# Patient Record
Sex: FEMALE | Race: WHITE | NOT HISPANIC OR LATINO | Employment: FULL TIME | URBAN - METROPOLITAN AREA
[De-identification: names, ages, dates, MRNs, and addresses within clinical notes are randomized per-mention and may not be internally consistent; named-entity substitution may affect disease eponyms.]

---

## 2018-04-09 ENCOUNTER — OFFICE VISIT (OUTPATIENT)
Dept: FAMILY MEDICINE CLINIC | Facility: CLINIC | Age: 53
End: 2018-04-09
Payer: COMMERCIAL

## 2018-04-09 VITALS
HEART RATE: 78 BPM | SYSTOLIC BLOOD PRESSURE: 140 MMHG | BODY MASS INDEX: 32.32 KG/M2 | HEIGHT: 65 IN | TEMPERATURE: 97.7 F | WEIGHT: 194 LBS | OXYGEN SATURATION: 99 % | DIASTOLIC BLOOD PRESSURE: 90 MMHG

## 2018-04-09 DIAGNOSIS — Z00.00 ANNUAL PHYSICAL EXAM: Primary | ICD-10-CM

## 2018-04-09 DIAGNOSIS — Z12.39 SCREENING FOR BREAST CANCER: ICD-10-CM

## 2018-04-09 DIAGNOSIS — Z12.11 COLON CANCER SCREENING: ICD-10-CM

## 2018-04-09 DIAGNOSIS — Z12.4 CERVICAL CANCER SCREENING: ICD-10-CM

## 2018-04-09 PROCEDURE — 99386 PREV VISIT NEW AGE 40-64: CPT | Performed by: FAMILY MEDICINE

## 2018-04-09 NOTE — PROGRESS NOTES
Assessment/Plan:    No problem-specific Assessment & Plan notes found for this encounter  Diagnoses and all orders for this visit:    Annual physical exam  -     CBC and differential; Future  -     Comprehensive metabolic panel; Future  -     TSH, 3rd generation with T4 reflex; Future  -     Lipid panel; Future          Subjective:      Patient ID: Pedro Braun is a 47 y o  female  New pt here for a physical        The following portions of the patient's history were reviewed and updated as appropriate: allergies, current medications, past family history, past medical history, past social history, past surgical history and problem list     Review of Systems   Constitutional: Negative for chills, fatigue and fever  HENT: Negative  Eyes: Negative  Respiratory: Negative for shortness of breath and wheezing  Cardiovascular: Negative for chest pain and palpitations  Gastrointestinal: Negative for abdominal pain, blood in stool, constipation, diarrhea, nausea and vomiting  Endocrine: Negative  Genitourinary: Negative for dysuria  Musculoskeletal: Negative for arthralgias and myalgias  Skin: Negative  Allergic/Immunologic: Negative  Neurological: Negative for seizures and syncope  Hematological: Negative for adenopathy  Psychiatric/Behavioral: Negative  Objective:      /90 (BP Location: Left arm, Patient Position: Sitting, Cuff Size: Large)   Pulse 78   Temp 97 7 °F (36 5 °C) (Tympanic)   Ht 5' 4 5" (1 638 m)   Wt 88 kg (194 lb)   SpO2 99%   BMI 32 79 kg/m²          Physical Exam   Constitutional: She is oriented to person, place, and time  She appears well-developed and well-nourished  No distress  HENT:   Head: Normocephalic and atraumatic     Right Ear: External ear normal    Left Ear: External ear normal    Nose: Nose normal    Mouth/Throat: Oropharynx is clear and moist    Eyes: Conjunctivae and EOM are normal  Pupils are equal, round, and reactive to light  No scleral icterus  Neck: Normal range of motion  Neck supple  Cardiovascular: Normal rate, regular rhythm and normal heart sounds  No murmur heard  Pulmonary/Chest: Effort normal and breath sounds normal  No respiratory distress  She has no wheezes  She has no rales  Abdominal: Soft  Bowel sounds are normal  She exhibits no distension and no mass  There is no tenderness  There is no rebound and no guarding  Musculoskeletal: Normal range of motion  She exhibits no edema  Lymphadenopathy:     She has no cervical adenopathy  Neurological: She is alert and oriented to person, place, and time  She has normal reflexes  She exhibits normal muscle tone  Skin: Skin is warm and dry  No rash noted  She is not diaphoretic  No erythema  No pallor  Psychiatric: She has a normal mood and affect  Her behavior is normal  Judgment and thought content normal    Nursing note and vitals reviewed

## 2018-04-11 ENCOUNTER — TRANSCRIBE ORDERS (OUTPATIENT)
Dept: URGENT CARE | Facility: CLINIC | Age: 53
End: 2018-04-11

## 2018-04-11 ENCOUNTER — APPOINTMENT (OUTPATIENT)
Dept: LAB | Facility: CLINIC | Age: 53
End: 2018-04-11
Payer: COMMERCIAL

## 2018-04-11 DIAGNOSIS — Z00.00 ANNUAL PHYSICAL EXAM: ICD-10-CM

## 2018-04-11 LAB
ALBUMIN SERPL BCP-MCNC: 4 G/DL (ref 3.5–5)
ALP SERPL-CCNC: 71 U/L (ref 46–116)
ALT SERPL W P-5'-P-CCNC: 20 U/L (ref 12–78)
ANION GAP SERPL CALCULATED.3IONS-SCNC: 4 MMOL/L (ref 4–13)
AST SERPL W P-5'-P-CCNC: 26 U/L (ref 5–45)
BASOPHILS # BLD AUTO: 0.01 THOUSANDS/ΜL (ref 0–0.1)
BASOPHILS NFR BLD AUTO: 0 % (ref 0–1)
BILIRUB SERPL-MCNC: 0.44 MG/DL (ref 0.2–1)
BUN SERPL-MCNC: 10 MG/DL (ref 5–25)
CALCIUM SERPL-MCNC: 8.5 MG/DL
CHLORIDE SERPL-SCNC: 108 MMOL/L (ref 100–108)
CHOLEST SERPL-MCNC: 186 MG/DL (ref 50–200)
CO2 SERPL-SCNC: 26 MMOL/L (ref 21–32)
CREAT SERPL-MCNC: 0.81 MG/DL (ref 0.6–1.3)
EOSINOPHIL # BLD AUTO: 0.08 THOUSAND/ΜL (ref 0–0.61)
EOSINOPHIL NFR BLD AUTO: 1 % (ref 0–6)
ERYTHROCYTE [DISTWIDTH] IN BLOOD BY AUTOMATED COUNT: 14 % (ref 11.6–15.1)
GFR SERPL CREATININE-BSD FRML MDRD: 84 ML/MIN/1.73SQ M
GLUCOSE P FAST SERPL-MCNC: 88 MG/DL (ref 65–99)
HCT VFR BLD AUTO: 35 % (ref 34.8–46.1)
HDLC SERPL-MCNC: 48 MG/DL (ref 40–60)
HGB BLD-MCNC: 11.1 G/DL (ref 11.5–15.4)
LDLC SERPL CALC-MCNC: 117 MG/DL (ref 0–100)
LYMPHOCYTES # BLD AUTO: 1.84 THOUSANDS/ΜL (ref 0.6–4.47)
LYMPHOCYTES NFR BLD AUTO: 33 % (ref 14–44)
MCH RBC QN AUTO: 26.6 PG (ref 26.8–34.3)
MCHC RBC AUTO-ENTMCNC: 31.7 G/DL (ref 31.4–37.4)
MCV RBC AUTO: 84 FL (ref 82–98)
MONOCYTES # BLD AUTO: 0.32 THOUSAND/ΜL (ref 0.17–1.22)
MONOCYTES NFR BLD AUTO: 6 % (ref 4–12)
NEUTROPHILS # BLD AUTO: 3.39 THOUSANDS/ΜL (ref 1.85–7.62)
NEUTS SEG NFR BLD AUTO: 60 % (ref 43–75)
NONHDLC SERPL-MCNC: 138 MG/DL
NRBC BLD AUTO-RTO: 0 /100 WBCS
PLATELET # BLD AUTO: 365 THOUSANDS/UL (ref 149–390)
PMV BLD AUTO: 9.5 FL (ref 8.9–12.7)
POTASSIUM SERPL-SCNC: 4.1 MMOL/L (ref 3.5–5.3)
PROT SERPL-MCNC: 7.7 G/DL (ref 6.4–8.2)
RBC # BLD AUTO: 4.17 MILLION/UL (ref 3.81–5.12)
SODIUM SERPL-SCNC: 138 MMOL/L (ref 136–145)
TRIGL SERPL-MCNC: 107 MG/DL
TSH SERPL DL<=0.05 MIU/L-ACNC: 2.57 UIU/ML (ref 0.36–3.74)
WBC # BLD AUTO: 5.65 THOUSAND/UL (ref 4.31–10.16)

## 2018-04-11 PROCEDURE — 84443 ASSAY THYROID STIM HORMONE: CPT

## 2018-04-11 PROCEDURE — 85025 COMPLETE CBC W/AUTO DIFF WBC: CPT

## 2018-04-11 PROCEDURE — 80061 LIPID PANEL: CPT

## 2018-04-11 PROCEDURE — 80053 COMPREHEN METABOLIC PANEL: CPT

## 2018-04-11 PROCEDURE — 36415 COLL VENOUS BLD VENIPUNCTURE: CPT

## 2018-04-18 ENCOUNTER — TELEPHONE (OUTPATIENT)
Dept: GASTROENTEROLOGY | Facility: CLINIC | Age: 53
End: 2018-04-18

## 2018-04-18 NOTE — TELEPHONE ENCOUNTER
Spoke to pt she is aware we do not have the 07/2018 sched just yet for dr Noralyn Primrose we will call her when the sched is avil recall entered

## 2018-04-18 NOTE — TELEPHONE ENCOUNTER
Piotr Quintero  1965  [address] [place] [id number]  [phone]  Cell Phone   Screened by: [ Horace Ward ]    Referring Dr : Julee Gibson    Pre- Screening:   Has patient been referred for a routine screening Colonoscopy? yes  Is the patient over 48years of age? yes    If the answer is YES to both questions, proceed to the medical questions  Do you have any of the following symptoms? no        Have you had a coronary or vascular stent within the last year? no    Have you had a heart attack or stroke in the last 6 months? no    Have you had intestinal surgery in the last 3 months? no    Do you have problems with:sleep apnea  no    Do you use:  Oxygen no  CPAP/BiPAP no    Have you been hospitalized in the last Month? no    Have you been diagnosed with a bleeding disorder or anemia? no    Have you had chest pain (angina) or breathing problems  (COPD) in the last 3 months? no     Do you have any difficulty walking up a flight of stairs? no    Have you had Kidney failure or insufficiency? no    Have you had heart valve surgery? no    Are you confined to a wheelchair? no    Do you take Other blood thinners no  no    Do you take insulin for Diabetes no  Name of medication:    : If patient answers NO to medical questions, then schedule procedure  If patient answers YES to medical questions, then schedule office appointment  Previous Colonoscopy no   (if yes) Date and Facility of last colonoscopy? Patient scheduled for procedure:   Scheduled by:     Time:   Provider:   Location:     Insurance:   Referral Required? Were instructions Mailed? Were instructions sent to [facility]:   Was the prep sent to Pharmacy?      Comments: [ Arthur Scheuermann # [phone] PREFER [facility] IN July  ]

## 2018-05-23 ENCOUNTER — OFFICE VISIT (OUTPATIENT)
Dept: FAMILY MEDICINE CLINIC | Facility: CLINIC | Age: 53
End: 2018-05-23
Payer: COMMERCIAL

## 2018-05-23 VITALS
DIASTOLIC BLOOD PRESSURE: 88 MMHG | HEIGHT: 65 IN | TEMPERATURE: 97.9 F | BODY MASS INDEX: 32.49 KG/M2 | WEIGHT: 195 LBS | SYSTOLIC BLOOD PRESSURE: 138 MMHG

## 2018-05-23 DIAGNOSIS — D64.9 ANEMIA, UNSPECIFIED TYPE: ICD-10-CM

## 2018-05-23 DIAGNOSIS — R03.0 ELEVATED BLOOD PRESSURE READING: ICD-10-CM

## 2018-05-23 DIAGNOSIS — E78.00 HYPERCHOLESTEROLEMIA: Primary | ICD-10-CM

## 2018-05-23 PROCEDURE — 99214 OFFICE O/P EST MOD 30 MIN: CPT | Performed by: FAMILY MEDICINE

## 2018-05-23 NOTE — PROGRESS NOTES
Assessment/Plan:    No problem-specific Assessment & Plan notes found for this encounter  Diagnoses and all orders for this visit:    Hypercholesterolemia  Comments:  pt counseled on diet and exercise  Orders:  -     CBC and differential; Future  -     Comprehensive metabolic panel; Future  -     TSH, 3rd generation with T4 reflex; Future  -     Lipid panel; Future    Anemia, unspecified type  Comments:  pt has heavy periods and is set up to see OB-GYN    Elevated blood pressure reading  Comments:  Bp came down on this visit  Orders:  -     CBC and differential; Future  -     Comprehensive metabolic panel; Future  -     TSH, 3rd generation with T4 reflex; Future  -     Lipid panel; Future    Other orders  -     Cancel: Transferrin; Future  -     Cancel: Iron, TIBC and Ferritin Panel; Future  -     Cancel: CBC and differential; Future          Subjective:      Patient ID: Ashley Mitchell is a 47 y o  female  Follow up for elevated BP which appears improved at this visit      Hyperlipidemia   This is a new problem  This is a new diagnosis  The problem is uncontrolled  Recent lipid tests were reviewed and are high  Pertinent negatives include no chest pain or shortness of breath  She is currently on no antihyperlipidemic treatment  Compliance problems include adherence to diet and adherence to exercise  The following portions of the patient's history were reviewed and updated as appropriate: allergies, current medications, past family history, past medical history, past social history, past surgical history and problem list     Review of Systems   Respiratory: Negative for shortness of breath and wheezing  Cardiovascular: Negative for chest pain and palpitations  Gastrointestinal: Negative for abdominal pain, blood in stool, nausea and vomiting           Objective:      /88   Temp 97 9 °F (36 6 °C)   Ht 5' 4 5" (1 638 m)   Wt 88 5 kg (195 lb)   BMI 32 95 kg/m²          Physical Exam Constitutional: She is oriented to person, place, and time  She appears well-developed and well-nourished  No distress  HENT:   Head: Normocephalic and atraumatic  Eyes: Conjunctivae and EOM are normal  Pupils are equal, round, and reactive to light  No scleral icterus  Neck: Normal range of motion  Neck supple  Cardiovascular: Normal rate, regular rhythm and normal heart sounds  No murmur heard  Pulmonary/Chest: Effort normal and breath sounds normal  No respiratory distress  She has no wheezes  She has no rales  Abdominal: Soft  Bowel sounds are normal  She exhibits no distension and no mass  There is no tenderness  There is no rebound and no guarding  Musculoskeletal: She exhibits no edema  Lymphadenopathy:     She has no cervical adenopathy  Neurological: She is alert and oriented to person, place, and time  Skin: Skin is warm and dry  She is not diaphoretic  Psychiatric: She has a normal mood and affect  Her behavior is normal  Judgment and thought content normal    Nursing note and vitals reviewed

## 2018-07-03 ENCOUNTER — OFFICE VISIT (OUTPATIENT)
Dept: OBGYN CLINIC | Facility: CLINIC | Age: 53
End: 2018-07-03
Payer: COMMERCIAL

## 2018-07-03 VITALS
HEIGHT: 64 IN | DIASTOLIC BLOOD PRESSURE: 88 MMHG | WEIGHT: 193.6 LBS | SYSTOLIC BLOOD PRESSURE: 142 MMHG | BODY MASS INDEX: 33.05 KG/M2

## 2018-07-03 DIAGNOSIS — Z12.31 ENCOUNTER FOR SCREENING MAMMOGRAM FOR MALIGNANT NEOPLASM OF BREAST: ICD-10-CM

## 2018-07-03 DIAGNOSIS — Z01.419 ENCOUNTER FOR GYNECOLOGICAL EXAMINATION WITH PAPANICOLAOU SMEAR OF CERVIX: Primary | ICD-10-CM

## 2018-07-03 PROCEDURE — S0610 ANNUAL GYNECOLOGICAL EXAMINA: HCPCS | Performed by: OBSTETRICS & GYNECOLOGY

## 2018-07-03 NOTE — PROGRESS NOTES
ASSESSMENT & PLAN: Frank West is a 47 y o  [id number] with normal gynecologic exam     1   Routine well woman exam done today  2  Pap and HPV:  The patient's last pap and hpv was 12 years ago   It was normal     Pap and cotesting was done today  Current ASCCP Guidelines reviewed  3   Mammogram ordered  4  Colonoscopy being done this summer   5  The following were reviewed in today's visit: breast self exam, mammography screening ordered, menopause, exercise and healthy diet    CC:  Annual Gynecologic Examination    HPI: Frank West is a 47 y o  [id number] who presents for annual gynecologic examination  She has the following concerns:  None     Health Maintenance:    She wears her seatbelt routinely  She does perform regular monthly self breast exams  She feels safe at home  History reviewed  No pertinent past medical history  Past Surgical History:   Procedure Laterality Date    BREAST BIOPSY      VAGINAL DELIVERY      x3       Past OB/Gyn History:  OB History      Para Term  AB Living    3 3 3     3    SAB TAB Ectopic Multiple Live Births            3            Family History   Problem Relation Age of Onset    Adopted: Yes       Social History:  Social History     Social History    Marital status: /Civil Union     Spouse name: N/A    Number of children: N/A    Years of education: N/A     Occupational History    Not on file  Social History Main Topics    Smoking status: Never Smoker    Smokeless tobacco: Never Used    Alcohol use No    Drug use: No    Sexual activity: Not on file     Other Topics Concern    Not on file     Social History Narrative    Insect bite     No Known Allergies  No current outpatient prescriptions on file        Review of Systems  Constitutional :no fever, feels well, no tiredness, no recent weight gain or loss  ENT: no ear ache, no loss of hearing, no nosebleeds or nasal discharge, no sore throat or hoarseness  Cardiovascular: no complaints of slow or fast heart beat, no chest pain, no palpitations, no leg claudication or lower extremity edema  Respiratory: no complaints of shortness of shortness of breath, no KIDD  Breasts:no complaints of breast pain, breast lump, or nipple discharge  Gastrointestinal: no complaints of abdominal pain, constipation, nausea, vomiting, or diarrhea or bloody stools  Genitourinary : no complaints of dysuria, incontinence, pelvic pain, no dysmenorrhea, vaginal discharge or abnormal vaginal bleeding and as noted in HPI  Musculoskeletal: no complaints of arthralgia, no myalgia, no joint swelling or stiffness, no limb pain or swelling  Integumentary: no complaints of skin rash or lesion, itching or dry skin  Neurological: no complaints of headache, no confusion, no numbness or tingling, no dizziness or fainting    Objective      /88   Ht 5' 4" (1 626 m)   Wt 87 8 kg (193 lb 9 6 oz)   LMP 06/15/2018 (Exact Date)   Breastfeeding? No   BMI 33 23 kg/m²     General:   appears stated age, cooperative, alert normal mood and affect   Heart: regular rate and rhythm, S1, S2 normal, no murmur, click, rub or gallop   Lungs: clear to auscultation bilaterally   Breasts: normal appearance, no masses or tenderness, Normal to palpation without dominant masses   Abdomen: soft, non-tender, without masses or organomegaly   Vulva: normal   Vagina: normal vagina, no discharge, exudate, lesion, or erythema   Urethra: normal   Cervix: Normal, no discharge  Nontender  Uterus: normal size, contour, position, consistency, mobility, non-tender   Adnexa: no mass, fullness, tenderness   Skin normal skin turgor and no rashes     Psychiatric orientation to person, place, and time: normal  mood and affect: normal

## 2018-07-06 LAB
CLINICAL INFO: NORMAL
CYTO CVX: NORMAL
DATE PREVIOUS BX: NORMAL
HPV E6+E7 MRNA CVX QL NAA+PROBE: NOT DETECTED
LMP START DATE: NORMAL
SL AMB PREV. PAP:: NORMAL
SPECIMEN SOURCE CVX/VAG CYTO: NORMAL

## 2018-07-17 ENCOUNTER — HOSPITAL ENCOUNTER (OUTPATIENT)
Dept: MAMMOGRAPHY | Facility: HOSPITAL | Age: 53
Discharge: HOME | End: 2018-07-17
Payer: COMMERCIAL

## 2018-07-17 DIAGNOSIS — Z12.31 ENCOUNTER FOR SCREENING MAMMOGRAM FOR MALIGNANT NEOPLASM OF BREAST: ICD-10-CM

## 2018-07-17 PROCEDURE — 77063 BREAST TOMOSYNTHESIS BI: CPT

## 2018-07-17 PROCEDURE — 77067 SCR MAMMO BI INCL CAD: CPT

## 2018-09-04 ENCOUNTER — OFFICE VISIT (OUTPATIENT)
Dept: GASTROENTEROLOGY | Facility: HOSPITAL | Age: 53
End: 2018-09-04
Payer: COMMERCIAL

## 2018-09-04 VITALS
WEIGHT: 196.4 LBS | BODY MASS INDEX: 33.53 KG/M2 | HEART RATE: 67 BPM | DIASTOLIC BLOOD PRESSURE: 93 MMHG | HEIGHT: 64 IN | TEMPERATURE: 97.7 F | SYSTOLIC BLOOD PRESSURE: 186 MMHG

## 2018-09-04 DIAGNOSIS — Z12.11 COLON CANCER SCREENING: Primary | ICD-10-CM

## 2018-09-04 PROCEDURE — 99203 OFFICE O/P NEW LOW 30 MIN: CPT | Performed by: INTERNAL MEDICINE

## 2018-09-04 RX ORDER — SODIUM, POTASSIUM,MAG SULFATES 17.5-3.13G
1 SOLUTION, RECONSTITUTED, ORAL ORAL ONCE
Qty: 2 BOTTLE | Refills: 0 | Status: SHIPPED | OUTPATIENT
Start: 2018-09-04 | End: 2020-09-01

## 2018-09-04 NOTE — LETTER
September 9, 2018     Cordell Pillai, [address] [place] [address] [address] [facility] [address]    Patient: Nikita Dejesus   YOB: 1965   Date of Visit: 9/4/2018       Dear Dr Dave Filter:    Thank you for referring Nikita Dejesus to me for evaluation  Below are my notes for this consultation  If you have questions, please do not hesitate to call me  I look forward to following your patient along with you           Sincerely,        Lupe Marshall MD        CC: No Recipients

## 2018-09-04 NOTE — PROGRESS NOTES
Assessment/Plan:    Colon cancer screening  She is average risk for colon cancer screening  We discussed doing a colonoscopy at this time  The procedure and is possible risks complications were discussed with the patient at length  She is agreeable to proceed  Diagnoses and all orders for this visit:    Colon cancer screening  -     SUPREP BOWEL PREP KIT 17 5-3 13-1 6 GM/180ML SOLN; Take 1 kit by mouth once for 1 dose Please follow instructions as per the office   -     Case request operating room: COLONOSCOPY; Standing  -     Case request operating room: COLONOSCOPY    Other orders  -     Diet NPO; Sips with meds; Standing  -     Void on call to OR; Standing  -     Insert peripheral IV; Standing          Subjective:      Patient ID: Piotr Quintero is a 47 y o  female  HPI    No lower GI issues  No bleeding in the stools but only once or twice with straining and have not felt hemorrhoids  Never had a colonoscopy done and she is adopted so doesn't know her family history  She doesn't have any reflux but may feel some heartburn if she eats pizza too late at night  The following portions of the patient's history were reviewed and updated as appropriate: allergies, current medications, past family history, past medical history, past social history, past surgical history and problem list     Review of Systems   Constitutional: Negative  HENT: Negative  Eyes: Negative  Respiratory: Negative  Cardiovascular: Negative  Gastrointestinal:        See HPI   Endocrine: Negative  Genitourinary: Negative  Musculoskeletal: Negative  Skin: Negative  Allergic/Immunologic: Negative  Neurological: Negative  Hematological: Negative  Psychiatric/Behavioral: Negative  All other systems reviewed and are negative          Objective:      BP (!) 186/93 (BP Location: Left arm, Patient Position: Sitting, Cuff Size: Adult)   Pulse 67   Temp 97 7 °F (36 5 °C) (Tympanic)   Ht 5' 4" (1 626 m)   Wt 89 1 kg (196 lb 6 4 oz)   BMI 33 71 kg/m²          Physical Exam   Constitutional: She is oriented to person, place, and time  Vital signs are normal  She appears well-developed and well-nourished  HENT:   Head: Normocephalic and atraumatic  Eyes: Conjunctivae are normal  Pupils are equal, round, and reactive to light  No scleral icterus  Neck: Normal range of motion  Cardiovascular: Normal rate, regular rhythm and normal heart sounds  Pulmonary/Chest: Effort normal and breath sounds normal  No respiratory distress  Abdominal: Soft  Normal appearance and bowel sounds are normal  She exhibits no distension, no ascites and no mass  There is no hepatosplenomegaly  There is no tenderness  No hernia  Musculoskeletal: Normal range of motion  Lymphadenopathy:     She has no cervical adenopathy  Neurological: She is alert and oriented to person, place, and time  Skin: Skin is warm  Psychiatric: She has a normal mood and affect   Her behavior is normal  Thought content normal

## 2018-09-09 NOTE — ASSESSMENT & PLAN NOTE
She is average risk for colon cancer screening  We discussed doing a colonoscopy at this time  The procedure and is possible risks complications were discussed with the patient at length  She is agreeable to proceed

## 2018-09-14 ENCOUNTER — TELEPHONE (OUTPATIENT)
Dept: GASTROENTEROLOGY | Facility: CLINIC | Age: 53
End: 2018-09-14

## 2018-10-08 ENCOUNTER — ANESTHESIA EVENT (OUTPATIENT)
Dept: PERIOP | Facility: HOSPITAL | Age: 53
End: 2018-10-08
Payer: COMMERCIAL

## 2018-10-09 ENCOUNTER — ANESTHESIA (OUTPATIENT)
Dept: PERIOP | Facility: HOSPITAL | Age: 53
End: 2018-10-09
Payer: COMMERCIAL

## 2018-10-09 ENCOUNTER — HOSPITAL ENCOUNTER (OUTPATIENT)
Facility: HOSPITAL | Age: 53
Setting detail: OUTPATIENT SURGERY
Discharge: HOME | End: 2018-10-09
Attending: INTERNAL MEDICINE | Admitting: INTERNAL MEDICINE
Payer: COMMERCIAL

## 2018-10-09 VITALS
WEIGHT: 196 LBS | DIASTOLIC BLOOD PRESSURE: 67 MMHG | HEIGHT: 64 IN | TEMPERATURE: 97.6 F | HEART RATE: 77 BPM | OXYGEN SATURATION: 99 % | RESPIRATION RATE: 18 BRPM | BODY MASS INDEX: 33.46 KG/M2 | SYSTOLIC BLOOD PRESSURE: 111 MMHG

## 2018-10-09 DIAGNOSIS — Z12.11 COLON CANCER SCREENING: ICD-10-CM

## 2018-10-09 PROCEDURE — 88305 TISSUE EXAM BY PATHOLOGIST: CPT | Performed by: PATHOLOGY

## 2018-10-09 PROCEDURE — 45385 COLONOSCOPY W/LESION REMOVAL: CPT | Performed by: INTERNAL MEDICINE

## 2018-10-09 RX ORDER — SODIUM CHLORIDE, SODIUM LACTATE, POTASSIUM CHLORIDE, CALCIUM CHLORIDE 600; 310; 30; 20 MG/100ML; MG/100ML; MG/100ML; MG/100ML
125 INJECTION, SOLUTION INTRAVENOUS CONTINUOUS
Status: CANCELLED | OUTPATIENT
Start: 2018-10-09

## 2018-10-09 RX ORDER — ONDANSETRON 2 MG/ML
4 INJECTION INTRAMUSCULAR; INTRAVENOUS ONCE AS NEEDED
Status: DISCONTINUED | OUTPATIENT
Start: 2018-10-09 | End: 2018-10-09 | Stop reason: HOSPADM

## 2018-10-09 RX ORDER — PROPOFOL 10 MG/ML
INJECTION, EMULSION INTRAVENOUS CONTINUOUS PRN
Status: DISCONTINUED | OUTPATIENT
Start: 2018-10-09 | End: 2018-10-09 | Stop reason: SURG

## 2018-10-09 RX ORDER — PROPOFOL 10 MG/ML
INJECTION, EMULSION INTRAVENOUS AS NEEDED
Status: DISCONTINUED | OUTPATIENT
Start: 2018-10-09 | End: 2018-10-09 | Stop reason: SURG

## 2018-10-09 RX ORDER — SODIUM CHLORIDE, SODIUM LACTATE, POTASSIUM CHLORIDE, CALCIUM CHLORIDE 600; 310; 30; 20 MG/100ML; MG/100ML; MG/100ML; MG/100ML
125 INJECTION, SOLUTION INTRAVENOUS CONTINUOUS
Status: DISCONTINUED | OUTPATIENT
Start: 2018-10-09 | End: 2018-10-09 | Stop reason: HOSPADM

## 2018-10-09 RX ADMIN — PROPOFOL 150 MCG/KG/MIN: 10 INJECTION, EMULSION INTRAVENOUS at 11:04

## 2018-10-09 RX ADMIN — SODIUM CHLORIDE, SODIUM LACTATE, POTASSIUM CHLORIDE, AND CALCIUM CHLORIDE 125 ML/HR: .6; .31; .03; .02 INJECTION, SOLUTION INTRAVENOUS at 09:27

## 2018-10-09 RX ADMIN — PROPOFOL 100 MG: 10 INJECTION, EMULSION INTRAVENOUS at 11:04

## 2018-10-09 NOTE — DISCHARGE INSTR - AVS FIRST PAGE
OPERATIVE REPORT  PATIENT NAME: Sayda Paredes    :  1965  MRN: [id number]  Pt Location: [facility] OR ROOM 03    SURGERY DATE: 10/9/2018    Surgeon(s) and Role:     Pj Blas MD - Primary    Preop Diagnosis:  Colon cancer screening [Z12 11]    Post-Op Diagnosis Codes:     * Colon cancer screening [Z12 11]    Procedure(s) (LRB):  COLONOSCOPY (N/A)    Specimen(s):  ID Type Source Tests Collected by Time Destination   1 : polyp retrieved by cold snare Tissue Large Intestine, Right/Ascending Colon TISSUE EXAM Aria Baxter MD 10/9/2018 11:11 AM    2 : polyp retrieved by cold snare Tissue Large Intestine, Sigmoid Colon TISSUE EXAM Aria Baxter MD 10/9/2018 11:19 AM    COLONOSCOPY    PROCEDURE: Colonoscopy/ Polypectomy (Cold Snare)    INDICATIONS: Screening for Colon Cancer    POST-OP DIAGNOSIS: See the impression below    SEDATION: Monitored anesthesia care, check anesthesia records    PHYSICAL EXAM:    /78   Pulse 72   Temp 98 2 °F (36 8 °C) (Tympanic)   Resp 18   Ht 5' 4" (1 626 m)   Wt 88 9 kg (196 lb)   LMP 2018   SpO2 99%   BMI 33 64 kg/m²    Body mass index is 33 64 kg/m²  General: NAD  Heart: S1 & S2 normal, RRR  Lungs: CTA, No rales or rhonchi  Abdomen: Soft, nontender, nondistended, good bowel sounds    CONSENT:  Informed consent was obtained for the procedure, including sedation after explaining the risks and benefits of the procedure  Risks including but not limited to bleeding, perforation, infection, aspiration were discussed in detail  Also explained about less than 100%$ sensitivity with the exam and other alternatives  PREPARATION:   EKG tracing, pulse oximetry, blood pressure were monitored throughout the procedure  Patient was identified by myself both verbally and by visual inspection of ID band  DESCRIPTION:   Patient was placed in the left lateral decubitus position and was sedated with the above medication  Digital rectal examination was performed   The colonoscope was introduced in to the anal canal and advanced up to cecum, which was identified by the appendiceal orifice and IC valve  A careful inspection was made as the colonoscope was withdrawn, including a retroflexed view of the rectum; findings and interventions are described below  Appropriate photodocumentation was obtained  The quality of the colonic preparation was good  FINDINGS:    There was a medium size flat polyp seen in the ascending colon  This was removed by cold snare polypectomy  A small polyp was seen in sigmoid colon which was removed by cold snare polypectomy  Small internal hemorrhoids  Otherwise the colon appeared to be normal          IMPRESSIONS:      1  Medium-sized flat polyp in the ascending colon removed by cold snare polypectomy  2  Small polyp in the sigmoid colon removed by cold snare polypectomy  3  Small internal hemorrhoids  RECOMMENDATIONS:    1  Follow up with the results of the biopsies with Dr Lenny Bullard in 2 weeks  2  Depending on the results of the biopsies may recommend repeating a colonoscopy in 3-5 years  COMPLICATIONS:  None; patient tolerated the procedure well      DISPOSITION: PACU           CONDITION: Stable

## 2018-10-09 NOTE — ANESTHESIA PREPROCEDURE EVALUATION
Review of Systems/Medical History  Patient summary reviewed        Cardiovascular  Negative cardio ROS    Pulmonary  Negative pulmonary ROS        GI/Hepatic  Negative GI/hepatic ROS          Negative  ROS        Endo/Other    Obesity    GYN  Negative gynecology ROS          Hematology  Negative hematology ROS      Musculoskeletal  Negative musculoskeletal ROS        Neurology  Negative neurology ROS      Psychology   Negative psychology ROS              Physical Exam    Airway    Mallampati score: II  TM Distance: >3 FB  Neck ROM: full     Dental       Cardiovascular  Comment: Negative ROS, Cardiovascular exam normal    Pulmonary  Pulmonary exam normal     Other Findings        Anesthesia Plan  ASA Score- 2     Anesthesia Type- IV sedation with anesthesia with ASA Monitors  Additional Monitors:   Airway Plan:         Plan Factors-    Induction- intravenous  Postoperative Plan-     Informed Consent- Anesthetic plan and risks discussed with patient  I personally reviewed this patient with the CRNA  Discussed and agreed on the Anesthesia Plan with the CRNA  Wei Molina

## 2018-10-09 NOTE — OP NOTE
OPERATIVE REPORT  PATIENT NAME: Franchesca Oquendo    :  1965  MRN: [id number]  Pt Location: [facility] OR ROOM 03    SURGERY DATE: 10/9/2018    Surgeon(s) and Role:     Xin Ruiz MD - Primary    Preop Diagnosis:  Colon cancer screening [Z12 11]    Post-Op Diagnosis Codes:     * Colon cancer screening [Z12 11]    Procedure(s) (LRB):  COLONOSCOPY (N/A)    Specimen(s):  ID Type Source Tests Collected by Time Destination   1 : polyp retrieved by cold snare Tissue Large Intestine, Right/Ascending Colon TISSUE EXAM Waylon Jean MD 10/9/2018 11:11 AM    2 : polyp retrieved by cold snare Tissue Large Intestine, Sigmoid Colon TISSUE EXAM Waylon Jean MD 10/9/2018 11:19 AM    COLONOSCOPY    PROCEDURE: Colonoscopy/ Polypectomy (Cold Snare)    INDICATIONS: Screening for Colon Cancer    POST-OP DIAGNOSIS: See the impression below    SEDATION: Monitored anesthesia care, check anesthesia records    PHYSICAL EXAM:    /78   Pulse 72   Temp 98 2 °F (36 8 °C) (Tympanic)   Resp 18   Ht 5' 4" (1 626 m)   Wt 88 9 kg (196 lb)   LMP 2018   SpO2 99%   BMI 33 64 kg/m²   Body mass index is 33 64 kg/m²  General: NAD  Heart: S1 & S2 normal, RRR  Lungs: CTA, No rales or rhonchi  Abdomen: Soft, nontender, nondistended, good bowel sounds    CONSENT:  Informed consent was obtained for the procedure, including sedation after explaining the risks and benefits of the procedure  Risks including but not limited to bleeding, perforation, infection, aspiration were discussed in detail  Also explained about less than 100%$ sensitivity with the exam and other alternatives  PREPARATION:   EKG tracing, pulse oximetry, blood pressure were monitored throughout the procedure  Patient was identified by myself both verbally and by visual inspection of ID band  DESCRIPTION:   Patient was placed in the left lateral decubitus position and was sedated with the above medication  Digital rectal examination was performed   The colonoscope was introduced in to the anal canal and advanced up to cecum, which was identified by the appendiceal orifice and IC valve  A careful inspection was made as the colonoscope was withdrawn, including a retroflexed view of the rectum; findings and interventions are described below  Appropriate photodocumentation was obtained  The quality of the colonic preparation was good  FINDINGS:    There was a medium size flat polyp seen in the ascending colon  This was removed by cold snare polypectomy  A small polyp was seen in sigmoid colon which was removed by cold snare polypectomy  Small internal hemorrhoids  Otherwise the colon appeared to be normal          IMPRESSIONS:      1  Medium-sized flat polyp in the ascending colon removed by cold snare polypectomy  2  Small polyp in the sigmoid colon removed by cold snare polypectomy  3  Small internal hemorrhoids  RECOMMENDATIONS:    1  Follow up with the results of the biopsies with Dr Natalie Michaels in 2 weeks  2  Depending on the results of the biopsies may recommend repeating a colonoscopy in 3-5 years  COMPLICATIONS:  None; patient tolerated the procedure well      DISPOSITION: PACU           CONDITION: Stable

## 2018-10-09 NOTE — H&P
History and Physical - [facility]  Sayda Paredes 47 y o  female MRN: [id number]    HPI: Sayda Paredes is a 47y o  year old female who presents with average risk colon cancer screening       Review of Systems    Historical Information   History reviewed  No pertinent past medical history  Past Surgical History:   Procedure Laterality Date    BREAST BIOPSY      VAGINAL DELIVERY      x3     Social History   History   Alcohol Use No     History   Drug Use No     History   Smoking Status    Never Smoker   Smokeless Tobacco    Never Used     Family History   Problem Relation Age of Onset    Adopted: Yes       Meds/Allergies     Prescriptions Prior to Admission   Medication    SUPREP BOWEL PREP KIT 17 5-3 13-1 6 GM/180ML SOLN       No Known Allergies    Objective     not currently breastfeeding  PHYSICAL EXAM    Gen: NAD  CV: RRR  CHEST: Clear  ABD: soft, NT/ND  EXT: no edema  Neuro: AAO      ASSESSMENT/PLAN:  This is a 47y o  year old female here for average risk colon cancer screening       PLAN:   Procedure: colonoscopy

## 2020-08-17 ENCOUNTER — ANNUAL EXAM (OUTPATIENT)
Dept: OBGYN CLINIC | Facility: MEDICAL CENTER | Age: 55
End: 2020-08-17
Payer: COMMERCIAL

## 2020-08-17 VITALS
BODY MASS INDEX: 34.49 KG/M2 | WEIGHT: 202 LBS | TEMPERATURE: 99 F | SYSTOLIC BLOOD PRESSURE: 120 MMHG | DIASTOLIC BLOOD PRESSURE: 90 MMHG | HEIGHT: 64 IN

## 2020-08-17 DIAGNOSIS — Z12.31 ENCOUNTER FOR SCREENING MAMMOGRAM FOR MALIGNANT NEOPLASM OF BREAST: ICD-10-CM

## 2020-08-17 DIAGNOSIS — N93.9 ABNORMAL UTERINE BLEEDING (AUB): ICD-10-CM

## 2020-08-17 DIAGNOSIS — Z01.419 ENCOUNTER FOR ANNUAL ROUTINE GYNECOLOGICAL EXAMINATION: Primary | ICD-10-CM

## 2020-08-17 PROCEDURE — S0612 ANNUAL GYNECOLOGICAL EXAMINA: HCPCS | Performed by: OBSTETRICS & GYNECOLOGY

## 2020-08-17 RX ORDER — FERROUS SULFATE 325(65) MG
325 TABLET ORAL
COMMUNITY
End: 2022-03-24 | Stop reason: ALTCHOICE

## 2020-08-18 NOTE — PROGRESS NOTES
ASSESSMENT & PLAN: Natalya Mercado is a 49 y o  [id number] with normal gynecologic exam     1   Routine well woman exam done today  2  Pap and HPV:  The patient's last pap and hpv was   It was normal     Pap with cotesting was not done today  Current ASCCP Guidelines reviewed  3   Mammogram ordered  4  Colorectal cancer screening was not ordered  5  The following were reviewed in today's visit: breast self exam, mammography screening ordered, menopause, exercise and healthy diet  6  AUB - US ordered , to follow up after     CC:  Annual Gynecologic Examination    HPI: Natalya Mercado is a 49 y o  [id number] who presents for annual gynecologic examination  She has the following concerns:  Abnormal bleeding  No rhyme or reason to her cycles     Health Maintenance:    She wears her seatbelt routinely  She does perform regular monthly self breast exams  She feels safe at home  History reviewed  No pertinent past medical history      Past Surgical History:   Procedure Laterality Date    BREAST BIOPSY      UT COLONOSCOPY FLX DX W/COLLJ SPEC WHEN PFRMD N/A 10/9/2018    Procedure: COLONOSCOPY;  Surgeon: Luh Amezquita MD;  [facility]: [facility];  Service: Gastroenterology    VAGINAL DELIVERY      x3       Past OB/Gyn History:  OB History        3    Para   3    Term   3            AB        Living   3       SAB        TAB        Ectopic        Multiple        Live Births   3                 Family History   Adopted: Yes       Social History:  Social History     Socioeconomic History    Marital status: /Civil Union     Spouse name: Not on file    Number of children: Not on file    Years of education: Not on file    Highest education level: Not on file   Occupational History    Not on file   Social Needs    Financial resource strain: Not on file    Food insecurity     Worry: Not on file     Inability: Not on file    Transportation needs     Medical: Not on file     Non-medical: Not on file   Tobacco Use    Smoking status: Never Smoker    Smokeless tobacco: Never Used   Substance and Sexual Activity    Alcohol use: No    Drug use: No    Sexual activity: Yes     Partners: Male   Lifestyle    Physical activity     Days per week: Not on file     Minutes per session: Not on file    Stress: Not on file   Relationships    Social connections     Talks on phone: Not on file     Gets together: Not on file     Attends Sabianism service: Not on file     Active member of club or organization: Not on file     Attends meetings of clubs or organizations: Not on file     Relationship status: Not on file    Intimate partner violence     Fear of current or ex partner: Not on file     Emotionally abused: Not on file     Physically abused: Not on file     Forced sexual activity: Not on file   Other Topics Concern    Not on file   Social History Narrative    Insect bite       No Known Allergies    Current Outpatient Medications:     ferrous sulfate 325 (65 Fe) mg tablet, Take 325 mg by mouth daily with breakfast, Disp: , Rfl:     Misc Natural Products (ESTROVEN ENERGY PO), Take 1 tablet by mouth daily, Disp: , Rfl:     SUPREP BOWEL PREP KIT 17 5-3 13-1 6 GM/180ML SOLN, Take 1 kit by mouth once for 1 dose Please follow instructions as per the office  , Disp: 2 Bottle, Rfl: 0      Review of Systems  Constitutional :no fever, feels well, no tiredness, no recent weight gain or loss  ENT: no ear ache, no loss of hearing, no nosebleeds or nasal discharge, no sore throat or hoarseness  Cardiovascular: no complaints of slow or fast heart beat, no chest pain, no palpitations, no leg claudication or lower extremity edema    Respiratory: no complaints of shortness of shortness of breath, no KIDD  Breasts:no complaints of breast pain, breast lump, or nipple discharge  Gastrointestinal: no complaints of abdominal pain, constipation, nausea, vomiting, or diarrhea or bloody stools  Genitourinary :  as noted in HPI   Musculoskeletal: no complaints of arthralgia, no myalgia, no joint swelling or stiffness, no limb pain or swelling  Integumentary: no complaints of skin rash or lesion, itching or dry skin  Neurological: no complaints of headache, no confusion, no numbness or tingling, no dizziness or fainting    Objective      /90   Temp 99 °F (37 2 °C)   Ht 5' 4" (1 626 m)   Wt 91 6 kg (202 lb)   LMP 08/04/2020 (Exact Date)   BMI 34 67 kg/m²     General:   appears stated age, cooperative, alert normal mood and affect   Lungs: Unlabored breathing    Breasts: normal appearance, no masses or tenderness   Abdomen: soft, non-tender, without masses or organomegaly   Vulva: normal   Vagina: normal vagina, no discharge, exudate, lesion, or erythema   Urethra: normal   Cervix: Normal, no discharge  Nontender     Uterus: normal size, contour, position, consistency, mobility, non-tender   Adnexa: no mass, fullness, tenderness   Psychiatric orientation to person, place, and time: normal  mood and affect: normal

## 2020-08-24 ENCOUNTER — HOSPITAL ENCOUNTER (OUTPATIENT)
Dept: MAMMOGRAPHY | Facility: HOSPITAL | Age: 55
Discharge: HOME | End: 2020-08-24
Payer: COMMERCIAL

## 2020-08-24 ENCOUNTER — HOSPITAL ENCOUNTER (OUTPATIENT)
Dept: ULTRASOUND IMAGING | Facility: HOSPITAL | Age: 55
Discharge: HOME | End: 2020-08-24
Payer: COMMERCIAL

## 2020-08-24 VITALS — WEIGHT: 202 LBS | BODY MASS INDEX: 34.49 KG/M2 | HEIGHT: 64 IN

## 2020-08-24 DIAGNOSIS — Z12.31 ENCOUNTER FOR SCREENING MAMMOGRAM FOR MALIGNANT NEOPLASM OF BREAST: ICD-10-CM

## 2020-08-24 DIAGNOSIS — N93.9 ABNORMAL UTERINE BLEEDING (AUB): ICD-10-CM

## 2020-08-24 PROCEDURE — 77067 SCR MAMMO BI INCL CAD: CPT

## 2020-08-24 PROCEDURE — 76856 US EXAM PELVIC COMPLETE: CPT

## 2020-08-24 PROCEDURE — 76830 TRANSVAGINAL US NON-OB: CPT

## 2020-08-24 PROCEDURE — 77063 BREAST TOMOSYNTHESIS BI: CPT

## 2020-08-27 ENCOUNTER — TELEPHONE (OUTPATIENT)
Dept: OTHER | Facility: OTHER | Age: 55
End: 2020-08-27

## 2020-08-27 NOTE — TELEPHONE ENCOUNTER
Argentina Ghotra is calling to inform Dr Christopher Learn the US findings are in [address]

## 2020-09-01 ENCOUNTER — OFFICE VISIT (OUTPATIENT)
Dept: OBGYN CLINIC | Facility: CLINIC | Age: 55
End: 2020-09-01
Payer: COMMERCIAL

## 2020-09-01 VITALS
BODY MASS INDEX: 34.89 KG/M2 | WEIGHT: 204.4 LBS | SYSTOLIC BLOOD PRESSURE: 122 MMHG | TEMPERATURE: 97.6 F | HEIGHT: 64 IN | DIASTOLIC BLOOD PRESSURE: 78 MMHG

## 2020-09-01 DIAGNOSIS — D25.9 UTERINE LEIOMYOMA, UNSPECIFIED LOCATION: ICD-10-CM

## 2020-09-01 DIAGNOSIS — N93.9 ABNORMAL UTERINE BLEEDING (AUB): Primary | ICD-10-CM

## 2020-09-01 PROCEDURE — 99213 OFFICE O/P EST LOW 20 MIN: CPT | Performed by: OBSTETRICS & GYNECOLOGY

## 2020-09-01 PROCEDURE — 88305 TISSUE EXAM BY PATHOLOGIST: CPT | Performed by: PATHOLOGY

## 2020-09-01 PROCEDURE — 58100 BIOPSY OF UTERUS LINING: CPT | Performed by: OBSTETRICS & GYNECOLOGY

## 2020-09-01 NOTE — PROGRESS NOTES
Endometrial biopsy    Date/Time: 9/1/2020 5:37 PM  Performed by: Fiordaliza Puga MD  Authorized by: Fiordaliza Puga MD     Consent:     Consent obtained:  Verbal and written    Consent given by:  Patient    Procedural risks discussed:  Infection, repeat procedure and bleeding    Patient questions answered: yes      Patient agrees, verbalizes understanding, and wants to proceed: yes      Educational handouts given: yes      Instructions and paperwork completed: yes    Indication:     Indications:  Other disorder of menstruation and other abnormal bleeding from female genital tract    Pre-procedure:     Pre-procedure timeout performed: yes    Procedure:     Procedure: endometrial biopsy with Pipelle      A bivalve speculum was placed in the vagina: yes      Cervix cleaned and prepped: yes      The cervix was dilated: no      Uterus sounded: yes      Uterus sound depth (cm):  10 (meassured larger but this was as far as Pippelle could go )    Curettes used:  1    Specimen collected: specimen collected and sent to pathology      Patient tolerated procedure well with no complications: yes    Findings:     Uterus size:  13-14 weeks    Cervix: normal      Adnexa: normal    Comments:      Enlarged uterus

## 2020-09-15 ENCOUNTER — OFFICE VISIT (OUTPATIENT)
Dept: OBGYN CLINIC | Facility: CLINIC | Age: 55
End: 2020-09-15
Payer: COMMERCIAL

## 2020-09-15 VITALS
HEIGHT: 64 IN | SYSTOLIC BLOOD PRESSURE: 122 MMHG | BODY MASS INDEX: 34.31 KG/M2 | WEIGHT: 201 LBS | DIASTOLIC BLOOD PRESSURE: 82 MMHG | TEMPERATURE: 98.6 F

## 2020-09-15 DIAGNOSIS — D25.9 UTERINE LEIOMYOMA, UNSPECIFIED LOCATION: Primary | ICD-10-CM

## 2020-09-15 DIAGNOSIS — N93.9 ABNORMAL UTERINE BLEEDING: ICD-10-CM

## 2020-09-15 PROCEDURE — 99214 OFFICE O/P EST MOD 30 MIN: CPT | Performed by: OBSTETRICS & GYNECOLOGY

## 2020-09-15 NOTE — PROGRESS NOTES
Yandy Davison was seen today for follow-up  Diagnoses and all orders for this visit:    Uterine leiomyoma, unspecified location    Abnormal uterine bleeding         Plan: Today we reviewed US and EMB results   Aware no hyperplasia or malignancy   Today we reviewed available treatment options and aware most definitive treatment would be hysterectomy   Given size and symptoms of her fibroid uterus I believe this will narcisa her the most benefit  Today we did discuss risks of laparoscopic surgery as it pertains to injury to surrounding organs such as bowel , bladder, major blood vessels needing further surgery  We discussed alternatives such as hormonal management   Patient verbalize understanding of all discussed and is interested in proceeding with hysterectomy      Alexander Oquendo is a 49 y o  female here for a follow up visit from EMB sent for abnormal heavy bleeding    Patient Active Problem List   Diagnosis    Colon cancer screening       Gynecologic History  No LMP recorded  No past medical history on file    Past Surgical History:   Procedure Laterality Date    BREAST BIOPSY Right 15yrs benign    SD COLONOSCOPY FLX DX W/COLLJ SPEC WHEN PFRMD N/A 10/9/2018    Procedure: COLONOSCOPY;  Surgeon: Waylon Jean MD;  [facility]: [facility];  Service: Gastroenterology    VAGINAL DELIVERY      x3     Family History   Adopted: Yes   Problem Relation Age of Onset    No Known Problems Daughter     No Known Problems Daughter      Social History     Socioeconomic History    Marital status: /Civil Union     Spouse name: Not on file    Number of children: Not on file    Years of education: Not on file    Highest education level: Not on file   Occupational History    Not on file   Social Needs    Financial resource strain: Not on file    Food insecurity     Worry: Not on file     Inability: Not on file    Transportation needs     Medical: Not on file     Non-medical: Not on file Tobacco Use    Smoking status: Never Smoker    Smokeless tobacco: Never Used   Substance and Sexual Activity    Alcohol use: No    Drug use: No    Sexual activity: Yes     Partners: Male   Lifestyle    Physical activity     Days per week: Not on file     Minutes per session: Not on file    Stress: Not on file   Relationships    Social connections     Talks on phone: Not on file     Gets together: Not on file     Attends Holiness service: Not on file     Active member of club or organization: Not on file     Attends meetings of clubs or organizations: Not on file     Relationship status: Not on file    Intimate partner violence     Fear of current or ex partner: Not on file     Emotionally abused: Not on file     Physically abused: Not on file     Forced sexual activity: Not on file   Other Topics Concern    Not on file   Social History Narrative    Insect bite     No Known Allergies    Current Outpatient Medications:     ferrous sulfate 325 (65 Fe) mg tablet, Take 325 mg by mouth daily with breakfast, Disp: , Rfl:     Review of Systems  Constitutional :no fever, feels well, no tiredness, no recent weight gain or loss  ENT: no ear ache, no loss of hearing, no nosebleeds or nasal discharge, no sore throat or hoarseness  Cardiovascular: no complaints of slow or fast heart beat, no chest pain, no palpitations, no leg claudication or lower extremity edema  Respiratory: no complaints of shortness of shortness of breath, no KIDD  Breasts:no complaints of breast pain, breast lump, or nipple discharge  Gastrointestinal: no complaints of abdominal pain, constipation, nausea, vomiting, or diarrhea or bloody stools  Genitourinary : as noted in HPI  Musculoskeletal: no complaints of arthralgia, no myalgia, no joint swelling or stiffness, no limb pain or swelling    Integumentary: no complaints of skin rash or lesion, itching or dry skin  Neurological: no complaints of headache, no confusion, no numbness or tingling, no dizziness or fainting     Objective     /82   Temp 98 6 °F (37 °C) (Temporal)   Ht 5' 4" (1 626 m)   Wt 91 2 kg (201 lb)   BMI 34 50 kg/m²     General:   appears stated age, cooperative, alert normal mood and affect   Lungs: Unlabored breathing    Psychiatric orientation to person, place, and time: normal  mood and affect: normal     UTERUS:  The uterus is anteverted in position, measuring 13 8 x 7 8 x 9 1 cm  There are multiple fibroids  There is a fundal 3 8 x 3 2 x 3 2 cm intramural fibroid  There is another left posterior fundal intramural fibroid measuring 4 6 x 2 9 x 4 5 cm  There is another anterior fundal intramural fibroid measuring 1 5 x 1 3 x 1 2 cm  There is anterior uterine body intramural fibroid measuring 4 4 x 3 8 x 4 4 cm  The cervix shows no suspicious abnormality      ENDOMETRIUM:    Thickened endometrium measuring 24 mm      OVARIES/ADNEXA:  Right ovary: Right ovary was not visualized      Left ovary: Left ovary also not visualized      There is a 1 8 x 2 0 x 3 0 cm minimally complex left adnexal cyst containing one thin septation, versus 2 adjacent small simple cysts  This is unlikely of any clinical significance      No suspicious adnexal mass or loculated collections  There is no free fluid      IMPRESSION:     Thickened endometrium measuring 24 mm  Consider tissue sampling        Endometrium:     - Secretory phase endometrium         - No plasma cells, hyperplasia or carcinoma identified

## 2020-09-16 ENCOUNTER — TELEPHONE (OUTPATIENT)
Dept: OBGYN CLINIC | Facility: MEDICAL CENTER | Age: 55
End: 2020-09-16

## 2020-09-17 ENCOUNTER — TELEPHONE (OUTPATIENT)
Dept: OBGYN CLINIC | Facility: MEDICAL CENTER | Age: 55
End: 2020-09-17

## 2020-11-11 ENCOUNTER — OFFICE VISIT (OUTPATIENT)
Dept: OBGYN CLINIC | Facility: CLINIC | Age: 55
End: 2020-11-11
Payer: COMMERCIAL

## 2020-11-11 VITALS
DIASTOLIC BLOOD PRESSURE: 72 MMHG | WEIGHT: 204.2 LBS | TEMPERATURE: 98 F | BODY MASS INDEX: 34.86 KG/M2 | HEIGHT: 64 IN | SYSTOLIC BLOOD PRESSURE: 122 MMHG

## 2020-11-11 DIAGNOSIS — Z01.818 PRE-OP TESTING: ICD-10-CM

## 2020-11-11 DIAGNOSIS — R10.2 PELVIC PAIN: ICD-10-CM

## 2020-11-11 DIAGNOSIS — D25.9 UTERINE LEIOMYOMA, UNSPECIFIED LOCATION: Primary | ICD-10-CM

## 2020-11-11 DIAGNOSIS — N93.9 ABNORMAL UTERINE BLEEDING (AUB): ICD-10-CM

## 2020-11-11 PROCEDURE — 99214 OFFICE O/P EST MOD 30 MIN: CPT | Performed by: OBSTETRICS & GYNECOLOGY

## 2020-11-20 RX ORDER — GABAPENTIN 100 MG/1
100 CAPSULE ORAL ONCE
Status: CANCELLED | OUTPATIENT
Start: 2020-12-09 | End: 2020-11-20

## 2020-11-20 RX ORDER — CEFAZOLIN SODIUM 2 G/50ML
2000 SOLUTION INTRAVENOUS ONCE
Status: CANCELLED | OUTPATIENT
Start: 2020-12-09 | End: 2020-11-20

## 2020-11-20 RX ORDER — SODIUM CHLORIDE, SODIUM LACTATE, POTASSIUM CHLORIDE, CALCIUM CHLORIDE 600; 310; 30; 20 MG/100ML; MG/100ML; MG/100ML; MG/100ML
125 INJECTION, SOLUTION INTRAVENOUS CONTINUOUS
Status: CANCELLED | OUTPATIENT
Start: 2020-12-09

## 2020-11-20 RX ORDER — ACETAMINOPHEN 325 MG/1
975 TABLET ORAL ONCE
Status: CANCELLED | OUTPATIENT
Start: 2020-12-09 | End: 2020-11-20

## 2020-12-01 ENCOUNTER — TELEPHONE (OUTPATIENT)
Dept: OBGYN CLINIC | Facility: MEDICAL CENTER | Age: 55
End: 2020-12-01

## 2020-12-02 ENCOUNTER — APPOINTMENT (OUTPATIENT)
Dept: LAB | Facility: HOSPITAL | Age: 55
End: 2020-12-02
Payer: COMMERCIAL

## 2020-12-02 ENCOUNTER — LAB (OUTPATIENT)
Dept: LAB | Facility: HOSPITAL | Age: 55
End: 2020-12-02
Payer: COMMERCIAL

## 2020-12-02 DIAGNOSIS — Z01.818 PRE-OP TESTING: ICD-10-CM

## 2020-12-02 LAB
EST. AVERAGE GLUCOSE BLD GHB EST-MCNC: 120 MG/DL
HBA1C MFR BLD: 5.8 %

## 2020-12-02 PROCEDURE — 83036 HEMOGLOBIN GLYCOSYLATED A1C: CPT

## 2020-12-02 PROCEDURE — 36415 COLL VENOUS BLD VENIPUNCTURE: CPT

## 2020-12-03 ENCOUNTER — TRANSCRIBE ORDERS (OUTPATIENT)
Dept: LAB | Facility: MEDICAL CENTER | Age: 55
End: 2020-12-03

## 2020-12-07 RX ORDER — CETIRIZINE HYDROCHLORIDE 10 MG/1
10 TABLET ORAL AS NEEDED
COMMUNITY

## 2020-12-07 RX ORDER — ACETAMINOPHEN 325 MG/1
650 TABLET ORAL EVERY 6 HOURS PRN
COMMUNITY
End: 2022-03-25 | Stop reason: ALTCHOICE

## 2020-12-07 RX ORDER — IBUPROFEN 200 MG
TABLET ORAL EVERY 6 HOURS PRN
COMMUNITY
End: 2022-05-10 | Stop reason: ALTCHOICE

## 2020-12-08 ENCOUNTER — ANESTHESIA EVENT (OUTPATIENT)
Dept: PERIOP | Facility: HOSPITAL | Age: 55
End: 2020-12-08
Payer: COMMERCIAL

## 2020-12-09 ENCOUNTER — ANESTHESIA (OUTPATIENT)
Dept: PERIOP | Facility: HOSPITAL | Age: 55
End: 2020-12-09
Payer: COMMERCIAL

## 2020-12-09 ENCOUNTER — HOSPITAL ENCOUNTER (OUTPATIENT)
Facility: HOSPITAL | Age: 55
Setting detail: OUTPATIENT SURGERY
Discharge: HOME | End: 2020-12-09
Attending: OBSTETRICS & GYNECOLOGY | Admitting: OBSTETRICS & GYNECOLOGY
Payer: COMMERCIAL

## 2020-12-09 ENCOUNTER — TELEPHONE (OUTPATIENT)
Dept: OBGYN CLINIC | Facility: MEDICAL CENTER | Age: 55
End: 2020-12-09

## 2020-12-09 VITALS
HEIGHT: 64 IN | TEMPERATURE: 98.3 F | SYSTOLIC BLOOD PRESSURE: 125 MMHG | DIASTOLIC BLOOD PRESSURE: 59 MMHG | RESPIRATION RATE: 20 BRPM | WEIGHT: 202 LBS | BODY MASS INDEX: 34.49 KG/M2 | HEART RATE: 86 BPM | OXYGEN SATURATION: 98 %

## 2020-12-09 VITALS — HEART RATE: 95 BPM

## 2020-12-09 DIAGNOSIS — N93.9 ABNORMAL UTERINE BLEEDING (AUB): ICD-10-CM

## 2020-12-09 DIAGNOSIS — R10.2 PELVIC PAIN: ICD-10-CM

## 2020-12-09 DIAGNOSIS — Z90.710 S/P LAPAROSCOPIC HYSTERECTOMY: Primary | ICD-10-CM

## 2020-12-09 DIAGNOSIS — D25.9 UTERINE LEIOMYOMA, UNSPECIFIED LOCATION: ICD-10-CM

## 2020-12-09 PROBLEM — E66.811 CLASS 1 OBESITY DUE TO EXCESS CALORIES IN ADULT: Status: ACTIVE | Noted: 2020-12-09

## 2020-12-09 PROBLEM — E66.09 CLASS 1 OBESITY DUE TO EXCESS CALORIES IN ADULT: Status: ACTIVE | Noted: 2020-12-09

## 2020-12-09 LAB
ABO GROUP BLD: NORMAL
ABO GROUP BLD: NORMAL
ALBUMIN SERPL BCP-MCNC: 4 G/DL (ref 3.5–5)
ALP SERPL-CCNC: 73 U/L (ref 46–116)
ALT SERPL W P-5'-P-CCNC: 56 U/L (ref 12–78)
ANION GAP SERPL CALCULATED.3IONS-SCNC: 12 MMOL/L (ref 4–13)
AST SERPL W P-5'-P-CCNC: 47 U/L (ref 5–45)
ATRIAL RATE: 74 BPM
BASOPHILS # BLD AUTO: 0.02 THOUSANDS/ΜL (ref 0–0.1)
BASOPHILS NFR BLD AUTO: 0 % (ref 0–1)
BILIRUB SERPL-MCNC: 0.32 MG/DL (ref 0.2–1)
BLD GP AB SCN SERPL QL: NEGATIVE
BUN SERPL-MCNC: 12 MG/DL (ref 5–25)
CALCIUM SERPL-MCNC: 9.1 MG/DL (ref 8.3–10.1)
CHLORIDE SERPL-SCNC: 103 MMOL/L (ref 100–108)
CO2 SERPL-SCNC: 24 MMOL/L (ref 21–32)
CREAT SERPL-MCNC: 0.99 MG/DL (ref 0.6–1.3)
EOSINOPHIL # BLD AUTO: 0.06 THOUSAND/ΜL (ref 0–0.61)
EOSINOPHIL NFR BLD AUTO: 1 % (ref 0–6)
ERYTHROCYTE [DISTWIDTH] IN BLOOD BY AUTOMATED COUNT: 11.9 % (ref 11.6–15.1)
EXT PREGNANCY TEST URINE: NEGATIVE
EXT. CONTROL: NORMAL
GFR SERPL CREATININE-BSD FRML MDRD: 65 ML/MIN/1.73SQ M
GLUCOSE P FAST SERPL-MCNC: 99 MG/DL (ref 65–99)
GLUCOSE SERPL-MCNC: 99 MG/DL (ref 65–140)
HCT VFR BLD AUTO: 41.4 % (ref 34.8–46.1)
HGB BLD-MCNC: 13.8 G/DL (ref 11.5–15.4)
IMM GRANULOCYTES # BLD AUTO: 0.02 THOUSAND/UL (ref 0–0.2)
IMM GRANULOCYTES NFR BLD AUTO: 0 % (ref 0–2)
LYMPHOCYTES # BLD AUTO: 1.91 THOUSANDS/ΜL (ref 0.6–4.47)
LYMPHOCYTES NFR BLD AUTO: 34 % (ref 14–44)
MCH RBC QN AUTO: 31.5 PG (ref 26.8–34.3)
MCHC RBC AUTO-ENTMCNC: 33.3 G/DL (ref 31.4–37.4)
MCV RBC AUTO: 95 FL (ref 82–98)
MONOCYTES # BLD AUTO: 0.48 THOUSAND/ΜL (ref 0.17–1.22)
MONOCYTES NFR BLD AUTO: 9 % (ref 4–12)
NEUTROPHILS # BLD AUTO: 3.06 THOUSANDS/ΜL (ref 1.85–7.62)
NEUTS SEG NFR BLD AUTO: 56 % (ref 43–75)
NRBC BLD AUTO-RTO: 0 /100 WBCS
P AXIS: 61 DEGREES
PLATELET # BLD AUTO: 281 THOUSANDS/UL (ref 149–390)
PMV BLD AUTO: 8.8 FL (ref 8.9–12.7)
POTASSIUM SERPL-SCNC: 3.5 MMOL/L (ref 3.5–5.3)
PR INTERVAL: 138 MS
PROT SERPL-MCNC: 8.1 G/DL (ref 6.4–8.2)
QRS AXIS: 6 DEGREES
QRSD INTERVAL: 84 MS
QT INTERVAL: 432 MS
QTC INTERVAL: 479 MS
RBC # BLD AUTO: 4.38 MILLION/UL (ref 3.81–5.12)
RH BLD: POSITIVE
RH BLD: POSITIVE
SODIUM SERPL-SCNC: 139 MMOL/L (ref 136–145)
SPECIMEN EXPIRATION DATE: NORMAL
T WAVE AXIS: 31 DEGREES
VENTRICULAR RATE: 74 BPM
WBC # BLD AUTO: 5.55 THOUSAND/UL (ref 4.31–10.16)

## 2020-12-09 PROCEDURE — 81025 URINE PREGNANCY TEST: CPT | Performed by: ANESTHESIOLOGY

## 2020-12-09 PROCEDURE — 86850 RBC ANTIBODY SCREEN: CPT | Performed by: OBSTETRICS & GYNECOLOGY

## 2020-12-09 PROCEDURE — 93005 ELECTROCARDIOGRAM TRACING: CPT

## 2020-12-09 PROCEDURE — 86900 BLOOD TYPING SEROLOGIC ABO: CPT | Performed by: OBSTETRICS & GYNECOLOGY

## 2020-12-09 PROCEDURE — 86901 BLOOD TYPING SEROLOGIC RH(D): CPT | Performed by: OBSTETRICS & GYNECOLOGY

## 2020-12-09 PROCEDURE — 88307 TISSUE EXAM BY PATHOLOGIST: CPT | Performed by: PATHOLOGY

## 2020-12-09 PROCEDURE — 85025 COMPLETE CBC W/AUTO DIFF WBC: CPT | Performed by: OBSTETRICS & GYNECOLOGY

## 2020-12-09 PROCEDURE — 58573 TLH W/T/O UTERUS OVER 250 G: CPT | Performed by: OBSTETRICS & GYNECOLOGY

## 2020-12-09 PROCEDURE — 93010 ELECTROCARDIOGRAM REPORT: CPT | Performed by: INTERNAL MEDICINE

## 2020-12-09 PROCEDURE — 80053 COMPREHEN METABOLIC PANEL: CPT | Performed by: OBSTETRICS & GYNECOLOGY

## 2020-12-09 RX ORDER — GABAPENTIN 100 MG/1
100 CAPSULE ORAL ONCE
Status: COMPLETED | OUTPATIENT
Start: 2020-12-09 | End: 2020-12-09

## 2020-12-09 RX ORDER — MAGNESIUM HYDROXIDE 1200 MG/15ML
LIQUID ORAL AS NEEDED
Status: DISCONTINUED | OUTPATIENT
Start: 2020-12-09 | End: 2020-12-09 | Stop reason: HOSPADM

## 2020-12-09 RX ORDER — MIDAZOLAM HYDROCHLORIDE 2 MG/2ML
INJECTION, SOLUTION INTRAMUSCULAR; INTRAVENOUS AS NEEDED
Status: DISCONTINUED | OUTPATIENT
Start: 2020-12-09 | End: 2020-12-09

## 2020-12-09 RX ORDER — OXYCODONE HYDROCHLORIDE 5 MG/1
5 TABLET ORAL EVERY 4 HOURS PRN
Status: DISCONTINUED | OUTPATIENT
Start: 2020-12-09 | End: 2020-12-09 | Stop reason: HOSPADM

## 2020-12-09 RX ORDER — VECURONIUM BROMIDE 1 MG/ML
INJECTION, POWDER, LYOPHILIZED, FOR SOLUTION INTRAVENOUS AS NEEDED
Status: DISCONTINUED | OUTPATIENT
Start: 2020-12-09 | End: 2020-12-09

## 2020-12-09 RX ORDER — CEFAZOLIN SODIUM 2 G/50ML
2000 SOLUTION INTRAVENOUS ONCE
Status: COMPLETED | OUTPATIENT
Start: 2020-12-09 | End: 2020-12-09

## 2020-12-09 RX ORDER — ONDANSETRON 2 MG/ML
INJECTION INTRAMUSCULAR; INTRAVENOUS AS NEEDED
Status: DISCONTINUED | OUTPATIENT
Start: 2020-12-09 | End: 2020-12-09

## 2020-12-09 RX ORDER — ACETAMINOPHEN 325 MG/1
650 TABLET ORAL EVERY 6 HOURS SCHEDULED
Status: DISCONTINUED | OUTPATIENT
Start: 2020-12-09 | End: 2020-12-09 | Stop reason: HOSPADM

## 2020-12-09 RX ORDER — PROPOFOL 10 MG/ML
INJECTION, EMULSION INTRAVENOUS AS NEEDED
Status: DISCONTINUED | OUTPATIENT
Start: 2020-12-09 | End: 2020-12-09

## 2020-12-09 RX ORDER — ONDANSETRON 2 MG/ML
4 INJECTION INTRAMUSCULAR; INTRAVENOUS ONCE AS NEEDED
Status: DISCONTINUED | OUTPATIENT
Start: 2020-12-09 | End: 2020-12-09 | Stop reason: HOSPADM

## 2020-12-09 RX ORDER — OXYCODONE HYDROCHLORIDE 5 MG/1
10 TABLET ORAL EVERY 4 HOURS PRN
Status: DISCONTINUED | OUTPATIENT
Start: 2020-12-09 | End: 2020-12-09 | Stop reason: HOSPADM

## 2020-12-09 RX ORDER — SODIUM CHLORIDE, SODIUM LACTATE, POTASSIUM CHLORIDE, CALCIUM CHLORIDE 600; 310; 30; 20 MG/100ML; MG/100ML; MG/100ML; MG/100ML
125 INJECTION, SOLUTION INTRAVENOUS CONTINUOUS
Status: DISCONTINUED | OUTPATIENT
Start: 2020-12-09 | End: 2020-12-09 | Stop reason: HOSPADM

## 2020-12-09 RX ORDER — ACETAMINOPHEN 325 MG/1
975 TABLET ORAL ONCE
Status: COMPLETED | OUTPATIENT
Start: 2020-12-09 | End: 2020-12-09

## 2020-12-09 RX ORDER — BUPIVACAINE HYDROCHLORIDE 2.5 MG/ML
INJECTION, SOLUTION EPIDURAL; INFILTRATION; INTRACAUDAL AS NEEDED
Status: DISCONTINUED | OUTPATIENT
Start: 2020-12-09 | End: 2020-12-09 | Stop reason: HOSPADM

## 2020-12-09 RX ORDER — FENTANYL CITRATE 50 UG/ML
INJECTION, SOLUTION INTRAMUSCULAR; INTRAVENOUS AS NEEDED
Status: DISCONTINUED | OUTPATIENT
Start: 2020-12-09 | End: 2020-12-09

## 2020-12-09 RX ORDER — ONDANSETRON 2 MG/ML
4 INJECTION INTRAMUSCULAR; INTRAVENOUS EVERY 6 HOURS PRN
Status: DISCONTINUED | OUTPATIENT
Start: 2020-12-09 | End: 2020-12-09 | Stop reason: HOSPADM

## 2020-12-09 RX ORDER — DEXAMETHASONE SODIUM PHOSPHATE 4 MG/ML
INJECTION, SOLUTION INTRA-ARTICULAR; INTRALESIONAL; INTRAMUSCULAR; INTRAVENOUS; SOFT TISSUE AS NEEDED
Status: DISCONTINUED | OUTPATIENT
Start: 2020-12-09 | End: 2020-12-09

## 2020-12-09 RX ORDER — GLYCOPYRROLATE 0.2 MG/ML
INJECTION INTRAMUSCULAR; INTRAVENOUS AS NEEDED
Status: DISCONTINUED | OUTPATIENT
Start: 2020-12-09 | End: 2020-12-09

## 2020-12-09 RX ORDER — HYDROMORPHONE HCL/PF 1 MG/ML
0.5 SYRINGE (ML) INJECTION
Status: DISCONTINUED | OUTPATIENT
Start: 2020-12-09 | End: 2020-12-09 | Stop reason: HOSPADM

## 2020-12-09 RX ORDER — FENTANYL CITRATE/PF 50 MCG/ML
50 SYRINGE (ML) INJECTION
Status: DISCONTINUED | OUTPATIENT
Start: 2020-12-09 | End: 2020-12-09 | Stop reason: HOSPADM

## 2020-12-09 RX ORDER — IBUPROFEN 600 MG/1
600 TABLET ORAL EVERY 6 HOURS SCHEDULED
Status: DISCONTINUED | OUTPATIENT
Start: 2020-12-09 | End: 2020-12-09 | Stop reason: HOSPADM

## 2020-12-09 RX ORDER — NEOSTIGMINE METHYLSULFATE 1 MG/ML
INJECTION INTRAVENOUS AS NEEDED
Status: DISCONTINUED | OUTPATIENT
Start: 2020-12-09 | End: 2020-12-09

## 2020-12-09 RX ORDER — HYDROMORPHONE HCL 110MG/55ML
PATIENT CONTROLLED ANALGESIA SYRINGE INTRAVENOUS AS NEEDED
Status: DISCONTINUED | OUTPATIENT
Start: 2020-12-09 | End: 2020-12-09

## 2020-12-09 RX ORDER — OXYCODONE HYDROCHLORIDE 5 MG/1
5 TABLET ORAL EVERY 4 HOURS PRN
Qty: 20 TABLET | Refills: 0 | Status: SHIPPED | OUTPATIENT
Start: 2020-12-09 | End: 2020-12-19

## 2020-12-09 RX ORDER — SODIUM CHLORIDE 9 MG/ML
125 INJECTION, SOLUTION INTRAVENOUS CONTINUOUS
Status: DISCONTINUED | OUTPATIENT
Start: 2020-12-09 | End: 2020-12-09 | Stop reason: HOSPADM

## 2020-12-09 RX ORDER — KETOROLAC TROMETHAMINE 30 MG/ML
INJECTION, SOLUTION INTRAMUSCULAR; INTRAVENOUS AS NEEDED
Status: DISCONTINUED | OUTPATIENT
Start: 2020-12-09 | End: 2020-12-09

## 2020-12-09 RX ADMIN — DEXAMETHASONE SODIUM PHOSPHATE 4 MG: 4 INJECTION INTRA-ARTICULAR; INTRALESIONAL; INTRAMUSCULAR; INTRAVENOUS; SOFT TISSUE at 07:39

## 2020-12-09 RX ADMIN — HYDROMORPHONE HYDROCHLORIDE 0.5 MG: 2 INJECTION INTRAMUSCULAR; INTRAVENOUS; SUBCUTANEOUS at 09:28

## 2020-12-09 RX ADMIN — PROPOFOL 200 MG: 10 INJECTION, EMULSION INTRAVENOUS at 07:39

## 2020-12-09 RX ADMIN — SODIUM CHLORIDE, SODIUM LACTATE, POTASSIUM CHLORIDE, AND CALCIUM CHLORIDE: .6; .31; .03; .02 INJECTION, SOLUTION INTRAVENOUS at 07:44

## 2020-12-09 RX ADMIN — PHENYLEPHRINE HYDROCHLORIDE 100 MCG: 10 INJECTION INTRAVENOUS at 10:50

## 2020-12-09 RX ADMIN — FENTANYL CITRATE 50 MCG: 50 INJECTION, SOLUTION INTRAMUSCULAR; INTRAVENOUS at 09:45

## 2020-12-09 RX ADMIN — SODIUM CHLORIDE, SODIUM LACTATE, POTASSIUM CHLORIDE, AND CALCIUM CHLORIDE: .6; .31; .03; .02 INJECTION, SOLUTION INTRAVENOUS at 10:43

## 2020-12-09 RX ADMIN — VECURONIUM BROMIDE 2 MG: 1 INJECTION, POWDER, LYOPHILIZED, FOR SOLUTION INTRAVENOUS at 08:43

## 2020-12-09 RX ADMIN — GABAPENTIN 100 MG: 100 CAPSULE ORAL at 06:05

## 2020-12-09 RX ADMIN — FENTANYL CITRATE 100 MCG: 50 INJECTION, SOLUTION INTRAMUSCULAR; INTRAVENOUS at 07:39

## 2020-12-09 RX ADMIN — VECURONIUM BROMIDE 1 MG: 1 INJECTION, POWDER, LYOPHILIZED, FOR SOLUTION INTRAVENOUS at 09:21

## 2020-12-09 RX ADMIN — HYDROMORPHONE HYDROCHLORIDE 0.5 MG: 2 INJECTION INTRAMUSCULAR; INTRAVENOUS; SUBCUTANEOUS at 10:54

## 2020-12-09 RX ADMIN — PROPOFOL 50 MG: 10 INJECTION, EMULSION INTRAVENOUS at 10:53

## 2020-12-09 RX ADMIN — SODIUM CHLORIDE 125 ML/HR: 0.9 INJECTION, SOLUTION INTRAVENOUS at 07:28

## 2020-12-09 RX ADMIN — GLYCOPYRROLATE 0.6 MG: 0.2 INJECTION, SOLUTION INTRAMUSCULAR; INTRAVENOUS at 11:11

## 2020-12-09 RX ADMIN — HYDROMORPHONE HYDROCHLORIDE 0.5 MG: 2 INJECTION INTRAMUSCULAR; INTRAVENOUS; SUBCUTANEOUS at 09:09

## 2020-12-09 RX ADMIN — CEFAZOLIN SODIUM 2000 MG: 2 SOLUTION INTRAVENOUS at 07:26

## 2020-12-09 RX ADMIN — VECURONIUM BROMIDE 8 MG: 1 INJECTION, POWDER, LYOPHILIZED, FOR SOLUTION INTRAVENOUS at 07:40

## 2020-12-09 RX ADMIN — HYDROMORPHONE HYDROCHLORIDE 0.5 MG: 2 INJECTION INTRAMUSCULAR; INTRAVENOUS; SUBCUTANEOUS at 08:48

## 2020-12-09 RX ADMIN — ACETAMINOPHEN 975 MG: 325 TABLET ORAL at 06:05

## 2020-12-09 RX ADMIN — PHENYLEPHRINE HYDROCHLORIDE 100 MCG: 10 INJECTION INTRAVENOUS at 11:05

## 2020-12-09 RX ADMIN — PROPOFOL 50 MG: 10 INJECTION, EMULSION INTRAVENOUS at 09:28

## 2020-12-09 RX ADMIN — FENTANYL CITRATE 50 MCG: 50 INJECTION, SOLUTION INTRAMUSCULAR; INTRAVENOUS at 08:22

## 2020-12-09 RX ADMIN — ONDANSETRON 4 MG: 2 INJECTION INTRAMUSCULAR; INTRAVENOUS at 12:56

## 2020-12-09 RX ADMIN — PHENYLEPHRINE HYDROCHLORIDE 200 MCG: 10 INJECTION INTRAVENOUS at 10:41

## 2020-12-09 RX ADMIN — MIDAZOLAM 2 MG: 1 INJECTION INTRAMUSCULAR; INTRAVENOUS at 07:32

## 2020-12-09 RX ADMIN — PHENYLEPHRINE HYDROCHLORIDE 100 MCG: 10 INJECTION INTRAVENOUS at 11:02

## 2020-12-09 RX ADMIN — IBUPROFEN 600 MG: 600 TABLET ORAL at 13:44

## 2020-12-09 RX ADMIN — NEOSTIGMINE METHYLSULFATE 3 MG: 1 INJECTION, SOLUTION INTRAVENOUS at 11:11

## 2020-12-09 RX ADMIN — ONDANSETRON 4 MG: 2 INJECTION INTRAMUSCULAR; INTRAVENOUS at 10:39

## 2020-12-09 RX ADMIN — ACETAMINOPHEN 650 MG: 325 TABLET ORAL at 13:44

## 2020-12-09 RX ADMIN — KETOROLAC TROMETHAMINE 30 MG: 30 INJECTION, SOLUTION INTRAMUSCULAR at 11:04

## 2020-12-22 ENCOUNTER — OFFICE VISIT (OUTPATIENT)
Dept: OBGYN CLINIC | Facility: CLINIC | Age: 55
End: 2020-12-22

## 2020-12-22 VITALS
DIASTOLIC BLOOD PRESSURE: 78 MMHG | BODY MASS INDEX: 34.66 KG/M2 | HEIGHT: 64 IN | WEIGHT: 203 LBS | SYSTOLIC BLOOD PRESSURE: 122 MMHG

## 2020-12-22 DIAGNOSIS — Z90.710 S/P LAPAROSCOPIC HYSTERECTOMY: Primary | ICD-10-CM

## 2020-12-22 PROCEDURE — 99024 POSTOP FOLLOW-UP VISIT: CPT | Performed by: OBSTETRICS & GYNECOLOGY

## 2021-01-19 ENCOUNTER — OFFICE VISIT (OUTPATIENT)
Dept: OBGYN CLINIC | Facility: CLINIC | Age: 56
End: 2021-01-19

## 2021-01-19 VITALS — BODY MASS INDEX: 34.72 KG/M2 | SYSTOLIC BLOOD PRESSURE: 145 MMHG | DIASTOLIC BLOOD PRESSURE: 80 MMHG | WEIGHT: 202.3 LBS

## 2021-01-19 DIAGNOSIS — Z90.710 S/P LAPAROSCOPIC HYSTERECTOMY: Primary | ICD-10-CM

## 2021-01-19 PROCEDURE — 99024 POSTOP FOLLOW-UP VISIT: CPT | Performed by: OBSTETRICS & GYNECOLOGY

## 2021-01-19 NOTE — PROGRESS NOTES
Ollie Paz is a 48 y o  female who presents to the clinic 6 weeks status post TLH for abnormal uterine bleeding and pelvic pain  Eating a regular diet without difficulty  Bowel movements are normal  The patient is not having any pain  The following portions of the patient's history were reviewed and updated as appropriate: allergies, current medications, past family history, past medical history, past social history, past surgical history and problem list     Review of Systems  Pertinent items are noted in HPI  Objective     /80   Wt 91 8 kg (202 lb 4 8 oz)   BMI 34 72 kg/m²   General:  alert and oriented, in no acute distress   Abdomen: soft, bowel sounds active, non-tender   Incision:   healing well, no drainage, no erythema, no hernia, no seroma, no swelling, no dehiscence, incision well approximated     vaginal cuff - well healed , no stitches present     Assessment      Doing well postoperatively  Operative findings again reviewed  Pathology report discussed  Plan     1  Continue any current medications  2  Wound care discussed  3  Activity restrictions: none  4  Anticipated return to work: already at work   5   Follow up: 1 year for yearly visit

## 2022-03-24 ENCOUNTER — OFFICE VISIT (OUTPATIENT)
Dept: FAMILY MEDICINE CLINIC | Facility: CLINIC | Age: 57
End: 2022-03-24
Payer: COMMERCIAL

## 2022-03-24 VITALS
SYSTOLIC BLOOD PRESSURE: 164 MMHG | BODY MASS INDEX: 36.26 KG/M2 | TEMPERATURE: 99 F | DIASTOLIC BLOOD PRESSURE: 92 MMHG | WEIGHT: 212.38 LBS | HEART RATE: 85 BPM | HEIGHT: 64 IN | OXYGEN SATURATION: 98 %

## 2022-03-24 DIAGNOSIS — Z11.4 SCREENING FOR HIV (HUMAN IMMUNODEFICIENCY VIRUS): ICD-10-CM

## 2022-03-24 DIAGNOSIS — R73.03 PREDIABETES: ICD-10-CM

## 2022-03-24 DIAGNOSIS — Z11.59 NEED FOR HEPATITIS C SCREENING TEST: ICD-10-CM

## 2022-03-24 DIAGNOSIS — Z23 ENCOUNTER FOR IMMUNIZATION: ICD-10-CM

## 2022-03-24 DIAGNOSIS — Z12.31 ENCOUNTER FOR SCREENING MAMMOGRAM FOR MALIGNANT NEOPLASM OF BREAST: ICD-10-CM

## 2022-03-24 DIAGNOSIS — Z76.89 ENCOUNTER TO ESTABLISH CARE WITH NEW DOCTOR: Primary | ICD-10-CM

## 2022-03-24 DIAGNOSIS — R53.83 OTHER FATIGUE: ICD-10-CM

## 2022-03-24 DIAGNOSIS — E66.09 CLASS 2 OBESITY DUE TO EXCESS CALORIES WITHOUT SERIOUS COMORBIDITY WITH BODY MASS INDEX (BMI) OF 37.0 TO 37.9 IN ADULT: ICD-10-CM

## 2022-03-24 DIAGNOSIS — R03.0 ELEVATED BP WITHOUT DIAGNOSIS OF HYPERTENSION: ICD-10-CM

## 2022-03-24 PROCEDURE — 99204 OFFICE O/P NEW MOD 45 MIN: CPT | Performed by: FAMILY MEDICINE

## 2022-03-24 PROCEDURE — 3725F SCREEN DEPRESSION PERFORMED: CPT | Performed by: FAMILY MEDICINE

## 2022-03-24 PROCEDURE — 90471 IMMUNIZATION ADMIN: CPT

## 2022-03-24 PROCEDURE — 90715 TDAP VACCINE 7 YRS/> IM: CPT

## 2022-03-24 PROCEDURE — 1036F TOBACCO NON-USER: CPT | Performed by: FAMILY MEDICINE

## 2022-03-24 PROCEDURE — 3008F BODY MASS INDEX DOCD: CPT | Performed by: FAMILY MEDICINE

## 2022-03-24 RX ORDER — ADHESIVE BANDAGE 3/4"
BANDAGE TOPICAL DAILY
Qty: 1 EACH | Refills: 0 | Status: SHIPPED | OUTPATIENT
Start: 2022-03-24

## 2022-03-24 NOTE — PROGRESS NOTES
Assessment/Plan:      Diagnoses and all orders for this visit:    Encounter to establish care with new doctor    Encounter for immunization  -     TDAP VACCINE GREATER THAN OR EQUAL TO 8YO IM    Encounter for screening mammogram for malignant neoplasm of breast  -     Mammo screening bilateral w 3d & cad; Future    Need for hepatitis C screening test  -     Hepatitis C Antibody ([facility]); Future    Screening for HIV (human immunodeficiency virus)  -     HIV 1/2 Antigen/Antibody (4th Generation) w Reflex [facility]; Future    Other fatigue  -     TSH, 3rd generation with Free T4 reflex; Future  -     CBC and differential; Future  -     Iron Panel (Includes Ferritin, Iron Sat%, Iron, and TIBC); Future  -     Comprehensive metabolic panel; Future    Class 2 obesity due to excess calories without serious comorbidity with body mass index (BMI) of 37 0 to 37 9 in adult  -     Lipid panel; Future    Prediabetes  -     HEMOGLOBIN A1C W/ EAG ESTIMATION; Future    Elevated BP without diagnosis of hypertension  Comments:  advised to monitor BP at home and if persistently elevated then call office before follow up visit  Orders:  -     Blood Pressure Monitoring (Blood Pressure Cuff) MISC; Use daily          Return in about 2 weeks (around 4/7/2022) for Hypertension  The following portions of the patient's history were reviewed and updated as appropriate: allergies, current medications, past family history, past medical history, past social history, past surgical history, and problem list      Subjective:     Patient ID: Corey Vazquez is a 55 y o  female  HPI    Corey Vazquez presents today to establish care  Patient doing okay today  History was reviewed as below  Use to see Dr Harrel Koyanagi but last visit 4 years ago  Uterine fibroids and AUB: Total hysterectomy 12/2020  Reports felt great after the hysterecomy   Reports has an appt next month    Allergies: reports claritin or zyrtec for allergies     has GBS and was doing well but since then not doing well  Now has stage 4 cancer    Daughter recently diagnosed with thyroid disease and patient wants testing because she has gained a lot of weight- gained 25 pounds since 12/2020    Glaucoma: on drops following with the eye doctor    Fatigued- use to be iron deficient but stopped taking ferrous sulfate since hysterectomy for abnormal bleeding and fibroids    HTN: reports checked BP a few weeks ago at the blood bank because it was 180  Colonoscopy- reports had one but not sure when          PHQ-9 Depression Screening    Little interest or pleasure in doing things: 0 - not at all  Feeling down, depressed, or hopeless: 0 - not at all          Past Medical History:   Diagnosis Date    Abnormal uterine bleeding     Class 1 obesity due to excess calories in adult 12/9/2020    Contact lens overwear of both eyes     Glaucoma     borderline recently started on lumigan     Motion sickness     Seasonal allergies     Uterine fibroid     Wears glasses        Past Surgical History:   Procedure Laterality Date    BREAST BIOPSY Right 15yrs benign    CYSTOSCOPY N/A 12/9/2020    Procedure: CYSTOSCOPY;  Surgeon: Brooke Velez MD;  [facility];  Service: Gynecology    RI COLONOSCOPY FLX DX W/COLLJ Amna 1978 PFRMD N/A 10/9/2018    Procedure: COLONOSCOPY;  Surgeon: Elicia Sanchez MD;  Location: [facility];  Service: Gastroenterology    RI LAP,RMV  ADNEXAL STRUCTURE Bilateral 12/9/2020    Procedure: SALPINGECTOMY;  Surgeon: Brooke Velez MD;  [facility];  Service: Gynecology    RI LAPAROSCOPY W TOT HYSTERECT UTERUS 250 GRAM OR LESS N/A 12/9/2020    Procedure: LAP TOTAL HYSTERECTOMY;  Surgeon: Brooke Velez MD;  [facility];  Service: Gynecology    TUBAL LIGATION      VAGINAL DELIVERY      x3       Family History   Adopted: Yes   Problem Relation Age of Onset    Hypothyroidism Daughter    [facility] Irritable bowel syndrome Daughter     Irritable bowel syndrome Son        Social History     Tobacco Use    Smoking status: Never Smoker    Smokeless tobacco: Never Used   Vaping Use    Vaping Use: Never used   Substance Use Topics    Alcohol use: Yes     Comment: 2 x a year    Drug use: No      Social History     Social History Narrative    Lives in house- wife  piter and daughter dawn       No Known Allergies    Current Outpatient Medications on File Prior to Visit   Medication Sig Dispense Refill    acetaminophen (TYLENOL) 325 mg tablet Take 650 mg by mouth every 6 (six) hours as needed for mild pain      bimatoprost (LUMIGAN) 0 01 % ophthalmic drops Administer 1 drop to both eyes daily at bedtime      cetirizine (ZyrTEC) 10 mg tablet Take 10 mg by mouth as needed for allergies      ibuprofen (Advil) 200 mg tablet Take by mouth every 6 (six) hours as needed for mild pain       No current facility-administered medications on file prior to visit  Review of Systems      Objective:    Vitals:    03/24/22 1543   BP: 164/92   BP Location: Left arm   Patient Position: Sitting   Cuff Size: Large   Pulse: 85   Temp: 99 °F (37 2 °C)   TempSrc: Tympanic   SpO2: 98%   Weight: 96 3 kg (212 lb 6 oz)   Height: 5' 3 5" (1 613 m)         Physical Exam  Vitals and nursing note reviewed  Constitutional:       General: She is not in acute distress  Appearance: Normal appearance  She is obese  She is not ill-appearing or toxic-appearing  HENT:      Head: Normocephalic and atraumatic  Right Ear: External ear normal       Left Ear: External ear normal    Eyes:      General: No scleral icterus  Right eye: No discharge  Left eye: No discharge  Extraocular Movements: Extraocular movements intact  Conjunctiva/sclera: Conjunctivae normal    Cardiovascular:      Rate and Rhythm: Normal rate and regular rhythm  Heart sounds: Normal heart sounds  No murmur heard    No friction rub  No gallop  Pulmonary:      Effort: Pulmonary effort is normal  No respiratory distress  Breath sounds: Normal breath sounds  No wheezing or rales  Neurological:      Mental Status: She is alert

## 2022-03-24 NOTE — PATIENT INSTRUCTIONS
Check blood pressure daily at home and log those pressures    Go to the lab and get blood work done before     Persistently elevated either or 160/90, then call the office

## 2022-03-25 ENCOUNTER — TELEPHONE (OUTPATIENT)
Dept: ADMINISTRATIVE | Facility: OTHER | Age: 57
End: 2022-03-25

## 2022-03-25 ENCOUNTER — APPOINTMENT (OUTPATIENT)
Dept: LAB | Facility: CLINIC | Age: 57
End: 2022-03-25
Payer: COMMERCIAL

## 2022-03-25 DIAGNOSIS — R53.83 OTHER FATIGUE: ICD-10-CM

## 2022-03-25 DIAGNOSIS — R73.03 PREDIABETES: ICD-10-CM

## 2022-03-25 DIAGNOSIS — Z11.4 SCREENING FOR HIV (HUMAN IMMUNODEFICIENCY VIRUS): ICD-10-CM

## 2022-03-25 DIAGNOSIS — Z11.59 NEED FOR HEPATITIS C SCREENING TEST: ICD-10-CM

## 2022-03-25 DIAGNOSIS — E66.09 CLASS 2 OBESITY DUE TO EXCESS CALORIES WITHOUT SERIOUS COMORBIDITY WITH BODY MASS INDEX (BMI) OF 37.0 TO 37.9 IN ADULT: ICD-10-CM

## 2022-03-25 LAB
ALBUMIN SERPL BCP-MCNC: 3.9 G/DL (ref 3.5–5)
ALP SERPL-CCNC: 69 U/L (ref 46–116)
ALT SERPL W P-5'-P-CCNC: 43 U/L (ref 12–78)
ANION GAP SERPL CALCULATED.3IONS-SCNC: 4 MMOL/L (ref 4–13)
AST SERPL W P-5'-P-CCNC: 38 U/L (ref 5–45)
BASOPHILS # BLD AUTO: 0.02 THOUSANDS/ΜL (ref 0–0.1)
BASOPHILS NFR BLD AUTO: 0 % (ref 0–1)
BILIRUB SERPL-MCNC: 0.56 MG/DL (ref 0.2–1)
BUN SERPL-MCNC: 13 MG/DL (ref 5–25)
CALCIUM SERPL-MCNC: 9 MG/DL (ref 8.3–10.1)
CHLORIDE SERPL-SCNC: 108 MMOL/L (ref 100–108)
CHOLEST SERPL-MCNC: 190 MG/DL
CO2 SERPL-SCNC: 25 MMOL/L (ref 21–32)
CREAT SERPL-MCNC: 0.92 MG/DL (ref 0.6–1.3)
EOSINOPHIL # BLD AUTO: 0.06 THOUSAND/ΜL (ref 0–0.61)
EOSINOPHIL NFR BLD AUTO: 1 % (ref 0–6)
ERYTHROCYTE [DISTWIDTH] IN BLOOD BY AUTOMATED COUNT: 13 % (ref 11.6–15.1)
EST. AVERAGE GLUCOSE BLD GHB EST-MCNC: 123 MG/DL
FERRITIN SERPL-MCNC: 34 NG/ML (ref 8–388)
GFR SERPL CREATININE-BSD FRML MDRD: 70 ML/MIN/1.73SQ M
GLUCOSE P FAST SERPL-MCNC: 100 MG/DL (ref 65–99)
HBA1C MFR BLD: 5.9 %
HCT VFR BLD AUTO: 42.3 % (ref 34.8–46.1)
HCV AB SER QL: NORMAL
HDLC SERPL-MCNC: 49 MG/DL
HGB BLD-MCNC: 13.8 G/DL (ref 11.5–15.4)
IMM GRANULOCYTES # BLD AUTO: 0.02 THOUSAND/UL (ref 0–0.2)
IMM GRANULOCYTES NFR BLD AUTO: 0 % (ref 0–2)
IRON SATN MFR SERPL: 32 % (ref 15–50)
IRON SERPL-MCNC: 102 UG/DL (ref 50–170)
LDLC SERPL CALC-MCNC: 123 MG/DL (ref 0–100)
LYMPHOCYTES # BLD AUTO: 1.55 THOUSANDS/ΜL (ref 0.6–4.47)
LYMPHOCYTES NFR BLD AUTO: 25 % (ref 14–44)
MCH RBC QN AUTO: 29.9 PG (ref 26.8–34.3)
MCHC RBC AUTO-ENTMCNC: 32.6 G/DL (ref 31.4–37.4)
MCV RBC AUTO: 92 FL (ref 82–98)
MONOCYTES # BLD AUTO: 0.45 THOUSAND/ΜL (ref 0.17–1.22)
MONOCYTES NFR BLD AUTO: 7 % (ref 4–12)
NEUTROPHILS # BLD AUTO: 4.2 THOUSANDS/ΜL (ref 1.85–7.62)
NEUTS SEG NFR BLD AUTO: 67 % (ref 43–75)
NONHDLC SERPL-MCNC: 141 MG/DL
NRBC BLD AUTO-RTO: 0 /100 WBCS
PLATELET # BLD AUTO: 259 THOUSANDS/UL (ref 149–390)
PMV BLD AUTO: 9.8 FL (ref 8.9–12.7)
POTASSIUM SERPL-SCNC: 4.1 MMOL/L (ref 3.5–5.3)
PROT SERPL-MCNC: 7.5 G/DL (ref 6.4–8.2)
RBC # BLD AUTO: 4.61 MILLION/UL (ref 3.81–5.12)
SODIUM SERPL-SCNC: 137 MMOL/L (ref 136–145)
TIBC SERPL-MCNC: 322 UG/DL (ref 250–450)
TRIGL SERPL-MCNC: 89 MG/DL
TSH SERPL DL<=0.05 MIU/L-ACNC: 2.49 UIU/ML (ref 0.36–3.74)
WBC # BLD AUTO: 6.3 THOUSAND/UL (ref 4.31–10.16)

## 2022-03-25 PROCEDURE — 82728 ASSAY OF FERRITIN: CPT

## 2022-03-25 PROCEDURE — 80053 COMPREHEN METABOLIC PANEL: CPT

## 2022-03-25 PROCEDURE — 85025 COMPLETE CBC W/AUTO DIFF WBC: CPT

## 2022-03-25 PROCEDURE — 87389 HIV-1 AG W/HIV-1&-2 AB AG IA: CPT

## 2022-03-25 PROCEDURE — 83550 IRON BINDING TEST: CPT

## 2022-03-25 PROCEDURE — 84443 ASSAY THYROID STIM HORMONE: CPT

## 2022-03-25 PROCEDURE — 83036 HEMOGLOBIN GLYCOSYLATED A1C: CPT

## 2022-03-25 PROCEDURE — 80061 LIPID PANEL: CPT

## 2022-03-25 PROCEDURE — 86803 HEPATITIS C AB TEST: CPT

## 2022-03-25 PROCEDURE — 36415 COLL VENOUS BLD VENIPUNCTURE: CPT

## 2022-03-25 PROCEDURE — 83540 ASSAY OF IRON: CPT

## 2022-03-25 NOTE — TELEPHONE ENCOUNTER
Upon review of the In Basket request  we have found this request is up to date in [facility] 2018 found in Surg tab - open- to Operative note report is found there  any further question reachout to [facility] education email  Any additional questions or concerns should be emailed to the Practice Liaisons via [email or web address] email, please do not reply via In Basket      Thank you  Jacinta Mcgee

## 2022-03-25 NOTE — TELEPHONE ENCOUNTER
----- Message from Keenan Higgins sent at 3/25/2022  9:27 AM EDT -----  Regarding: colonoscopy  03/25/22 9:27 AM    Hello, our patient Aashish Melendez has had CRC: Colonoscopy completed/performed  Please assist in updating the patient chart by pulling the Care Everywhere (CE) document  The date of service is 10/11/2018       Thank you,  Keenan READ [facility]

## 2022-03-26 LAB — HIV 1+2 AB+HIV1 P24 AG SERPL QL IA: NORMAL

## 2022-03-28 ENCOUNTER — TELEPHONE (OUTPATIENT)
Dept: FAMILY MEDICINE CLINIC | Facility: CLINIC | Age: 57
End: 2022-03-28

## 2022-03-28 DIAGNOSIS — I10 PRIMARY HYPERTENSION: Primary | ICD-10-CM

## 2022-03-28 NOTE — TELEPHONE ENCOUNTER
Pt called giving her blood pressures from Friday evening to Monday morning that you had asked for  She tried sending a message through [employer] but it was not working for her so she called instead      Friday evening- 164/83  Saturday morning-166/98  Lc morning- 134/91  Monday morning- 154/101

## 2022-03-29 RX ORDER — LOSARTAN POTASSIUM 50 MG/1
50 TABLET ORAL DAILY
Qty: 30 TABLET | Refills: 0 | Status: SHIPPED | OUTPATIENT
Start: 2022-03-29 | End: 2022-04-28 | Stop reason: SDUPTHER

## 2022-03-29 NOTE — TELEPHONE ENCOUNTER
Patients blood pressure was elevated at her first visit and she has been checking since then at home and both systolic and diastolic have been elevated at different times since that visit  Will start single agent, losartan 50 mg once daily

## 2022-04-07 ENCOUNTER — HOSPITAL ENCOUNTER (OUTPATIENT)
Dept: MAMMOGRAPHY | Facility: HOSPITAL | Age: 57
Discharge: HOME | End: 2022-04-07
Attending: FAMILY MEDICINE
Payer: COMMERCIAL

## 2022-04-07 VITALS — WEIGHT: 212 LBS | HEIGHT: 64 IN | BODY MASS INDEX: 36.19 KG/M2

## 2022-04-07 DIAGNOSIS — Z12.31 ENCOUNTER FOR SCREENING MAMMOGRAM FOR MALIGNANT NEOPLASM OF BREAST: ICD-10-CM

## 2022-04-07 PROCEDURE — 77063 BREAST TOMOSYNTHESIS BI: CPT

## 2022-04-07 PROCEDURE — 77067 SCR MAMMO BI INCL CAD: CPT

## 2022-04-07 NOTE — PROGRESS NOTES
Assessment/Plan:      Diagnoses and all orders for this visit:    Primary hypertension  -     POCT ECG      ECG withinout LVH  BP today is well controlled but she reports elevated Bps at home  She no longer has the headaches that she first presented with  Plan to continue at current dosing of losartan 50 mg daily  Log BP twice daily and call back or [employer] message on Friday with results  If improved can consider continued 50 mg dosing of consider losartan hydrochlorothiazide combo pill  Per [employer] pricing would be about the same regardless of choice  Return in about 4 weeks (around 5/6/2022) for Annual physical with pcp  The following portions of the patient's history were reviewed and updated as appropriate: allergies, current medications, past family history, past medical history, past social history, past surgical history, and problem list      Subjective:     Patient ID: Maria De Jesus Dasilva is a 55 y o  female  Here for 2 weeks high BP follow up  Patient called in 3/28 reporting consistently elevated BP at home and hence she was started on losartan 50 mg at that time  Today she reports doing well on medicine  She started it on Saturday  She has been checking BP daily: At 6 AM: 162/104 M, 143/96 T, 145/100 W, 162/94 Th, 125/92 and 127/93 this morning  Reports her takes her medication right when she checks the BP  She reports no longer having the headaches she was having daily previously  She also has not been taking nsaids anymore  She lost 3 pounds since our last visit  She has also Increased water and decreased sugar           PHQ-9 Depression Screening    Little interest or pleasure in doing things: 0 - not at all  Feeling down, depressed, or hopeless: 0 - not at all          Current Outpatient Medications on File Prior to Visit   Medication Sig Dispense Refill    bimatoprost (LUMIGAN) 0 01 % ophthalmic drops Administer 1 drop to both eyes daily at bedtime      Blood Pressure Monitoring (Blood Pressure Cuff) MISC Use daily 1 each 0    cetirizine (ZyrTEC) 10 mg tablet Take 10 mg by mouth as needed for allergies      ibuprofen (Advil) 200 mg tablet Take by mouth every 6 (six) hours as needed for mild pain      losartan (COZAAR) 50 mg tablet Take 1 tablet (50 mg total) by mouth daily 30 tablet 0     No current facility-administered medications on file prior to visit  Review of Systems      Objective:    Vitals:    04/08/22 1533   BP: 138/78   BP Location: Left arm   Patient Position: Sitting   Cuff Size: Large   Pulse: 69   Temp: 98 8 °F (37 1 °C)   TempSrc: Tympanic   SpO2: 98%   Weight: 94 8 kg (209 lb)   Height: 5' 3 5" (1 613 m)         Physical Exam  Vitals and nursing note reviewed  Constitutional:       General: She is not in acute distress  Appearance: Normal appearance  She is not ill-appearing or toxic-appearing  HENT:      Head: Normocephalic and atraumatic  Right Ear: External ear normal       Left Ear: External ear normal    Eyes:      General: No scleral icterus  Right eye: No discharge  Left eye: No discharge  Extraocular Movements: Extraocular movements intact  Conjunctiva/sclera: Conjunctivae normal    Neck:      Vascular: No carotid bruit  Cardiovascular:      Rate and Rhythm: Normal rate and regular rhythm  Heart sounds: Normal heart sounds  No murmur heard  No friction rub  No gallop  Pulmonary:      Effort: Pulmonary effort is normal  No respiratory distress  Breath sounds: Normal breath sounds  No wheezing or rales  Neurological:      Mental Status: She is alert

## 2022-04-08 ENCOUNTER — OFFICE VISIT (OUTPATIENT)
Dept: FAMILY MEDICINE CLINIC | Facility: CLINIC | Age: 57
End: 2022-04-08
Payer: COMMERCIAL

## 2022-04-08 VITALS
TEMPERATURE: 98.8 F | OXYGEN SATURATION: 98 % | BODY MASS INDEX: 35.68 KG/M2 | HEART RATE: 69 BPM | DIASTOLIC BLOOD PRESSURE: 78 MMHG | HEIGHT: 64 IN | SYSTOLIC BLOOD PRESSURE: 138 MMHG | WEIGHT: 209 LBS

## 2022-04-08 DIAGNOSIS — I10 PRIMARY HYPERTENSION: Primary | ICD-10-CM

## 2022-04-08 PROCEDURE — 1036F TOBACCO NON-USER: CPT | Performed by: FAMILY MEDICINE

## 2022-04-08 PROCEDURE — 99213 OFFICE O/P EST LOW 20 MIN: CPT | Performed by: FAMILY MEDICINE

## 2022-04-08 PROCEDURE — 3008F BODY MASS INDEX DOCD: CPT | Performed by: FAMILY MEDICINE

## 2022-04-08 PROCEDURE — 93000 ELECTROCARDIOGRAM COMPLETE: CPT | Performed by: FAMILY MEDICINE

## 2022-04-08 PROCEDURE — 3725F SCREEN DEPRESSION PERFORMED: CPT | Performed by: FAMILY MEDICINE

## 2022-04-08 NOTE — PATIENT INSTRUCTIONS
DASH Eating Plan   WHAT YOU NEED TO KNOW:   What is the DASH Eating Plan? The DASH (Dietary Approaches to Stop Hypertension) Eating Plan is designed to help prevent or lower high blood pressure  It can also help to lower LDL (bad) cholesterol and decrease your risk for heart disease  The plan is low in sodium, sugar, unhealthy fats, and total fat  It is high in potassium, calcium, magnesium, and fiber  These nutrients are added when you eat more fruits, vegetables, and whole grains  With the DASH eating plan, you need to eat a certain number of servings from each food group  This will help you get enough of certain nutrients and limit others  The amount of servings you should eat depends on how many calories you need  Your dietitian can help you create meal plans with the right number of servings for each food group  What do I need to know about sodium? Your dietitian will tell you how much sodium is safe for you to have each day  People with high blood pressure should have no more than 1,500 to 2,300 mg of sodium in a day  A teaspoon (tsp) of salt has 2,300 mg of sodium  This may seem like a difficult goal, but small changes to the foods you eat can make a big difference  Your healthcare provider or dietitian can help you create a meal plan that follows your sodium limit  · Read food labels  Food labels can help you choose foods that are low in sodium  The amount of sodium is listed in milligrams (mg)  The % Daily Value (DV) column tells you how much of your daily needs are met by 1 serving of the food for each nutrient listed  Choose foods that have less than 5% of the DV of sodium  These foods are considered low in sodium  Foods that have 20% or more of the DV of sodium are considered high in sodium  Avoid foods that have more than 300 mg of sodium in each serving  Choose foods that say low-sodium, reduced-sodium, or no salt added on the food label  · Limit added salt    Do not salt food at the table if you add salt when you cook  Use herbs and spices, such as onions, garlic, and salt-free seasonings to add flavor  Try lemon or lime juice or vinegar to add a tart flavor  Use hot peppers or a small amount of hot pepper sauce to add a spicy flavor  Limit foods high in added salt, such as the following:    ? Seasonings made with salt, such as garlic salt, celery salt, onion salt, seasoned salt, meat tenderizers, and monosodium glutamate (MSG)    ? Miso soup and canned or dried soup mixes    ? Regular soy sauce, barbecue sauce, teriyaki sauce, steak sauce, Worcestershire sauce, and most flavored vinegars    ? Snack foods, such as salted chips, popcorn, pretzels, pork rinds, salted crackers, and salted nuts    ? Frozen foods, such as dinners, entrees, vegetables with sauces, and breaded meats    · Ask about salt substitutes  Ask your healthcare provider if you may use salt substitutes  Some salt substitutes have ingredients that can be harmful if you have certain health conditions  · Choose foods carefully at restaurants  Meals from restaurants, especially fast food restaurants, are often high in sodium  Some restaurants have nutrition information that tells you the amount of sodium in their foods  Ask to have your food prepared with less, or no salt  What do I need to know about fats? Healthy fats include unsaturated fats and omega-3 fatty acids  Unhealthy fats include saturated fats and trans fats  · Include healthy fats, such as the following:      ? Cooking oils, such as soybean, canola, olive, or sunflower    ? Fatty fish, such as salmon, tuna, mackerel, or sardines    ? Flaxseed oil or ground flaxseed    ? ½ cup of cooked beans, such as black beans, kidney beans, or brown beans    ? 1½ ounces of low-sodium nuts, such as almonds or walnuts    ? Low-sugar, low-sodium peanut butter    ?  Seeds such as mercedes seeds or sunflower seeds       · Limit or do not have unhealthy fats, such as the following: ? Foods that contain fat from animals, such as fatty meats, whole milk, butter, and cream    ? Shortening, stick margarine, palm oil, and coconut oil    ? Full-fat or creamy salad dressing    ? Creamy soup    ? Crackers, chips, and baked goods made with margarine or shortening    ? Foods that are fried in unhealthy fats    ? Gravy and sauces, such as Cm or cheese sauces    What do I need to know about carbohydrates (carbs)? All carbs break down into sugar  Complex carbs contain more fiber than simple carbs  This means complex carbs go into the bloodstream more slowly and cause less of a blood sugar spike  Try to include more complex carbs and fewer simple carbs  · Include complex carbs, such as the following:      ? 1 slice of whole-grain bread    ? 1 ounce of dry cereal that does not contain added sugar    ? ½ cup of cooked oatmeal    ? 2 ounces of cooked whole-grain pasta    ? ½ cup of cooked brown rice    · Limit or do not have simple carbs, such as the following:      ? Baked goods, such as doughnuts, pastries, and cookies    ? Mixes for cornbread and biscuits    ? White rice and pasta mixes, such as boxed macaroni and cheese    ? Instant and cold cereals that contain sugar    ? Jelly, jam, and ice cream that contain sugar    ? Condiments such as ketchup    ? Drinks high in sugar, such as soft drinks, lemonade, and fruit juice    What do I need to know about vegetables and fruits? Vegetables and fruits can be fresh, frozen, or canned  If possible, try to choose low-sodium canned options  · Include a variety of vegetables and fruits, such as the following:      ? 1 medium apple, pear, or peach (about ½ cup chopped)    ? ½ small banana    ? ½ cup berries, such as blueberries, strawberries, or blackberries    ? 1 cup of raw leafy greens, such as lettuce, spinach, kale, or reyna greens    ? ½ cup of frozen or canned (no added salt) vegetables, such as green beans    ?  ½ cup of fresh, frozen, or canned fruit (canned in light syrup or fruit juice)    ? ½ cup of vegetable or fruit juice    · Limit or do not have vegetables and fruits made in the following ways:      ? Frozen fruit such as cherries that have added sugar    ? Fruit in cream or butter sauce    ? Canned vegetables that are high in sodium    ? Sauerkraut, pickled vegetables, and other foods prepared in brine    ? Fried vegetables or vegetables in butter or high-fat sauces    What do I need to know about protein foods? · Include lean or low-fat protein foods, such as the following:      ? Poultry (chicken, turkey) with no skin    ? Fish (especially fatty fish, such as salmon, fresh tuna, or mackerel)    ? Lean beef and pork (loin, round, extra lean hamburger)    ? Egg whites and egg substitutes    ? 1 cup of nonfat (skim) or 1% milk    ? 1½ ounces of fat-free or low-fat cheese    ? 6 ounces of nonfat or low-fat yogurt    · Limit or do not have high-fat protein foods, such as the following:      ? Smoked or cured meat, such as corned beef, doll, ham, hot dogs, and sausage    ? Canned beans and canned meats or spreads, such as potted meats, sardines, anchovies, and imitation seafood    ? Deli or lunch meats, such as bologna, ham, turkey, and roast beef    ? High-fat meat (T-bone steak, regular hamburger, and ribs)    ? Whole eggs and egg yolks    ? Whole milk, 2% milk, and cream    ? Regular cheese and processed cheese    What other guidelines should I follow? · Maintain a healthy weight  Your risk for heart disease is higher if you are overweight  Your healthcare provider may suggest that you lose weight if you are overweight  You can lose weight by eating fewer calories and foods that have added sugars and fat  The DASH meal plan can help you do this  Decrease calories by eating smaller portions at each meal and fewer snacks  Ask your healthcare provider for more information about how to lose weight  · Exercise regularly    Regular exercise can help you reach or maintain a healthy weight  Regular exercise can also help decrease your blood pressure and improve your cholesterol levels  Get 30 minutes or more of moderate exercise each day of the week  To lose weight, get at least 60 minutes of exercise  Talk to your healthcare provider about the best exercise program for you  · Limit alcohol  Women should limit alcohol to 1 drink a day  Men should limit alcohol to 2 drinks a day  A drink of alcohol is 12 ounces of beer, 5 ounces of wine, or 1½ ounces of liquor  Where can I find more information? · [facility] and [address]  [address] [address]  [place] [address]  Phone: 6- 280 - [phone]  Web Address: [facility] AGREEMENT:   You have the right to help plan your care  Discuss treatment options with your healthcare provider to decide what care you want to receive  You always have the right to refuse treatment  The above information is an  only  It is not intended as medical advice for individual conditions or treatments  Talk to your doctor, nurse or pharmacist before following any medical regimen to see if it is safe and effective for you  © Copyright [address] 2022 Information is for End User's use only and may not be sold, redistributed or otherwise used for commercial purposes   All illustrations and images included in CareNotes® are the copyrighted property of [employer]  or [address] [place] [address]

## 2022-04-11 ENCOUNTER — ANNUAL EXAM (OUTPATIENT)
Dept: OBGYN CLINIC | Facility: CLINIC | Age: 57
End: 2022-04-11
Payer: COMMERCIAL

## 2022-04-11 VITALS — BODY MASS INDEX: 36.44 KG/M2 | WEIGHT: 209 LBS | SYSTOLIC BLOOD PRESSURE: 140 MMHG | DIASTOLIC BLOOD PRESSURE: 85 MMHG

## 2022-04-11 DIAGNOSIS — Z90.710 S/P LAPAROSCOPIC HYSTERECTOMY: ICD-10-CM

## 2022-04-11 DIAGNOSIS — Z01.419 ENCOUNTER FOR GYNECOLOGICAL EXAMINATION: Primary | ICD-10-CM

## 2022-04-11 PROCEDURE — S0612 ANNUAL GYNECOLOGICAL EXAMINA: HCPCS | Performed by: OBSTETRICS & GYNECOLOGY

## 2022-04-11 NOTE — PROGRESS NOTES
ASSESSMENT & PLAN: Maria Antonia Becerra is a 55 y o  [id number] with normal gynecologic exam     1   Routine well woman exam done today  2  Pap and HPV:  The patient's last pap and hpv was 2018  It was normal     Pap with cotesting was not done today  Current ASCCP Guidelines reviewed  3   Mammogram ordered  4  Colorectal cancer screening was not ordered  5  The following were reviewed in today's visit: breast self exam, mammography screening ordered, menopause, exercise and healthy diet      CC:  Annual Gynecologic Examination    HPI: Maria Antonia Becerra is a 55 y o  [id number] who presents for annual gynecologic examination  She has the following concerns:  Weight gain     Health Maintenance:    She wears her seatbelt routinely  She does perform regular monthly self breast exams  She feels safe at home           Past Medical History:   Diagnosis Date    Abnormal uterine bleeding     Class 1 obesity due to excess calories in adult 12/9/2020    Contact lens overwear of both eyes     Glaucoma     borderline recently started on lumigan     Motion sickness     Seasonal allergies     Uterine fibroid     Wears glasses        Past Surgical History:   Procedure Laterality Date    BREAST BIOPSY Right 15yrs benign    CYSTOSCOPY N/A 12/9/2020    Procedure: CYSTOSCOPY;  Surgeon: Rolanda Kehr, MD;  Location: [facility];  Service: Gynecology    HYSTERECTOMY      AR COLONOSCOPY FLX DX W/COLLJ Amna 1978 PFRMD N/A 10/9/2018    Procedure: COLONOSCOPY;  Surgeon: Hola Coe MD;  Location: [facility];  Service: Gastroenterology    AR LAP,RMV  ADNEXAL STRUCTURE Bilateral 12/9/2020    Procedure: SALPINGECTOMY;  Surgeon: Rolanda Kehr, MD;  Location: [facility];  Service: Gynecology    AR LAPAROSCOPY W TOT HYSTERECT UTERUS 250 GRAM OR LESS N/A 12/9/2020    Procedure: LAP TOTAL HYSTERECTOMY;  Surgeon: Rolanda Kehr, MD;  Location: [facility];  Service: Gynecology    TUBAL [id number]      VAGINAL DELIVERY      x3       Past OB/Gyn History:  OB History        3    Para   3    Term   3            AB        Living   3       SAB        IAB        Ectopic        Multiple        Live Births   3                 Family History   Adopted: Yes   Problem Relation Age of Onset    Hypothyroidism Daughter     Irritable bowel syndrome Daughter     Irritable bowel syndrome Son        Social History:  Social History     Socioeconomic History    Marital status: /Civil Union     Spouse name: Not on file    Number of children: 3    Years of education: Not on file    Highest education level: Associate degree: occupational, technical, or vocational program   Occupational History     Comment: manager for work study program   Tobacco Use    Smoking status: Never Smoker    Smokeless tobacco: Never Used   Vaping Use    Vaping Use: Never used   Substance and Sexual Activity    Alcohol use:  Yes     Alcohol/week: 0 0 standard drinks     Comment: 1 or 2 alcoholic  beverages on holidays    Drug use: No    Sexual activity: Not Currently     Partners: Male   Other Topics Concern    Not on file   Social History Narrative    Lives in house- wife  piter and daughter dawn     Social Determinants of Health     Financial Resource Strain: Not on file   Food Insecurity: Not on file   Transportation Needs: Not on file   Physical Activity: Not on file   Stress: Not on file   Social Connections: Not on file   Intimate Partner Violence: Not on file   Housing Stability: Not on file       No Known Allergies    Current Outpatient Medications:     bimatoprost (LUMIGAN) 0 01 % ophthalmic drops, Administer 1 drop to both eyes daily at bedtime, Disp: , Rfl:     Blood Pressure Monitoring (Blood Pressure Cuff) MISC, Use daily, Disp: 1 each, Rfl: 0    cetirizine (ZyrTEC) 10 mg tablet, Take 10 mg by mouth as needed for allergies, Disp: , Rfl:     ibuprofen (Advil) 200 mg tablet, Take by mouth every 6 (six) hours as needed for mild pain, Disp: , Rfl:     losartan (COZAAR) 50 mg tablet, Take 1 tablet (50 mg total) by mouth daily, Disp: 30 tablet, Rfl: 0      Review of Systems  Constitutional :no fever, feels well, no tiredness, no recent weight gain or loss  ENT: no ear ache, no loss of hearing, no nosebleeds or nasal discharge, no sore throat or hoarseness  Cardiovascular: no complaints of slow or fast heart beat, no chest pain, no palpitations, no leg claudication or lower extremity edema  Respiratory: no complaints of shortness of shortness of breath, no KIDD  Breasts:no complaints of breast pain, breast lump, or nipple discharge  Gastrointestinal: no complaints of abdominal pain, constipation, nausea, vomiting, or diarrhea or bloody stools  Genitourinary : no complaints of dysuria, incontinence, pelvic pain, no dysmenorrhea, vaginal discharge or abnormal vaginal bleeding and as noted in HPI  Musculoskeletal: no complaints of arthralgia, no myalgia, no joint swelling or stiffness, no limb pain or swelling  Integumentary: no complaints of skin rash or lesion, itching or dry skin  Neurological: no complaints of headache, no confusion, no numbness or tingling, no dizziness or fainting    Objective      /85   Wt 94 8 kg (209 lb)   LMP 11/02/2020 (Exact Date)   BMI 36 44 kg/m²     General:   appears stated age, cooperative, alert normal mood and affect   Lungs: Unlabored breathing    Breasts: normal appearance, no masses or tenderness   Abdomen: soft, non-tender, without masses or organomegaly   Vulva: normal   Vagina: normal vagina, no discharge, exudate, lesion, or erythema   Urethra: normal   Cervix: Absent     Uterus: uterus absent   Adnexa: no mass, fullness, tenderness   Psychiatric orientation to person, place, and time: normal  mood and affect: normal

## 2022-04-28 DIAGNOSIS — I10 PRIMARY HYPERTENSION: ICD-10-CM

## 2022-04-28 RX ORDER — LOSARTAN POTASSIUM 50 MG/1
50 TABLET ORAL DAILY
Qty: 30 TABLET | Refills: 1 | Status: SHIPPED | OUTPATIENT
Start: 2022-04-28 | End: 2022-05-10 | Stop reason: DRUGHIGH

## 2022-05-06 NOTE — PATIENT INSTRUCTIONS
SLEEP HYGIENE    Your lifestyle affects your health and your sleep  Here are some healthy habits:  · Keep a regular sleep schedule  Go to bed and get up at the same time each day  Yes, even weekends  · Keep the bedroom cool, quiet and dark  · Exercise regularly  It may help you reduce stress  Avoid strenuous exercise for 2 to 4 hours before bedtime  · Avoid or limit naps, especially in the late afternoon  · Use your bed only for sleep and sex  · Turn off screens 30-60 minutes before trying to go to sleep  · Avoid or limit caffeine and nicotine for up to 6 hours before bedtime  They can keep you awake at night  · Also avoid alcohol for at least 4 to 6 hours before bedtime  It may help you fall asleep at first  But you will have more awakenings during the night  And your sleep will not be restful  · Waking up later, taking naps, going to bed too early diminish our sleep drive and make it harder to sleep when we are supposed to  · Don't spend too much time in bed trying to fall asleep  If you can't fall asleep, get up and do something (no electronics) until you become tired and drowsy  Go to another room, keep the lights low and do something pleasant but not too absorbing  Read a book! No screens! Do a crossword  Listen to soothing music  The more time you spend in bed trying to fall asleep means more opportunity for frustration and failure  And before long we teach our brain to associate bed with the negative emotions we feel lying there  · Change the way you think about sleep  Quit telling yourself you "wont be able to sleep or function the next day " Replace those thoughts with positive thoughts "A bad night of sleep is not the end of the world " Practice gratitude and think of the things you are grateful for instead or savor the "favorite moments" of the day  · Sometimes we worry because our brain is telling us not to forget something   Write down your worries during the day in a journal or book, when worry comes at night you can tell yourself you've already documented it  · Try CBT (cognitive behavioral therapy) it is the best in addition to the sleep hygiene recommendations above  There are therapists to help with a program but they also have sina versions like CBT I, sleepio and somryst  · netflix series called headspace- sleep   · fiton- workouts- sleep, Deepest sleep ever      Wellness Visit for Adults   AMBULATORY CARE:   A wellness visit  is when you see your healthcare provider to get screened for health problems  Your healthcare provider will also give you advice on how to stay healthy  Write down your questions so you remember to ask them  Ask your healthcare provider how often you should have a wellness visit  What happens at a wellness visit:  Your healthcare provider will ask about your health, and your family history of health problems  This includes high blood pressure, heart disease, and cancer  He or she will ask if you have symptoms that concern you, if you smoke, and about your mood  You may also be asked about your intake of medicines, supplements, food, and alcohol  Any of the following may be done:  · Your weight  will be checked  Your height may also be checked so your body mass index (BMI) can be calculated  Your BMI shows if you are at a healthy weight  · Your blood pressure  and heart rate will be checked  Your temperature may also be checked  · Blood and urine tests  may be done  Blood tests may be done to check your cholesterol levels  Abnormal cholesterol levels increase your risk for heart disease and stroke  You may also need a blood or urine test to check for diabetes if you are at increased risk  Urine tests may be done to look for signs of an infection or kidney disease  · A physical exam  includes checking your heartbeat and lungs with a stethoscope  Your healthcare provider may also check your skin to look for sun damage  · Screening tests  may be recommended   A screening test is done to check for diseases that may not cause symptoms  The screening tests you may need depend on your age, gender, family history, and lifestyle habits  For example, colorectal screening may be recommended if you are 48years old or older  Screening tests you need if you are a woman:   · A Pap smear  is used to screen for cervical cancer  Pap smears are usually done every 3 to 5 years depending on your age  You may need them more often if you have had abnormal Pap smear test results in the past  Ask your healthcare provider how often you should have a Pap smear  · A mammogram  is an x-ray of your breasts to screen for breast cancer  Experts recommend mammograms every 2 years starting at age 48 years  You may need a mammogram at age 52 years or younger if you have an increased risk for breast cancer  Talk to your healthcare provider about when you should start having mammograms and how often you need them  Vaccines you may need:   · Get an influenza vaccine  every year  The influenza vaccine protects you from the flu  Several types of viruses cause the flu  The viruses change over time, so new vaccines are made each year  · Get a tetanus-diphtheria (Td) booster vaccine  every 10 years  This vaccine protects you against tetanus and diphtheria  Tetanus is a severe infection that may cause painful muscle spasms and lockjaw  Diphtheria is a severe bacterial infection that causes a thick covering in the back of your mouth and throat  · Get a human papillomavirus (HPV) vaccine  if you are female and aged 23 to 32 or male 23 to 24 and never received it  This vaccine protects you from HPV infection  HPV is the most common infection spread by sexual contact  HPV may also cause vaginal, penile, and anal cancers  · Get a pneumococcal vaccine  if you are aged 72 years or older  The pneumococcal vaccine is an injection given to protect you from pneumococcal disease   Pneumococcal disease is an infection caused by pneumococcal bacteria  The infection may cause pneumonia, meningitis, or an ear infection  · Get a shingles vaccine  if you are 60 or older, even if you have had shingles before  The shingles vaccine is an injection to protect you from the varicella-zoster virus  This is the same virus that causes chickenpox  Shingles is a painful rash that develops in people who had chickenpox or have been exposed to the virus  How to eat healthy:  My Plate is a model for planning healthy meals  It shows the types and amounts of foods that should go on your plate  Fruits and vegetables make up about half of your plate, and grains and protein make up the other half  A serving of dairy is included on the side of your plate  The amount of calories and serving sizes you need depends on your age, gender, weight, and height  Examples of healthy foods are listed below:  · Eat a variety of vegetables  such as dark green, red, and orange vegetables  You can also include canned vegetables low in sodium (salt) and frozen vegetables without added butter or sauces  · Eat a variety of fresh fruits , canned fruit in 100% juice, frozen fruit, and dried fruit  · Include whole grains  At least half of the grains you eat should be whole grains  Examples include whole-wheat bread, wheat pasta, brown rice, and whole-grain cereals such as oatmeal     · Eat a variety of protein foods such as seafood (fish and shellfish), lean meat, and poultry without skin (turkey and chicken)  Examples of lean meats include pork leg, shoulder, or tenderloin, and beef round, sirloin, tenderloin, and extra lean ground beef  Other protein foods include eggs and egg substitutes, beans, peas, soy products, nuts, and seeds  · Choose low-fat dairy products such as skim or 1% milk or low-fat yogurt, cheese, and cottage cheese  · Limit unhealthy fats  such as butter, hard margarine, and shortening         Exercise:  Exercise at least 30 minutes per day on most days of the week  Some examples of exercise include walking, biking, dancing, and swimming  You can also fit in more physical activity by taking the stairs instead of the elevator or parking farther away from stores  Include muscle strengthening activities 2 days each week  Regular exercise provides many health benefits  It helps you manage your weight, and decreases your risk for type 2 diabetes, heart disease, stroke, and high blood pressure  Exercise can also help improve your mood  Ask your healthcare provider about the best exercise plan for you  General health and safety guidelines:   · Do not smoke  Nicotine and other chemicals in cigarettes and cigars can cause lung damage  Ask your healthcare provider for information if you currently smoke and need help to quit  E-cigarettes or smokeless tobacco still contain nicotine  Talk to your healthcare provider before you use these products  · Limit alcohol  A drink of alcohol is 12 ounces of beer, 5 ounces of wine, or 1½ ounces of liquor  · Lose weight, if needed  Being overweight increases your risk of certain health conditions  These include heart disease, high blood pressure, type 2 diabetes, and certain types of cancer  · Protect your skin  Do not sunbathe or use tanning beds  Use sunscreen with a SPF 15 or higher  Apply sunscreen at least 15 minutes before you go outside  Reapply sunscreen every 2 hours  Wear protective clothing, hats, and sunglasses when you are outside  · Drive safely  Always wear your seatbelt  Make sure everyone in your car wears a seatbelt  A seatbelt can save your life if you are in an accident  Do not use your cell phone when you are driving  This could distract you and cause an accident  Pull over if you need to make a call or send a text message  · Practice safe sex  Use latex condoms if are sexually active and have more than one partner   Your healthcare provider may recommend screening tests for sexually transmitted infections (STIs)  · Wear helmets, lifejackets, and protective gear  Always wear a helmet when you ride a bike or motorcycle, go skiing, or play sports that could cause a head injury  Wear protective equipment when you play sports  Wear a lifejacket when you are on a boat or doing water sports  © Copyright [employer] 2022 Information is for End User's use only and may not be sold, redistributed or otherwise used for commercial purposes  All illustrations and images included in CareNotes® are the copyrighted property of [employer] M [employer]  or [employer]   The above information is an  only  It is not intended as medical advice for individual conditions or treatments  Talk to your doctor, nurse or pharmacist before following any medical regimen to see if it is safe and effective for you  Wellness Visit for Adults   AMBULATORY CARE:   A wellness visit  is when you see your healthcare provider to get screened for health problems  Your healthcare provider will also give you advice on how to stay healthy  Write down your questions so you remember to ask them  Ask your healthcare provider how often you should have a wellness visit  What happens at a wellness visit:  Your healthcare provider will ask about your health, and your family history of health problems  This includes high blood pressure, heart disease, and cancer  He or she will ask if you have symptoms that concern you, if you smoke, and about your mood  You may also be asked about your intake of medicines, supplements, food, and alcohol  Any of the following may be done:  · Your weight  will be checked  Your height may also be checked so your body mass index (BMI) can be calculated  Your BMI shows if you are at a healthy weight  · Your blood pressure  and heart rate will be checked  Your temperature may also be checked  · Blood and urine tests  may be done   Blood tests may be done to check your cholesterol levels  Abnormal cholesterol levels increase your risk for heart disease and stroke  You may also need a blood or urine test to check for diabetes if you are at increased risk  Urine tests may be done to look for signs of an infection or kidney disease  · A physical exam  includes checking your heartbeat and lungs with a stethoscope  Your healthcare provider may also check your skin to look for sun damage  · Screening tests  may be recommended  A screening test is done to check for diseases that may not cause symptoms  The screening tests you may need depend on your age, gender, family history, and lifestyle habits  For example, colorectal screening may be recommended if you are 48years old or older  Screening tests you need if you are a woman:   · A Pap smear  is used to screen for cervical cancer  Pap smears are usually done every 3 to 5 years depending on your age  You may need them more often if you have had abnormal Pap smear test results in the past  Ask your healthcare provider how often you should have a Pap smear  · A mammogram  is an x-ray of your breasts to screen for breast cancer  Experts recommend mammograms every 2 years starting at age 48 years  You may need a mammogram at age 52 years or younger if you have an increased risk for breast cancer  Talk to your healthcare provider about when you should start having mammograms and how often you need them  Vaccines you may need:   · Get an influenza vaccine  every year  The influenza vaccine protects you from the flu  Several types of viruses cause the flu  The viruses change over time, so new vaccines are made each year  · Get a tetanus-diphtheria (Td) booster vaccine  every 10 years  This vaccine protects you against tetanus and diphtheria  Tetanus is a severe infection that may cause painful muscle spasms and lockjaw  Diphtheria is a severe bacterial infection that causes a thick covering in the back of your mouth and throat      · Get a human papillomavirus (HPV) vaccine  if you are female and aged 23 to 32 or male 23 to 24 and never received it  This vaccine protects you from HPV infection  HPV is the most common infection spread by sexual contact  HPV may also cause vaginal, penile, and anal cancers  · Get a pneumococcal vaccine  if you are aged 72 years or older  The pneumococcal vaccine is an injection given to protect you from pneumococcal disease  Pneumococcal disease is an infection caused by pneumococcal bacteria  The infection may cause pneumonia, meningitis, or an ear infection  · Get a shingles vaccine  if you are 60 or older, even if you have had shingles before  The shingles vaccine is an injection to protect you from the varicella-zoster virus  This is the same virus that causes chickenpox  Shingles is a painful rash that develops in people who had chickenpox or have been exposed to the virus  How to eat healthy:  My Plate is a model for planning healthy meals  It shows the types and amounts of foods that should go on your plate  Fruits and vegetables make up about half of your plate, and grains and protein make up the other half  A serving of dairy is included on the side of your plate  The amount of calories and serving sizes you need depends on your age, gender, weight, and height  Examples of healthy foods are listed below:  · Eat a variety of vegetables  such as dark green, red, and orange vegetables  You can also include canned vegetables low in sodium (salt) and frozen vegetables without added butter or sauces  · Eat a variety of fresh fruits , canned fruit in 100% juice, frozen fruit, and dried fruit  · Include whole grains  At least half of the grains you eat should be whole grains   Examples include whole-wheat bread, wheat pasta, brown rice, and whole-grain cereals such as oatmeal     · Eat a variety of protein foods such as seafood (fish and shellfish), lean meat, and poultry without skin (turkey and chicken)  Examples of lean meats include pork leg, shoulder, or tenderloin, and beef round, sirloin, tenderloin, and extra lean ground beef  Other protein foods include eggs and egg substitutes, beans, peas, soy products, nuts, and seeds  · Choose low-fat dairy products such as skim or 1% milk or low-fat yogurt, cheese, and cottage cheese  · Limit unhealthy fats  such as butter, hard margarine, and shortening  Exercise:  Exercise at least 30 minutes per day on most days of the week  Some examples of exercise include walking, biking, dancing, and swimming  You can also fit in more physical activity by taking the stairs instead of the elevator or parking farther away from stores  Include muscle strengthening activities 2 days each week  Regular exercise provides many health benefits  It helps you manage your weight, and decreases your risk for type 2 diabetes, heart disease, stroke, and high blood pressure  Exercise can also help improve your mood  Ask your healthcare provider about the best exercise plan for you  General health and safety guidelines:   · Do not smoke  Nicotine and other chemicals in cigarettes and cigars can cause lung damage  Ask your healthcare provider for information if you currently smoke and need help to quit  E-cigarettes or smokeless tobacco still contain nicotine  Talk to your healthcare provider before you use these products  · Limit alcohol  A drink of alcohol is 12 ounces of beer, 5 ounces of wine, or 1½ ounces of liquor  · Lose weight, if needed  Being overweight increases your risk of certain health conditions  These include heart disease, high blood pressure, type 2 diabetes, and certain types of cancer  · Protect your skin  Do not sunbathe or use tanning beds  Use sunscreen with a SPF 15 or higher  Apply sunscreen at least 15 minutes before you go outside  Reapply sunscreen every 2 hours   Wear protective clothing, hats, and sunglasses when you are outside  · Drive safely  Always wear your seatbelt  Make sure everyone in your car wears a seatbelt  A seatbelt can save your life if you are in an accident  Do not use your cell phone when you are driving  This could distract you and cause an accident  Pull over if you need to make a call or send a text message  · Practice safe sex  Use latex condoms if are sexually active and have more than one partner  Your healthcare provider may recommend screening tests for sexually transmitted infections (STIs)  · Wear helmets, lifejackets, and protective gear  Always wear a helmet when you ride a bike or motorcycle, go skiing, or play sports that could cause a head injury  Wear protective equipment when you play sports  Wear a lifejacket when you are on a boat or doing water sports  © Copyright [employer] 2022 Information is for End User's use only and may not be sold, redistributed or otherwise used for commercial purposes  All illustrations and images included in CareNotes® are the copyrighted property of [employer]  or [facility] Brenda Noland   The above information is an  only  It is not intended as medical advice for individual conditions or treatments  Talk to your doctor, nurse or pharmacist before following any medical regimen to see if it is safe and effective for you

## 2022-05-06 NOTE — PROGRESS NOTES
ADULT ANNUAL [address]  FAMILY PRACTICE    NAME: Fay Winn  AGE: 55 y o  SEX: female  : 1965     DATE: 2022     Assessment and Plan:     Problem List Items Addressed This Visit    None     Visit Diagnoses     Annual physical exam    -  Primary    Primary hypertension        discontinue losartan 50 and start losartan hctz combination pill  BP still elevated systolic 468Y occasionally but diastolic regularly in 18S  f/u in 4 weeks    Relevant Medications    losartan-hydrochlorothiazide (HYZAAR) 50-12 5 mg per tablet        Osteoporosis: No history of pathologic fractures  No steroid use, smoking, risk of falls, excessive alcohol use  Will start at age 73 unless otherwise indicated  Current indication:NA    Cancer Screening  Cervical Cancer screenin-29 Q3Y with cytology alone, 30-65 Q3Y if just cytology or Q5Y if Cytology with HPV cotesting   History of Abnormal Pap? NA    Last Pap: reported as  and reported as normal by gyn  Had a repeat visit last month  Patient had a total hysterectomy in   So no longer applicable  Colon Cancer screenin-66 years old   Family history of colon cancer? none    Last colonoscopy: 10/09/2018  She had a benign but precancerous colon polyp removed  Was advised to repeat colonoscopy in 3 years which should have been   Breast Cancer screenin-69 years of age every 2 years   Family history of breast cancer? none     Last mammogram: 2022- normal    Patient never smoked, doesnt meet criteria for lung cancer screening: People 49-80 years old with ? 20 pack year history of smoking and smoking cessation < 15 years ago  Immunizations and preventive care screenings were discussed with patient today  Appropriate education was printed on patient's after visit summary      Counseling:  Alcohol/drug use: discussed moderation in alcohol intake, the recommendations for healthy alcohol use, and avoidance of illicit drug use  Dental Health: discussed importance of regular tooth brushing, flossing, and dental visits  Injury prevention: discussed safety/seat belts, safety helmets, smoke detectors, carbon dioxide detectors, and smoking near bedding or upholstery  Sexual health: discussed sexually transmitted diseases, partner selection, use of condoms, avoidance of unintended pregnancy, and contraceptive alternatives  · Exercise: the importance of regular exercise/physical activity was discussed  Recommend exercise 3-5 times per week for at least 30 minutes  Return in about 4 weeks (around 6/7/2022) for Hypertension  Chief Complaint:     Chief Complaint   Patient presents with    Physical Exam      History of Present Illness:     Adult Annual Physical   Patient here for a comprehensive physical exam  The patient reports no problems  Diet and Physical Activity  · Diet/Nutrition: well balanced diet, frequent junk food and consuming 3-5 servings of fruits/vegetables daily  · Exercise: no formal exercise  Depression Screening  PHQ-2/9 Depression Screening    Little interest or pleasure in doing things: 0 - not at all  Feeling down, depressed, or hopeless: 0 - not at all  PHQ-2 Score: 0  PHQ-2 Interpretation: Negative depression screen       General Health  · Sleep: sleeps poorly and gets 4-6 hours of sleep on average  · Hearing: normal - bilateral   · Vision: no vision problems, goes for regular eye exams and wears contacts  · Dental: regular dental visits, brushes teeth twice daily and flosses teeth occasionally  /GYN Health  · Patient is: postmenopausal  · Last menstrual period: Hysterectomy  · Contraceptive method: as above       Review of Systems:     Review of Systems   Past Medical History:     Past Medical History:   Diagnosis Date    Abnormal uterine bleeding     Class 1 obesity due to excess calories in adult 12/9/2020    Contact lens overwear of both eyes     Glaucoma     borderline recently started on lumigan     Motion sickness     Seasonal allergies     Uterine fibroid     Wears glasses       Past Surgical History:     Past Surgical History:   Procedure Laterality Date    BREAST BIOPSY Right 15yrs benign    CYSTOSCOPY N/A 12/9/2020    Procedure: CYSTOSCOPY;  Surgeon: Buck Marroquin MD;  Location: [facility];  Service: Gynecology    HYSTERECTOMY      WA COLONOSCOPY FLX DX W/COLLJ [address] PFRMD N/A 10/9/2018    Procedure: COLONOSCOPY;  Surgeon: Ovidio Shaw MD;  Location: [facility];  Service: Gastroenterology    WA LAP,RMV  ADNEXAL STRUCTURE Bilateral 12/9/2020    Procedure: SALPINGECTOMY;  Surgeon: Buck Marroquin MD;  Location: [facility];  Service: Gynecology    WA LAPAROSCOPY W TOT HYSTERECT UTERUS 250 GRAM OR LESS N/A 12/9/2020    Procedure: LAP TOTAL HYSTERECTOMY;  Surgeon: Buck Marroquin MD;  Location: [facility];  Service: Gynecology    TUBAL LIGATION      VAGINAL DELIVERY      x3      Social History:     Social History     Socioeconomic History    Marital status: /Civil Union     Spouse name: None    Number of children: 3    Years of education: None    Highest education level: Associate degree: occupational, technical, or vocational program   Occupational History     Comment: manager for work study program   Tobacco Use    Smoking status: Never Smoker    Smokeless tobacco: Never Used   Vaping Use    Vaping Use: Never used   Substance and Sexual Activity    Alcohol use:  Yes     Alcohol/week: 0 0 standard drinks     Comment: 1 or 2 alcoholic  beverages on holidays    Drug use: No    Sexual activity: Not Currently     Partners: Male   Other Topics Concern    None   Social History Narrative    Lives in house- wife  piter and daughter dawn     Social Determinants of Health     Financial Resource Strain: Not on file   Food Insecurity: Not on file   Transportation Needs: Not on file   Physical Activity: Not on file   Stress: Not on file   Social Connections: Not on file   Intimate Partner Violence: Not on file   Housing Stability: Not on file      Family History:     Family History   Adopted: Yes   Problem Relation Age of Onset    Hypothyroidism Daughter     Irritable bowel syndrome Daughter     Irritable bowel syndrome Son       Current Medications:     Current Outpatient Medications   Medication Sig Dispense Refill    bimatoprost (LUMIGAN) 0 01 % ophthalmic drops Administer 1 drop to both eyes daily at bedtime      Blood Pressure Monitoring (Blood Pressure Cuff) MISC Use daily 1 each 0    cetirizine (ZyrTEC) 10 mg tablet Take 10 mg by mouth as needed for allergies      Omega-3 Fatty Acids (EQL FISH OIL PO) Take by mouth      losartan-hydrochlorothiazide (HYZAAR) 50-12 5 mg per tablet Take 1 tablet by mouth daily 30 tablet 0     No current facility-administered medications for this visit  Allergies:     No Known Allergies      Physical Exam:     /82 (BP Location: Left arm, Patient Position: Sitting)   Pulse 93   Temp 97 9 °F (36 6 °C) (Tympanic)   Ht 5' 3 5" (1 613 m)   Wt 94 3 kg (207 lb 12 8 oz)   LMP 11/02/2020 (Exact Date)   SpO2 98%   BMI 36 23 kg/m²     Physical Exam  Vitals and nursing note reviewed  Constitutional:       General: She is not in acute distress  Appearance: Normal appearance  She is well-developed  She is not ill-appearing, toxic-appearing or diaphoretic  HENT:      Head: Normocephalic and atraumatic  Right Ear: Tympanic membrane, ear canal and external ear normal       Left Ear: Tympanic membrane, ear canal and external ear normal       Nose: Nose normal  No congestion or rhinorrhea  Mouth/Throat:      Mouth: Mucous membranes are moist       Pharynx: Oropharynx is clear  No oropharyngeal exudate or posterior oropharyngeal erythema  Eyes:      Extraocular Movements: Extraocular movements intact        Conjunctiva/sclera: Conjunctivae normal       Pupils: Pupils are equal, round, and reactive to light  Neck:      Vascular: No carotid bruit  Cardiovascular:      Rate and Rhythm: Normal rate and regular rhythm  Pulses: Normal pulses  Heart sounds: Normal heart sounds  No murmur heard  No friction rub  No gallop  Pulmonary:      Effort: Pulmonary effort is normal  No respiratory distress  Breath sounds: Normal breath sounds  No wheezing or rales  Abdominal:      General: Bowel sounds are normal       Palpations: Abdomen is soft  Tenderness: There is no abdominal tenderness  There is no guarding  Musculoskeletal:         General: Normal range of motion  Cervical back: Neck supple  No muscular tenderness  Right lower leg: No edema  Left lower leg: No edema  Lymphadenopathy:      Cervical: Cervical adenopathy (single right anterior cervical neck LN- round movable, non tender, no overlying skin changes) present  Skin:     General: Skin is warm and dry  Findings: No lesion  Neurological:      General: No focal deficit present  Mental Status: She is alert  Cranial Nerves: No cranial nerve deficit  Sensory: No sensory deficit  Motor: No weakness            Viktor Selby MD  [id number] [facility]

## 2022-05-10 ENCOUNTER — OFFICE VISIT (OUTPATIENT)
Dept: FAMILY MEDICINE CLINIC | Facility: CLINIC | Age: 57
End: 2022-05-10
Payer: COMMERCIAL

## 2022-05-10 VITALS
OXYGEN SATURATION: 98 % | SYSTOLIC BLOOD PRESSURE: 140 MMHG | HEART RATE: 93 BPM | TEMPERATURE: 97.9 F | BODY MASS INDEX: 35.48 KG/M2 | DIASTOLIC BLOOD PRESSURE: 82 MMHG | WEIGHT: 207.8 LBS | HEIGHT: 64 IN

## 2022-05-10 DIAGNOSIS — Z12.11 COLON CANCER SCREENING: ICD-10-CM

## 2022-05-10 DIAGNOSIS — I10 PRIMARY HYPERTENSION: ICD-10-CM

## 2022-05-10 DIAGNOSIS — Z00.00 ANNUAL PHYSICAL EXAM: Primary | ICD-10-CM

## 2022-05-10 PROCEDURE — 3725F SCREEN DEPRESSION PERFORMED: CPT | Performed by: FAMILY MEDICINE

## 2022-05-10 PROCEDURE — 1036F TOBACCO NON-USER: CPT | Performed by: FAMILY MEDICINE

## 2022-05-10 PROCEDURE — 99396 PREV VISIT EST AGE 40-64: CPT | Performed by: FAMILY MEDICINE

## 2022-05-10 PROCEDURE — 3008F BODY MASS INDEX DOCD: CPT | Performed by: FAMILY MEDICINE

## 2022-05-10 RX ORDER — LOSARTAN POTASSIUM AND HYDROCHLOROTHIAZIDE 12.5; 5 MG/1; MG/1
1 TABLET ORAL DAILY
Qty: 30 TABLET | Refills: 0 | Status: SHIPPED | OUTPATIENT
Start: 2022-05-10 | End: 2022-06-07

## 2022-06-07 ENCOUNTER — OFFICE VISIT (OUTPATIENT)
Dept: FAMILY MEDICINE CLINIC | Facility: CLINIC | Age: 57
End: 2022-06-07
Payer: COMMERCIAL

## 2022-06-07 VITALS
BODY MASS INDEX: 35 KG/M2 | TEMPERATURE: 97.7 F | OXYGEN SATURATION: 98 % | HEIGHT: 64 IN | SYSTOLIC BLOOD PRESSURE: 126 MMHG | WEIGHT: 205 LBS | HEART RATE: 88 BPM | DIASTOLIC BLOOD PRESSURE: 84 MMHG

## 2022-06-07 DIAGNOSIS — I10 PRIMARY HYPERTENSION: Primary | ICD-10-CM

## 2022-06-07 PROCEDURE — 99214 OFFICE O/P EST MOD 30 MIN: CPT | Performed by: FAMILY MEDICINE

## 2022-06-07 RX ORDER — LOSARTAN POTASSIUM AND HYDROCHLOROTHIAZIDE 12.5; 1 MG/1; MG/1
1 TABLET ORAL DAILY
Qty: 30 TABLET | Refills: 0 | Status: SHIPPED | OUTPATIENT
Start: 2022-06-07 | End: 2022-07-06 | Stop reason: SDUPTHER

## 2022-06-07 NOTE — PROGRESS NOTES
Assessment/Plan:      Diagnoses and all orders for this visit:    Primary hypertension  -     losartan-hydrochlorothiazide (HYZAAR) 100-12 5 MG per tablet; Take 1 tablet by mouth daily  -     Basic metabolic panel; Future          Return in about 4 weeks (around 7/5/2022) for Hypertension  The following portions of the patient's history were reviewed and updated as appropriate: allergies, current medications, past family history, past medical history, past social history, past surgical history, and problem list      Subjective:     Patient ID: Chelsey Manning is a 55 y o  female  HPI    Patient here for 4 week follow up from her annual where her losartan 50 mg was discontinued and she was started on losartan hydrochlorothiazide combination at 84-65 9 mg  Her systolic was 628M and the diastolics were regularly in the 90s  Today she reports compliance with new medication  She denies any side effects from the medication  Her BP readings at home have been better but diastolic has still been high  86 is lowest but remains 90s-92s on the regular  Which is still significant improvement from previous readings  Denies headache, change in vision, worsening edema, chest pain, shortness of breath, palpitations           PHQ-9 Depression Screening    Little interest or pleasure in doing things: 0 - not at all  Feeling down, depressed, or hopeless: 0 - not at all          Current Outpatient Medications on File Prior to Visit   Medication Sig Dispense Refill    bimatoprost (LUMIGAN) 0 01 % ophthalmic drops Administer 1 drop to both eyes daily at bedtime      Blood Pressure Monitoring (Blood Pressure Cuff) MISC Use daily 1 each 0    cetirizine (ZyrTEC) 10 mg tablet Take 10 mg by mouth as needed for allergies      Omega-3 Fatty Acids (EQL FISH OIL PO) Take by mouth      [DISCONTINUED] losartan-hydrochlorothiazide (HYZAAR) 50-12 5 mg per tablet Take 1 tablet by mouth daily 30 tablet 0     No current facility-administered medications on file prior to visit  Review of Systems      Objective:    Vitals:    06/07/22 1536   BP: 126/84   BP Location: Left arm   Patient Position: Sitting   Cuff Size: Large   Pulse: 88   Temp: 97 7 °F (36 5 °C)   TempSrc: Tympanic   SpO2: 98%   Weight: 93 kg (205 lb)   Height: 5' 3 5" (1 613 m)         Physical Exam  Vitals and nursing note reviewed  Constitutional:       General: She is not in acute distress  Appearance: Normal appearance  She is not ill-appearing or toxic-appearing  HENT:      Head: Normocephalic and atraumatic  Right Ear: External ear normal       Left Ear: External ear normal    Eyes:      General: No scleral icterus  Right eye: No discharge  Left eye: No discharge  Extraocular Movements: Extraocular movements intact  Conjunctiva/sclera: Conjunctivae normal    Cardiovascular:      Rate and Rhythm: Normal rate and regular rhythm  Heart sounds: Normal heart sounds  No murmur heard  No friction rub  No gallop  Pulmonary:      Effort: Pulmonary effort is normal  No respiratory distress  Breath sounds: Normal breath sounds  No wheezing or rales  Neurological:      Mental Status: She is alert

## 2022-06-28 ENCOUNTER — OFFICE VISIT (OUTPATIENT)
Dept: FAMILY MEDICINE CLINIC | Facility: CLINIC | Age: 57
End: 2022-06-28
Payer: COMMERCIAL

## 2022-06-28 VITALS
WEIGHT: 206.25 LBS | TEMPERATURE: 97.6 F | BODY MASS INDEX: 35.21 KG/M2 | HEART RATE: 74 BPM | SYSTOLIC BLOOD PRESSURE: 120 MMHG | DIASTOLIC BLOOD PRESSURE: 72 MMHG | OXYGEN SATURATION: 97 % | HEIGHT: 64 IN

## 2022-06-28 DIAGNOSIS — I10 PRIMARY HYPERTENSION: Primary | ICD-10-CM

## 2022-06-28 DIAGNOSIS — Z23 ENCOUNTER FOR IMMUNIZATION: ICD-10-CM

## 2022-06-28 PROCEDURE — 90471 IMMUNIZATION ADMIN: CPT

## 2022-06-28 PROCEDURE — 99214 OFFICE O/P EST MOD 30 MIN: CPT | Performed by: FAMILY MEDICINE

## 2022-06-28 PROCEDURE — 3008F BODY MASS INDEX DOCD: CPT

## 2022-06-28 PROCEDURE — 90750 HZV VACC RECOMBINANT IM: CPT

## 2022-06-28 PROCEDURE — 3725F SCREEN DEPRESSION PERFORMED: CPT | Performed by: FAMILY MEDICINE

## 2022-06-28 NOTE — PROGRESS NOTES
Assessment/Plan:      Diagnoses and all orders for this visit:    Primary hypertension  Comments:  continue losartan-hydrochlorothiazide 100-12 5 MG  NV to compare her cuff to manual check and ensure accurate readings at home  100-25 if high    Encounter for immunization  -     Zoster Vaccine Recombinant IM           Return in about 3 months (around 9/28/2022) for Hypertension  The following portions of the patient's history were reviewed and updated as appropriate: allergies, current medications, past family history, past medical history, past social history, past surgical history, and problem list      Subjective:     Patient ID: Maria De Jesus Dasilva is a 55 y o  female  HPI    Patient here for 4 week follow up where her losartan hydrochlorothiazide combination at 50-12 5 mg was increased to 818-55 2 mg  Her systolic was 846E and the diastolics were regularly in the 90s  Today she reports compliance with new medication  Every morning  No missed doses  She denies any side effects from the medication  Her BP readings at home have been better but diastolic has still been high  86 is lowest but remains 90s-92s on the regular  Highest was 642 diastolic  The cuff is just a few months old  Arm cuff, automatic  This is still significant improvement from previous readings but last two visits in the office have had normal BP and even on repeat today with me 122/72  Denies headache, change in vision, worsening edema, chest pain, shortness of breath, palpitations  Denies lightheadedness or dizziness           PHQ-9 Depression Screening    Little interest or pleasure in doing things: 0 - not at all  Feeling down, depressed, or hopeless: 0 - not at all          Current Outpatient Medications on File Prior to Visit   Medication Sig Dispense Refill    bimatoprost (LUMIGAN) 0 01 % ophthalmic drops Administer 1 drop to both eyes daily at bedtime      Blood Pressure Monitoring (Blood Pressure Cuff) MISC Use daily 1 each 0  cetirizine (ZyrTEC) 10 mg tablet Take 10 mg by mouth as needed for allergies      losartan-hydrochlorothiazide (HYZAAR) 100-12 5 MG per tablet Take 1 tablet by mouth daily 30 tablet 0    Omega-3 Fatty Acids (EQL FISH OIL PO) Take by mouth       No current facility-administered medications on file prior to visit  Review of Systems      Objective:    Vitals:    06/28/22 1116   BP: 120/72   BP Location: Left arm   Patient Position: Sitting   Pulse: 74   Temp: 97 6 °F (36 4 °C)   SpO2: 97%   Weight: 93 6 kg (206 lb 4 oz)   Height: 5' 3 5" (1 613 m)         Physical Exam  Vitals and nursing note reviewed  Constitutional:       General: She is not in acute distress  Appearance: Normal appearance  She is not ill-appearing or toxic-appearing  HENT:      Head: Normocephalic and atraumatic  Right Ear: External ear normal       Left Ear: External ear normal    Eyes:      General: No scleral icterus  Right eye: No discharge  Left eye: No discharge  Extraocular Movements: Extraocular movements intact  Conjunctiva/sclera: Conjunctivae normal    Cardiovascular:      Rate and Rhythm: Normal rate and regular rhythm  Heart sounds: Normal heart sounds  No murmur heard  No friction rub  No gallop  Pulmonary:      Effort: Pulmonary effort is normal  No respiratory distress  Breath sounds: Normal breath sounds  No wheezing or rales  Neurological:      Mental Status: She is alert

## 2022-06-30 ENCOUNTER — CLINICAL SUPPORT (OUTPATIENT)
Dept: FAMILY MEDICINE CLINIC | Facility: CLINIC | Age: 57
End: 2022-06-30
Payer: COMMERCIAL

## 2022-06-30 VITALS — SYSTOLIC BLOOD PRESSURE: 166 MMHG | DIASTOLIC BLOOD PRESSURE: 103 MMHG

## 2022-06-30 DIAGNOSIS — I10 HYPERTENSION, UNSPECIFIED TYPE: Primary | ICD-10-CM

## 2022-06-30 PROCEDURE — 1036F TOBACCO NON-USER: CPT

## 2022-06-30 PROCEDURE — 99211 OFF/OP EST MAY X REQ PHY/QHP: CPT

## 2022-07-06 DIAGNOSIS — I10 PRIMARY HYPERTENSION: ICD-10-CM

## 2022-07-07 RX ORDER — LOSARTAN POTASSIUM AND HYDROCHLOROTHIAZIDE 12.5; 1 MG/1; MG/1
1 TABLET ORAL DAILY
Qty: 30 TABLET | Refills: 0 | Status: SHIPPED | OUTPATIENT
Start: 2022-07-07 | End: 2022-08-07 | Stop reason: SDUPTHER

## 2022-08-07 DIAGNOSIS — I10 PRIMARY HYPERTENSION: ICD-10-CM

## 2022-08-08 RX ORDER — LOSARTAN POTASSIUM AND HYDROCHLOROTHIAZIDE 12.5; 1 MG/1; MG/1
1 TABLET ORAL DAILY
Qty: 30 TABLET | Refills: 0 | Status: SHIPPED | OUTPATIENT
Start: 2022-08-08 | End: 2022-09-07 | Stop reason: SDUPTHER

## 2022-09-07 DIAGNOSIS — I10 PRIMARY HYPERTENSION: ICD-10-CM

## 2022-09-07 RX ORDER — LOSARTAN POTASSIUM AND HYDROCHLOROTHIAZIDE 12.5; 1 MG/1; MG/1
1 TABLET ORAL DAILY
Qty: 30 TABLET | Refills: 0 | Status: SHIPPED | OUTPATIENT
Start: 2022-09-07 | End: 2022-09-28

## 2022-09-28 ENCOUNTER — OFFICE VISIT (OUTPATIENT)
Dept: FAMILY MEDICINE CLINIC | Facility: CLINIC | Age: 57
End: 2022-09-28
Payer: COMMERCIAL

## 2022-09-28 VITALS
TEMPERATURE: 96.7 F | WEIGHT: 209 LBS | BODY MASS INDEX: 35.68 KG/M2 | DIASTOLIC BLOOD PRESSURE: 74 MMHG | HEART RATE: 68 BPM | HEIGHT: 64 IN | OXYGEN SATURATION: 100 % | SYSTOLIC BLOOD PRESSURE: 132 MMHG

## 2022-09-28 DIAGNOSIS — I10 PRIMARY HYPERTENSION: Primary | ICD-10-CM

## 2022-09-28 DIAGNOSIS — Z23 ENCOUNTER FOR IMMUNIZATION: ICD-10-CM

## 2022-09-28 PROCEDURE — 3725F SCREEN DEPRESSION PERFORMED: CPT | Performed by: FAMILY MEDICINE

## 2022-09-28 PROCEDURE — 99214 OFFICE O/P EST MOD 30 MIN: CPT | Performed by: FAMILY MEDICINE

## 2022-09-28 RX ORDER — LOSARTAN POTASSIUM AND HYDROCHLOROTHIAZIDE 25; 100 MG/1; MG/1
1 TABLET ORAL DAILY
Qty: 30 TABLET | Refills: 0 | Status: SHIPPED | OUTPATIENT
Start: 2022-09-28 | End: 2022-10-16 | Stop reason: SDUPTHER

## 2022-09-28 NOTE — PROGRESS NOTES
Assessment/Plan:      Diagnoses and all orders for this visit:    Primary hypertension  -     losartan-hydrochlorothiazide (HYZAAR) 100-25 MG per tablet; Take 1 tablet by mouth daily    Encounter for immunization  Comments:  wants to wait until the next visit           Return in about 27 days (around 10/25/2022) for Hypertension  The following portions of the patient's history were reviewed and updated as appropriate: allergies, current medications, past family history, past medical history, past social history, past surgical history, and problem list      Subjective:     Patient ID: Alla Pinto is a 55 y o  female  HPI    Patient here for 4 week follow up where her losartan hydrochlorothiazide combination at 50-12 5 mg was increased to 248-04 7 mg  Her systolic was 537F and the diastolics were regularly in the 90s  Today she reports compliance with new medication  Every morning  No missed doses  She denies any side effects from the medication  Her BP readings at home have been better but diastolic has still been high  86 is lowest but remains 90s-92s on the regular  The cuff is just a few months old  Arm cuff, automatic  Has not been checking daily though  Denies headache, change in vision, worsening edema, chest pain, shortness of breath, palpitations  Denies lightheadedness or dizziness  If anything feels overall improvement since being started on medications  Reports the elevations ar usually around >15 hours after taking the medication as takes it 530 AM  Patient reports general healthy diet but struggles with workouts  Advised to exercise in am  Start with setting alarm 5-10 minutes early and then increase weekly by similar amount          PHQ-9 Depression Screening    Little interest or pleasure in doing things: 0 - not at all  Feeling down, depressed, or hopeless: 0 - not at all          Current Outpatient Medications on File Prior to Visit   Medication Sig Dispense Refill    bimatoprost (LUMIGAN) 0 01 % ophthalmic drops Administer 1 drop to both eyes daily at bedtime      Blood Pressure Monitoring (Blood Pressure Cuff) MISC Use daily 1 each 0    cetirizine (ZyrTEC) 10 mg tablet Take 10 mg by mouth as needed for allergies      Omega-3 Fatty Acids (EQL FISH OIL PO) Take by mouth       No current facility-administered medications on file prior to visit  Review of Systems      Objective:    Vitals:    09/28/22 1509   BP: 132/74   BP Location: Left arm   Patient Position: Sitting   Cuff Size: Large   Pulse: 68   Temp: (!) 96 7 °F (35 9 °C)   TempSrc: Tympanic   SpO2: 100%   Weight: 94 8 kg (209 lb)   Height: 5' 3 5" (1 613 m)         Physical Exam  Vitals and nursing note reviewed  Constitutional:       General: She is not in acute distress  Appearance: Normal appearance  She is not ill-appearing or toxic-appearing  HENT:      Head: Normocephalic and atraumatic  Right Ear: External ear normal       Left Ear: External ear normal    Eyes:      General: No scleral icterus  Right eye: No discharge  Left eye: No discharge  Extraocular Movements: Extraocular movements intact  Conjunctiva/sclera: Conjunctivae normal    Cardiovascular:      Rate and Rhythm: Normal rate and regular rhythm  Heart sounds: Normal heart sounds  No murmur heard  No friction rub  No gallop  Pulmonary:      Effort: Pulmonary effort is normal  No respiratory distress  Breath sounds: Normal breath sounds  No wheezing or rales  Neurological:      Mental Status: She is alert

## 2022-10-16 DIAGNOSIS — I10 PRIMARY HYPERTENSION: ICD-10-CM

## 2022-10-17 RX ORDER — LOSARTAN POTASSIUM AND HYDROCHLOROTHIAZIDE 25; 100 MG/1; MG/1
1 TABLET ORAL DAILY
Qty: 30 TABLET | Refills: 0 | Status: SHIPPED | OUTPATIENT
Start: 2022-10-17 | End: 2022-10-25 | Stop reason: SDUPTHER

## 2022-10-25 ENCOUNTER — OFFICE VISIT (OUTPATIENT)
Dept: FAMILY MEDICINE CLINIC | Facility: CLINIC | Age: 57
End: 2022-10-25
Payer: COMMERCIAL

## 2022-10-25 VITALS
WEIGHT: 211 LBS | DIASTOLIC BLOOD PRESSURE: 74 MMHG | HEIGHT: 64 IN | BODY MASS INDEX: 36.02 KG/M2 | TEMPERATURE: 98.3 F | HEART RATE: 77 BPM | OXYGEN SATURATION: 98 % | SYSTOLIC BLOOD PRESSURE: 122 MMHG

## 2022-10-25 DIAGNOSIS — I10 PRIMARY HYPERTENSION: Primary | ICD-10-CM

## 2022-10-25 DIAGNOSIS — Z23 ENCOUNTER FOR IMMUNIZATION: ICD-10-CM

## 2022-10-25 PROCEDURE — 99213 OFFICE O/P EST LOW 20 MIN: CPT | Performed by: FAMILY MEDICINE

## 2022-10-25 PROCEDURE — 91312 SARSCOV2 VAC BVL 30MCG/0.3ML: CPT

## 2022-10-25 PROCEDURE — 0124A ADM SARSCV2 BVL 30MCG/.3ML B: CPT

## 2022-10-25 RX ORDER — LOSARTAN POTASSIUM AND HYDROCHLOROTHIAZIDE 25; 100 MG/1; MG/1
1 TABLET ORAL DAILY
Qty: 90 TABLET | Refills: 0 | Status: SHIPPED | OUTPATIENT
Start: 2022-10-25 | End: 2023-01-23

## 2022-10-25 NOTE — PROGRESS NOTES
Assessment/Plan:      Diagnoses and all orders for this visit:    Primary hypertension  -     losartan-hydrochlorothiazide (HYZAAR) 100-25 MG per tablet; Take 1 tablet by mouth daily    Encounter for immunization  -     Age 15 y+, BOOSTER (BIVALENT): Jaye Limon vac bivalent [id number]-sucr           Return in about 3 months (around 1/25/2023) for Hypertension  The following portions of the patient's history were reviewed and updated as appropriate: allergies, current medications, past family history, past medical history, past social history, past surgical history, and problem list      Subjective:     Patient ID: Timothy Penn is a 55 y o  female  HPI    Patient here for 4 week follow up where her losartan hydrochlorothiazide combination at 100-12 5 mg  Today she reports compliance with new medication  Every morning  No missed doses  She denies any side effects from the medication  Her BP readings at home have been better and diastolic has not been high anymore  70s-80s on the regular  Not getting  The cuff is just a few months old  Arm cuff, automatic  Has not been checking daily though  Denies headache, change in vision, worsening edema, chest pain, shortness of breath, palpitations  Denies lightheadedness or dizziness      -She will continue current dose  #90 at next fill  BMP advised within the week to ensure creatinine and electrolytes within normal limits   Patient verbalized understanding    -Will make NV for flu and shingles separately as does not want to do them together    PHQ-9 Depression Screening    Little interest or pleasure in doing things: 0 - not at all  Feeling down, depressed, or hopeless: 0 - not at all          Current Outpatient Medications on File Prior to Visit   Medication Sig Dispense Refill   • bimatoprost (LUMIGAN) 0 01 % ophthalmic drops Administer 1 drop to both eyes daily at bedtime     • Blood Pressure Monitoring (Blood Pressure Cuff) MISC Use daily 1 each 0   • cetirizine (ZyrTEC) 10 mg tablet Take 10 mg by mouth as needed for allergies     • Omega-3 Fatty Acids (EQL FISH OIL PO) Take by mouth       No current facility-administered medications on file prior to visit  Review of Systems      Objective:    Vitals:    10/25/22 1534   BP: 122/74   Pulse: 77   Temp: 98 3 °F (36 8 °C)   SpO2: 98%   Weight: 95 7 kg (211 lb)   Height: 5' 3 5" (1 613 m)         Physical Exam  Vitals and nursing note reviewed  Constitutional:       General: She is not in acute distress  Appearance: Normal appearance  She is not ill-appearing or toxic-appearing  HENT:      Head: Normocephalic and atraumatic  Right Ear: External ear normal       Left Ear: External ear normal    Eyes:      General: No scleral icterus  Right eye: No discharge  Left eye: No discharge  Extraocular Movements: Extraocular movements intact  Conjunctiva/sclera: Conjunctivae normal    Cardiovascular:      Rate and Rhythm: Normal rate and regular rhythm  Heart sounds: Normal heart sounds  No murmur heard  No friction rub  No gallop  Pulmonary:      Effort: Pulmonary effort is normal  No respiratory distress  Breath sounds: Normal breath sounds  No wheezing or rales  Neurological:      Mental Status: She is alert

## 2022-11-15 ENCOUNTER — IMMUNIZATIONS (OUTPATIENT)
Dept: FAMILY MEDICINE CLINIC | Facility: CLINIC | Age: 57
End: 2022-11-15

## 2022-11-15 DIAGNOSIS — Z23 ENCOUNTER FOR IMMUNIZATION: Primary | ICD-10-CM

## 2023-01-21 ENCOUNTER — APPOINTMENT (OUTPATIENT)
Dept: LAB | Facility: HOSPITAL | Age: 58
End: 2023-01-21

## 2023-01-21 DIAGNOSIS — I10 PRIMARY HYPERTENSION: ICD-10-CM

## 2023-01-21 LAB
ANION GAP SERPL CALCULATED.3IONS-SCNC: 9 MMOL/L (ref 4–13)
BUN SERPL-MCNC: 14 MG/DL (ref 5–25)
CALCIUM SERPL-MCNC: 9.2 MG/DL (ref 8.4–10.2)
CHLORIDE SERPL-SCNC: 102 MMOL/L (ref 96–108)
CO2 SERPL-SCNC: 27 MMOL/L (ref 21–32)
CREAT SERPL-MCNC: 0.8 MG/DL (ref 0.6–1.3)
GFR SERPL CREATININE-BSD FRML MDRD: 82 ML/MIN/1.73SQ M
GLUCOSE P FAST SERPL-MCNC: 95 MG/DL (ref 65–99)
POTASSIUM SERPL-SCNC: 3.4 MMOL/L (ref 3.5–5.3)
SODIUM SERPL-SCNC: 138 MMOL/L (ref 135–147)

## 2023-01-25 PROBLEM — I10 PRIMARY HYPERTENSION: Status: ACTIVE | Noted: 2023-01-25

## 2023-02-12 DIAGNOSIS — I10 PRIMARY HYPERTENSION: ICD-10-CM

## 2023-02-13 RX ORDER — LOSARTAN POTASSIUM AND HYDROCHLOROTHIAZIDE 25; 100 MG/1; MG/1
1 TABLET ORAL DAILY
Qty: 90 TABLET | Refills: 0 | Status: SHIPPED | OUTPATIENT
Start: 2023-02-13 | End: 2023-05-14

## 2023-03-01 ENCOUNTER — APPOINTMENT (EMERGENCY)
Dept: RADIOLOGY | Facility: HOSPITAL | Age: 58
End: 2023-03-01

## 2023-03-01 ENCOUNTER — HOSPITAL ENCOUNTER (EMERGENCY)
Facility: HOSPITAL | Age: 58
Discharge: HOME | End: 2023-03-01
Attending: EMERGENCY MEDICINE

## 2023-03-01 VITALS
RESPIRATION RATE: 17 BRPM | WEIGHT: 200 LBS | BODY MASS INDEX: 34.15 KG/M2 | SYSTOLIC BLOOD PRESSURE: 175 MMHG | OXYGEN SATURATION: 94 % | TEMPERATURE: 98.3 F | HEIGHT: 64 IN | DIASTOLIC BLOOD PRESSURE: 74 MMHG | HEART RATE: 80 BPM

## 2023-03-01 DIAGNOSIS — S82.409A FIBULA FRACTURE: Primary | ICD-10-CM

## 2023-03-01 RX ORDER — ACETAMINOPHEN 325 MG/1
975 TABLET ORAL ONCE
Status: COMPLETED | OUTPATIENT
Start: 2023-03-01 | End: 2023-03-01

## 2023-03-01 RX ORDER — IBUPROFEN 600 MG/1
600 TABLET ORAL ONCE
Status: DISCONTINUED | OUTPATIENT
Start: 2023-03-01 | End: 2023-03-01 | Stop reason: HOSPADM

## 2023-03-01 RX ADMIN — ACETAMINOPHEN 975 MG: 325 TABLET ORAL at 09:41

## 2023-03-01 NOTE — ED PROVIDER NOTES
History  Chief Complaint   Patient presents with   • Ankle Injury     Patient reports slipping on ice, rolling right ankle  Patient is a 36-year-old female presents for evaluation of a right ankle injury  Patient is here with her coworker  He says that they were cleaning off their cars when she slipped ice getting out of the vehicle  She did not hit her head or lose consciousness  She is not on any blood thinners  She is complaining of right lateral ankle pain  She has not attempted to put any weight on her foot since the incident  She denies any numbness or tingling  On exam, she has some mild swelling and ecchymosis to the lateral malleolus  The foot is neurovascularly intact  There is no obvious deformity  There is no proximal tibial tenderness  Prior to Admission Medications   Prescriptions Last Dose Informant Patient Reported? Taking?    Blood Pressure Monitoring (Blood Pressure Cuff) MISC Past Week  No Yes   Sig: Use daily   Omega-3 Fatty Acids (EQL FISH OIL PO) Past Month  Yes Yes   Sig: Take by mouth   bimatoprost (LUMIGAN) 0 01 % ophthalmic drops Past Week  Yes Yes   Sig: Administer 1 drop to both eyes daily at bedtime   cetirizine (ZyrTEC) 10 mg tablet Past Month  Yes Yes   Sig: Take 10 mg by mouth as needed for allergies   losartan-hydrochlorothiazide (HYZAAR) 100-25 MG per tablet 3/1/2023  No Yes   Sig: Take 1 tablet by mouth daily      Facility-Administered Medications: None       Past Medical History:   Diagnosis Date   • Abnormal uterine bleeding    • Class 1 obesity due to excess calories in adult 12/09/2020   • Contact lens overwear of both eyes    • Glaucoma     borderline recently started on lumigan    • Hypertension    • Motion sickness    • Seasonal allergies    • Uterine fibroid    • Wears glasses        Past Surgical History:   Procedure Laterality Date   • BREAST BIOPSY Right 15yrs benign   • CYSTOSCOPY N/A 12/9/2020    Procedure: CYSTOSCOPY;  Surgeon: Fela Best Ryne Mills MD;  [facility];  Service: Gynecology   • HYSTERECTOMY     • FL COLONOSCOPY FLX DX W/COLLJ SPEC WHEN PFRMD N/A 10/9/2018    Procedure: COLONOSCOPY;  Surgeon: Albertina Fisher MD;  [facility];  Service: Gastroenterology   • FL LAPAROSCOPY W TOTAL HYSTERECTOMY UTERUS 250 GM/< N/A 12/9/2020    Procedure: LAP TOTAL HYSTERECTOMY;  Surgeon: Elda Duran MD;  [facility];  Service: Gynecology   • FL LAPAROSCOPY W/RMVL ADNEXAL STRUCTURES Bilateral 12/9/2020    Procedure: SALPINGECTOMY;  Surgeon: Elda Duran MD;  [facility];  Service: Gynecology   • TUBAL LIGATION     • VAGINAL DELIVERY      x3       Family History   Adopted: Yes   Problem Relation Age of Onset   • Hypothyroidism Daughter    • Irritable bowel syndrome Daughter    • Irritable bowel syndrome Son      I have reviewed and agree with the history as documented  E-Cigarette/Vaping   • E-Cigarette Use Never User      E-Cigarette/Vaping Substances     Social History     Tobacco Use   • Smoking status: Never   • Smokeless tobacco: Never   Vaping Use   • Vaping Use: Never used   Substance Use Topics   • Alcohol use: Yes     Alcohol/week: 0 0 standard drinks     Comment: 1 or 2 alcoholic  beverages on holidays   • Drug use: No       Review of Systems   Constitutional: Negative for fever and unexpected weight change  HENT: Negative for congestion, ear pain, sore throat and trouble swallowing  Eyes: Negative for pain and redness  Respiratory: Negative for cough, chest tightness and shortness of breath  Cardiovascular: Negative for chest pain and leg swelling  Gastrointestinal: Negative for abdominal distention, abdominal pain, diarrhea and vomiting  Endocrine: Negative for polyuria  Genitourinary: Negative for dysuria, hematuria, pelvic pain and vaginal bleeding  Musculoskeletal: Positive for arthralgias (R ankle/foot)  Negative for back pain and myalgias  Skin: Negative for rash  Neurological: Negative for dizziness, syncope, weakness, light-headedness and headaches  Physical Exam  Physical Exam  Vitals and nursing note reviewed  Constitutional:       General: She is not in acute distress  Appearance: She is well-developed  HENT:      Head: Normocephalic and atraumatic  Right Ear: External ear normal       Left Ear: External ear normal       Nose: Nose normal       Mouth/Throat:      Mouth: Mucous membranes are moist       Pharynx: No oropharyngeal exudate  Eyes:      Conjunctiva/sclera: Conjunctivae normal       Pupils: Pupils are equal, round, and reactive to light  Cardiovascular:      Rate and Rhythm: Normal rate and regular rhythm  Heart sounds: Normal heart sounds  No murmur heard  No friction rub  No gallop  Pulmonary:      Effort: Pulmonary effort is normal  No respiratory distress  Breath sounds: Normal breath sounds  No wheezing or rales  Abdominal:      General: There is no distension  Palpations: Abdomen is soft  Tenderness: There is no abdominal tenderness  There is no guarding  Musculoskeletal:         General: Swelling, tenderness (lateral malleolous) and signs of injury present  No deformity  Normal range of motion  Cervical back: Normal range of motion and neck supple  Comments: R foot neurovascularly intact   Lymphadenopathy:      Cervical: No cervical adenopathy  Skin:     General: Skin is warm and dry  Neurological:      General: No focal deficit present  Mental Status: She is alert and oriented to person, place, and time  Mental status is at baseline  Cranial Nerves: No cranial nerve deficit  Sensory: No sensory deficit  Motor: No weakness or abnormal muscle tone        Coordination: Coordination normal          Vital Signs  ED Triage Vitals [03/01/23 0831]   Temperature Pulse Respirations Blood Pressure SpO2   98 3 °F (36 8 °C) 68 17 (!) 193/100 96 %      Temp Source Heart Rate Source Patient Position - Orthostatic VS BP Location FiO2 (%)   Tympanic Monitor Sitting Left arm --      Pain Score       4           Vitals:    03/01/23 0831 03/01/23 0900 03/01/23 0931   BP: (!) 193/100 160/90 (!) 175/74   Pulse: 68 69 80   Patient Position - Orthostatic VS: Sitting Sitting Sitting         Visual Acuity      ED Medications  Medications   ibuprofen (MOTRIN) tablet 600 mg (600 mg Oral Not Given 3/1/23 0939)   acetaminophen (TYLENOL) tablet 975 mg (975 mg Oral Given 3/1/23 0941)       Diagnostic Studies  Results Reviewed     None                 XR foot 3+ views RIGHT   ED Interpretation by Verle Bumpers, DO (03/01 0915)   No acute osseous abnormality        XR ankle 3+ views RIGHT   ED Interpretation by Verle Bumpers, DO (03/01 0915)   Non displaced, spiral fracture of the distal fibula                 Procedures  Splint application    Date/Time: 3/1/2023 9:42 AM  Performed by: Verle Bumpers, DO  Authorized by: Verle Bumpers, DO   Universal Protocol:  Consent: Verbal consent obtained  Consent given by: patient  Patient identity confirmed: verbally with patient      Pre-procedure details:     Sensation:  Normal  Procedure details:     Laterality:  Right    Location:  Ankle    Ankle:  R ankle    Strapping: no      Splint type:  Sugar tong and short leg    Supplies:  Cotton padding, Ortho-Glass and elastic bandage  Post-procedure details:     Pain:  Unchanged    Sensation:  Normal    Patient tolerance of procedure: Tolerated well, no immediate complications             ED Course                               SBIRT 20yo+    Flowsheet Row Most Recent Value   SBIRT (25 yo +)    In order to provide better care to our patients, we are screening all of our patients for alcohol and drug use  Would it be okay to ask you these screening questions? Yes Filed at: 03/01/2023 95   Initial Alcohol Screen: US AUDIT-C     1  How often do you have a drink containing alcohol? 0 Filed at: 03/01/2023 0837   2   How many drinks containing alcohol do you have on a typical day you are drinking? 0 Filed at: 03/01/2023 0837   3a  Male UNDER 65: How often do you have five or more drinks on one occasion? 0 Filed at: 03/01/2023 0837   3b  FEMALE Any Age, or MALE 65+: How often do you have 4 or more drinks on one occassion? 0 Filed at: 03/01/2023 0837   Audit-C Score 0 Filed at: 03/01/2023 5164   GIANNA: How many times in the past year have you    Used an illegal drug or used a prescription medication for non-medical reasons? Never Filed at: 03/01/2023 8306                    Medical Decision Making  60-year-old female presenting for right ankle injury  Slipped on the ice getting out of car  Did not hit head or lose conscious  Has mild swelling and tenderness to the lateral malleolus  Foot neurovascular intact  Obtain x-ray of right foot and ankle to rule out fracture  Patient declined pain medicine at this time  Amount and/or Complexity of Data Reviewed  Radiology: ordered  Disposition  Final diagnoses:   Fibula fracture     Time reflects when diagnosis was documented in both MDM as applicable and the Disposition within this note     Time User Action Codes Description Comment    3/1/2023  9:16 AM Sharan Curling Add [F23 409A] Fibula fracture       ED Disposition     ED Disposition   Discharge    Condition   Stable    Date/Time   Wed Mar 1, 2023  9:16 AM    Comment   Reyna Centeno discharge to home/self care                 Follow-up Information     Follow up With Specialties Details Why Contact Info Additional Information    [address] [facility] Schedule an appointment as soon as possible for a visit  For follow up of fibular fracture [address] [id number]  [address] [place] [address] [facility], [address] [place], [place], [place] [address]          Discharge Medication List as of 3/1/2023  9:17 AM      CONTINUE these medications which have NOT CHANGED    Details   bimatoprost (LUMIGAN) 0 01 % ophthalmic drops Administer 1 drop to both eyes daily at bedtime, Historical Med      Blood Pressure Monitoring (Blood Pressure Cuff) MISC Use daily, Starting u 3/24/2022, Normal      cetirizine (ZyrTEC) 10 mg tablet Take 10 mg by mouth as needed for allergies, Historical Med      losartan-hydrochlorothiazide (HYZAAR) 100-25 MG per tablet Take 1 tablet by mouth daily, Starting Mon 2/13/2023, Until Sun 5/14/2023, Normal      Omega-3 Fatty Acids (EQL FISH OIL PO) Take by mouth, Historical Med                 PDMP Review     None          ED Provider  Electronically Signed by           Mohan Ibarra DO  03/01/23 0908

## 2023-03-01 NOTE — Clinical Note
Sayda Mendez was seen and treated in our emergency department on 3/1/2023  Do not weight bare on right lower extremity until cleared by orthopedic surgery    Diagnosis: fibular fracture    Lencho Londono    She may return on this date: 03/02/2023         If you have any questions or concerns, please don't hesitate to call        Sarwat Briones, DO    ______________________________           _______________          _______________  Hospital Representative                              Date                                Time

## 2023-03-03 ENCOUNTER — OFFICE VISIT (OUTPATIENT)
Dept: OBGYN CLINIC | Facility: CLINIC | Age: 58
End: 2023-03-03

## 2023-03-03 ENCOUNTER — APPOINTMENT (OUTPATIENT)
Dept: RADIOLOGY | Facility: MEDICAL CENTER | Age: 58
End: 2023-03-03

## 2023-03-03 ENCOUNTER — APPOINTMENT (OUTPATIENT)
Dept: LAB | Facility: MEDICAL CENTER | Age: 58
End: 2023-03-03

## 2023-03-03 VITALS
BODY MASS INDEX: 36.19 KG/M2 | HEIGHT: 64 IN | HEART RATE: 84 BPM | WEIGHT: 212 LBS | SYSTOLIC BLOOD PRESSURE: 159 MMHG | DIASTOLIC BLOOD PRESSURE: 88 MMHG

## 2023-03-03 DIAGNOSIS — Z01.818 PRE-OP EXAM: ICD-10-CM

## 2023-03-03 DIAGNOSIS — S82.409A FIBULA FRACTURE: ICD-10-CM

## 2023-03-03 DIAGNOSIS — Z01.818 PRE-OP EXAM: Primary | ICD-10-CM

## 2023-03-03 LAB
APTT PPP: 30 SECONDS (ref 23–37)
BASOPHILS # BLD AUTO: 0.03 THOUSANDS/ÂΜL (ref 0–0.1)
BASOPHILS NFR BLD AUTO: 0 % (ref 0–1)
EOSINOPHIL # BLD AUTO: 0.07 THOUSAND/ÂΜL (ref 0–0.61)
EOSINOPHIL NFR BLD AUTO: 1 % (ref 0–6)
ERYTHROCYTE [DISTWIDTH] IN BLOOD BY AUTOMATED COUNT: 12 % (ref 11.6–15.1)
HCT VFR BLD AUTO: 45 % (ref 34.8–46.1)
HGB BLD-MCNC: 14.5 G/DL (ref 11.5–15.4)
IMM GRANULOCYTES # BLD AUTO: 0.02 THOUSAND/UL (ref 0–0.2)
IMM GRANULOCYTES NFR BLD AUTO: 0 % (ref 0–2)
INR PPP: 0.92 (ref 0.84–1.19)
LYMPHOCYTES # BLD AUTO: 2.48 THOUSANDS/ÂΜL (ref 0.6–4.47)
LYMPHOCYTES NFR BLD AUTO: 35 % (ref 14–44)
MCH RBC QN AUTO: 30.6 PG (ref 26.8–34.3)
MCHC RBC AUTO-ENTMCNC: 32.2 G/DL (ref 31.4–37.4)
MCV RBC AUTO: 95 FL (ref 82–98)
MONOCYTES # BLD AUTO: 0.54 THOUSAND/ÂΜL (ref 0.17–1.22)
MONOCYTES NFR BLD AUTO: 8 % (ref 4–12)
NEUTROPHILS # BLD AUTO: 3.89 THOUSANDS/ÂΜL (ref 1.85–7.62)
NEUTS SEG NFR BLD AUTO: 56 % (ref 43–75)
NRBC BLD AUTO-RTO: 0 /100 WBCS
PLATELET # BLD AUTO: 349 THOUSANDS/UL (ref 149–390)
PMV BLD AUTO: 9.4 FL (ref 8.9–12.7)
PROTHROMBIN TIME: 12.6 SECONDS (ref 11.6–14.5)
RBC # BLD AUTO: 4.74 MILLION/UL (ref 3.81–5.12)
WBC # BLD AUTO: 7.03 THOUSAND/UL (ref 4.31–10.16)

## 2023-03-03 RX ORDER — CHLORHEXIDINE GLUCONATE 4 G/100ML
SOLUTION TOPICAL DAILY PRN
OUTPATIENT
Start: 2023-03-13

## 2023-03-03 RX ORDER — BIMATOPROST 0.3 MG/ML
SOLUTION/ DROPS OPHTHALMIC
COMMUNITY
Start: 2023-01-05 | End: 2023-03-09

## 2023-03-03 RX ORDER — CEFAZOLIN SODIUM 2 G/50ML
2000 SOLUTION INTRAVENOUS ONCE
OUTPATIENT
Start: 2023-03-13 | End: 2023-03-03

## 2023-03-03 RX ORDER — TIMOLOL MALEATE 5 MG/ML
SOLUTION/ DROPS OPHTHALMIC
COMMUNITY
Start: 2023-02-02

## 2023-03-03 NOTE — PROGRESS NOTES
Assessment:   Diagnosis ICD-10-CM Associated Orders   1  Fibula fracture  S82 [id number] Ambulatory Referral to Orthopedic Surgery     XR ankle 3+ vw right          Plan:  Reviewed today's physical exam findings and x-ray findings with patient at time of visit  X-Ray of right ankle taken on 3/1/2023 were reviewed and showed Distal fibula minimally displaced oblique fracture  Medial and posterior malleoli tips tiny avulsion fractures  Stress x-rays took in office demonstrating medial clear space widening, creating a bimalleolar equivalent which is an indication for surgery    Risks and benefits of the treatment options and operative treatment were discussed in detail with the patient by Dr Katalina Holloway  The risks of ORIF of the right ankle including infection, bleeding, injury to nerves, injury to the vessels, excess scar tissue formation, risk of failure of the procedure, the possible need for further surgery, and potential risk of loss of limb and life  After weighing all the treatment options available, the patient has opted for surgical intervention and informed consent was obtained  We will schedule the patient to be seen back postoperatively  Patient expresses understanding and is in agreement with this treatment plan  The patient was given the opportunity to ask questions or present concerns  Pre-op instructions  Medications:  Hold anticoagulation therapy 5-7 days prior to surgery date  Hold vitamins/minerals/supplements/ NSAIDs 7 days prior to surgery to decrease bleeding risk  Hold diabetic medications morning of surgery  Hold Ace/Arbs/CCB day of surgery  OK to take rest of your medications morning of surgery with small sip of water including pain medication  NPO night before surgery at midnight  Advised to discuss this with their prescribers as well  To do next visit:  Return for surgery  The above stated was discussed in layman's terms and the patient expressed understanding    All questions were answered to the patient's satisfaction  Scribe Attestation    I,:  Kathia Wadsworth am acting as a scribe while in the presence of the attending physician :       I,:  Meredith Felipe DO personally performed the services described in this documentation    as scribed in my presence :         Subjective:   Aleida Merritt is a 65 y o  female who presents today for an Initial evaluation of her right ankle due to acute pain  Patient was last seen on 3/1/2023 in the ED at which time x-rays were taken, AO splint was applied and patient was instructed to follow-up with orthopedic surgery  On today's presentation, The patient states that she was cleaning off her car and slipped on ice getting out of her vehicle on 3/1/2023  She notes mild swelling, ecchymosis, and lateral ankle pain  Today she is unable to bear weight since the date of injury  She denies any numbness or tingling  Patients presents today using a Crutches for ambulatory assistance  She denies any fevers, chills, dizziness, headaches, chest pain, shortness of breath, palpitations, abdominal pain, nausea, vomiting, diarrhea  She describes lower extremity pain/swelling/edema, which comes with keeping it down and       Review of systems negative unless otherwise specified in HPI  Review of Systems   Constitutional: Negative for chills and fever  HENT: Negative for ear pain and sore throat  Eyes: Negative for pain and visual disturbance  Respiratory: Negative for cough and shortness of breath  Cardiovascular: Negative for chest pain and palpitations  Gastrointestinal: Negative for abdominal pain and vomiting  Genitourinary: Negative for dysuria and hematuria  Musculoskeletal: Negative for arthralgias and back pain  Skin: Negative for color change and rash  Neurological: Negative for seizures and syncope  All other systems reviewed and are negative        Past Medical History:   Diagnosis Date   • Abnormal uterine bleeding    • Class 1 obesity due to excess calories in adult 12/09/2020   • Contact lens overwear of both eyes    • Glaucoma     borderline recently started on lumigan    • Hypertension    • Motion sickness    • Seasonal allergies    • Uterine fibroid    • Wears glasses        Past Surgical History:   Procedure Laterality Date   • BREAST BIOPSY Right 15yrs benign   • CYSTOSCOPY N/A 12/9/2020    Procedure: CYSTOSCOPY;  Surgeon: Israel Acevedo MD;  [facility];  Service: Gynecology   • HYSTERECTOMY     • AR COLONOSCOPY FLX DX W/COLLJ SPEC WHEN PFRMD N/A 10/9/2018    Procedure: COLONOSCOPY;  Surgeon: Jane Bloch, MD;  [facility];  Service: Gastroenterology   • AR LAPAROSCOPY W TOTAL HYSTERECTOMY UTERUS 250 GM/< N/A 12/9/2020    Procedure: LAP TOTAL HYSTERECTOMY;  Surgeon: Israel Acevedo MD;  [facility];  Service: Gynecology   • AR LAPAROSCOPY W/RMVL ADNEXAL STRUCTURES Bilateral 12/9/2020    Procedure: SALPINGECTOMY;  Surgeon: Israel Acevedo MD;  [facility];  Service: Gynecology   • TUBAL LIGATION     • VAGINAL DELIVERY      x3       Family History   Adopted: Yes   Problem Relation Age of Onset   • Hypothyroidism Daughter    • Irritable bowel syndrome Daughter    • Irritable bowel syndrome Son        Social History     Occupational History     Comment: manager for work study program   Tobacco Use   • Smoking status: Never   • Smokeless tobacco: Never   Vaping Use   • Vaping Use: Never used   Substance and Sexual Activity   • Alcohol use:  Yes     Alcohol/week: 0 0 standard drinks     Comment: 1 or 2 alcoholic  beverages on holidays   • Drug use: No   • Sexual activity: Not Currently     Partners: Male         Current Outpatient Medications:   •  bimatoprost (LUMIGAN) 0 01 % ophthalmic drops, Administer 1 drop to both eyes daily at bedtime, Disp: , Rfl:   •  Blood Pressure Monitoring (Blood Pressure Cuff) MISC, Use daily, Disp: 1 each, Rfl: 0  •  cetirizine (ZyrTEC) 10 mg tablet, Take 10 mg by mouth as needed for allergies, Disp: , Rfl:   •  losartan-hydrochlorothiazide (HYZAAR) 100-25 MG per tablet, Take 1 tablet by mouth daily, Disp: 90 tablet, Rfl: 0  •  timolol (TIMOPTIC) 0 5 % ophthalmic solution, INSTILL 1 DROP INTO EACH EYE IN THE MORNING, Disp: , Rfl:   •  bimatoprost (LUMIGAN) 0 03 % ophthalmic drops, instill 1 drop INTO AFFECTED EYE(S) every evening (Patient not taking: Reported on 3/3/2023), Disp: , Rfl:   •  Omega-3 Fatty Acids (EQL FISH OIL PO), Take by mouth (Patient not taking: Reported on 3/3/2023), Disp: , Rfl:     No Known Allergies       Vitals:    03/03/23 0815   BP: 159/88   Pulse: 84       Objective:                    Ortho Exam  right ankle -   Patient ambulates with antalgic gait pattern  Uses Crutches assistive device  No anatomical deformity  Skin is warm and dry to touch with no signs of erythema, ecchymosis, infection  Mild generalized soft tissue swelling or effusion noted  ROM reduced  TTP over lateral ankle  MMT: deferred  Calf compartments are soft and supple  2+ DP pulses with brisk capillary refill to the toes  Sural, saphenous, tibial, superficial, and deep peroneal motor and sensory distributions intact  Sensation to light touch intact distally    Diagnostics, reviewed and taken today if performed as documented: The attending physician has personally reviewed the pertinent films in PACS and interpretation is as follows:    X-Ray of right ankle taken on 3/1/2023 were reviewed and showed Distal fibula minimally displaced oblique fracture  Medial and posterior malleoli tips tiny avulsion fractures  Stress views taken demonstrating medial clear space widening, bimalleolar equivalent    Procedures, if performed today:    None performed    Portions of the record may have been created with voice recognition software    Occasional wrong word or "sound a like" substitutions may have occurred due to the inherent limitations of voice recognition software  Read the chart carefully and recognize, using context, where substitutions have occurred

## 2023-03-09 ENCOUNTER — TELEPHONE (OUTPATIENT)
Dept: OBGYN CLINIC | Facility: HOSPITAL | Age: 58
End: 2023-03-09

## 2023-03-09 NOTE — PRE-PROCEDURE INSTRUCTIONS
Pre-Surgery Instructions:   Medication Instructions   • bimatoprost (LUMIGAN) 0 01 % ophthalmic drops Take night before surgery   • losartan-hydrochlorothiazide (HYZAAR) 100-25 MG per tablet Hold day of surgery  • timolol (TIMOPTIC) 0 5 % ophthalmic solution Take day of surgery  Pt denies fever, sob, sore throat and cough  Pt verbalized understanding of shower and med instructions  Pt instructed to stop nsaids and supplements one week prior to surgery

## 2023-03-10 ENCOUNTER — TELEPHONE (OUTPATIENT)
Dept: OBGYN CLINIC | Facility: CLINIC | Age: 58
End: 2023-03-10

## 2023-03-10 ENCOUNTER — ANESTHESIA EVENT (OUTPATIENT)
Dept: PERIOP | Facility: HOSPITAL | Age: 58
End: 2023-03-10

## 2023-03-10 ENCOUNTER — TELEPHONE (OUTPATIENT)
Dept: OBGYN CLINIC | Facility: HOSPITAL | Age: 58
End: 2023-03-10

## 2023-03-10 NOTE — TELEPHONE ENCOUNTER
Spoke to patient   She will call W/C and make sure we speak to Elliot Alvarez today  She does not want it to go through her regular insurance

## 2023-03-10 NOTE — TELEPHONE ENCOUNTER
Caller: CHANTELLE    Doctor: Jeb Mckeon    Reason for call: Kae Laoby requested last office notes to be faxed over to [phone]  Document was electronically sent over      Call back#: [phone]

## 2023-03-11 NOTE — ANESTHESIA PREPROCEDURE EVALUATION
Procedure:  OPEN REDUCTION W/ INTERNAL FIXATION (ORIF) ANKLE (Right: Ankle)    Distal Fib and medial malleolar fractures    Prior GA MAC 3 G2a  ECG: NSR    HTN - losartan HCTZ - last took yesterday am  Obese Class II    Denies the following: CP/SOB with exertion, asthma, COPD, ROSALIND, stroke/TIA, seizure    Relevant Problems   CARDIO   (+) Primary hypertension      GYN   (+) S/P laparoscopic hysterectomy        Physical Exam    Airway    Mallampati score: II  TM Distance: >3 FB  Neck ROM: full     Dental   No notable dental hx     Cardiovascular      Pulmonary      Other Findings        Anesthesia Plan  ASA Score- 2     Anesthesia Type- general with ASA Monitors  Additional Monitors:   Airway Plan: LMA  Comment: Preop nerve block adductor and popliteal         Plan Factors-Exercise tolerance (METS): >4 METS  Chart reviewed  Existing labs reviewed  Patient summary reviewed  Patient is not a current smoker  Obstructive sleep apnea risk education given perioperatively  Induction-     Postoperative Plan-     Informed Consent- Anesthetic plan and risks discussed with patient  I personally reviewed this patient with the CRNA  Discussed and agreed on the Anesthesia Plan with the CRNA  Nguyen Sherman

## 2023-03-13 ENCOUNTER — APPOINTMENT (OUTPATIENT)
Dept: RADIOLOGY | Facility: HOSPITAL | Age: 58
End: 2023-03-13

## 2023-03-13 ENCOUNTER — HOSPITAL ENCOUNTER (OUTPATIENT)
Facility: HOSPITAL | Age: 58
Setting detail: OUTPATIENT SURGERY
Discharge: HOME | End: 2023-03-13
Attending: STUDENT IN AN ORGANIZED HEALTH CARE EDUCATION/TRAINING PROGRAM | Admitting: STUDENT IN AN ORGANIZED HEALTH CARE EDUCATION/TRAINING PROGRAM

## 2023-03-13 ENCOUNTER — ANESTHESIA (OUTPATIENT)
Dept: PERIOP | Facility: HOSPITAL | Age: 58
End: 2023-03-13

## 2023-03-13 VITALS
HEIGHT: 64 IN | BODY MASS INDEX: 36.19 KG/M2 | DIASTOLIC BLOOD PRESSURE: 72 MMHG | WEIGHT: 212 LBS | SYSTOLIC BLOOD PRESSURE: 131 MMHG | RESPIRATION RATE: 20 BRPM | OXYGEN SATURATION: 96 % | HEART RATE: 83 BPM | TEMPERATURE: 96.8 F

## 2023-03-13 DIAGNOSIS — Z47.89 AFTERCARE FOLLOWING SURGERY OF THE MUSCULOSKELETAL SYSTEM: Primary | ICD-10-CM

## 2023-03-13 DEVICE — 2.7MM METAPHYSEAL SCR SLF-TPNG W/T8 STRDRV RECESS/16MM: Type: IMPLANTABLE DEVICE | Site: ANKLE | Status: FUNCTIONAL

## 2023-03-13 DEVICE — 2.7MM VA LCKNG SCREW SLF-TPNG WITH T8 STARDRIVE RECESS 14MM: Type: IMPLANTABLE DEVICE | Site: ANKLE | Status: FUNCTIONAL

## 2023-03-13 DEVICE — 2.7MM CORTEX SCREW SELF-TAPPING 30MM: Type: IMPLANTABLE DEVICE | Site: ANKLE | Status: FUNCTIONAL

## 2023-03-13 DEVICE — 2.7MM VA LCKNG SCREW SLF-TPNG WITH T8 STARDRIVE RECESS 16MM: Type: IMPLANTABLE DEVICE | Site: ANKLE | Status: FUNCTIONAL

## 2023-03-13 DEVICE — 2.7MM VA LCKNG SCREW SLF-TPNG WITH T8 STARDRIVE RECESS 10MM: Type: IMPLANTABLE DEVICE | Site: ANKLE | Status: FUNCTIONAL

## 2023-03-13 DEVICE — 2.7MM METAPHYSEAL SCR SLF-TPNG W/T8 STRDRV RECESS/14MM: Type: IMPLANTABLE DEVICE | Site: ANKLE | Status: FUNCTIONAL

## 2023-03-13 DEVICE — 2.7MM VA-LCP LATERAL DISTAL FIBULA PLATE/4 HOLES/RIGHT
Type: IMPLANTABLE DEVICE | Site: ANKLE | Status: FUNCTIONAL
Brand: VA-LCP

## 2023-03-13 DEVICE — 2.7MM VA LCKNG SCREW SLF-TPNG WITH T8 STARDRIVE RECESS 18MM: Type: IMPLANTABLE DEVICE | Site: ANKLE | Status: FUNCTIONAL

## 2023-03-13 RX ORDER — OXYCODONE HYDROCHLORIDE 5 MG/1
5 TABLET ORAL EVERY 6 HOURS PRN
Qty: 5 TABLET | Refills: 0 | Status: CANCELLED | OUTPATIENT
Start: 2023-03-13 | End: 2023-03-23

## 2023-03-13 RX ORDER — TRAMADOL HYDROCHLORIDE 50 MG/1
50 TABLET ORAL EVERY 6 HOURS PRN
Qty: 40 TABLET | Refills: 0 | Status: SHIPPED | OUTPATIENT
Start: 2023-03-13 | End: 2023-03-23

## 2023-03-13 RX ORDER — HYDROMORPHONE HCL/PF 1 MG/ML
SYRINGE (ML) INJECTION AS NEEDED
Status: DISCONTINUED | OUTPATIENT
Start: 2023-03-13 | End: 2023-03-13

## 2023-03-13 RX ORDER — LIDOCAINE HYDROCHLORIDE 10 MG/ML
INJECTION, SOLUTION EPIDURAL; INFILTRATION; INTRACAUDAL; PERINEURAL AS NEEDED
Status: DISCONTINUED | OUTPATIENT
Start: 2023-03-13 | End: 2023-03-13

## 2023-03-13 RX ORDER — CHLORHEXIDINE GLUCONATE 4 G/100ML
SOLUTION TOPICAL DAILY PRN
Status: DISCONTINUED | OUTPATIENT
Start: 2023-03-13 | End: 2023-03-13 | Stop reason: HOSPADM

## 2023-03-13 RX ORDER — ONDANSETRON 2 MG/ML
4 INJECTION INTRAMUSCULAR; INTRAVENOUS ONCE AS NEEDED
Status: DISCONTINUED | OUTPATIENT
Start: 2023-03-13 | End: 2023-03-13 | Stop reason: HOSPADM

## 2023-03-13 RX ORDER — ALBUTEROL SULFATE 2.5 MG/3ML
2.5 SOLUTION RESPIRATORY (INHALATION) ONCE AS NEEDED
Status: DISCONTINUED | OUTPATIENT
Start: 2023-03-13 | End: 2023-03-13 | Stop reason: HOSPADM

## 2023-03-13 RX ORDER — PROPOFOL 10 MG/ML
INJECTION, EMULSION INTRAVENOUS CONTINUOUS PRN
Status: DISCONTINUED | OUTPATIENT
Start: 2023-03-13 | End: 2023-03-13

## 2023-03-13 RX ORDER — ROPIVACAINE HYDROCHLORIDE 2 MG/ML
INJECTION, SOLUTION EPIDURAL; INFILTRATION; PERINEURAL
Status: COMPLETED | OUTPATIENT
Start: 2023-03-13 | End: 2023-03-13

## 2023-03-13 RX ORDER — SENNOSIDES 8.6 MG
650 CAPSULE ORAL EVERY 8 HOURS
Qty: 15 TABLET | Refills: 0 | Status: SHIPPED | OUTPATIENT
Start: 2023-03-13 | End: 2023-03-18

## 2023-03-13 RX ORDER — CEFAZOLIN SODIUM 2 G/50ML
2000 SOLUTION INTRAVENOUS ONCE
Status: COMPLETED | OUTPATIENT
Start: 2023-03-13 | End: 2023-03-13

## 2023-03-13 RX ORDER — MIDAZOLAM HYDROCHLORIDE 2 MG/2ML
INJECTION, SOLUTION INTRAMUSCULAR; INTRAVENOUS AS NEEDED
Status: DISCONTINUED | OUTPATIENT
Start: 2023-03-13 | End: 2023-03-13

## 2023-03-13 RX ORDER — MAGNESIUM HYDROXIDE 1200 MG/15ML
LIQUID ORAL AS NEEDED
Status: DISCONTINUED | OUTPATIENT
Start: 2023-03-13 | End: 2023-03-13 | Stop reason: HOSPADM

## 2023-03-13 RX ORDER — DEXAMETHASONE SODIUM PHOSPHATE 10 MG/ML
INJECTION, SOLUTION INTRAMUSCULAR; INTRAVENOUS AS NEEDED
Status: DISCONTINUED | OUTPATIENT
Start: 2023-03-13 | End: 2023-03-13

## 2023-03-13 RX ORDER — NAPROXEN 500 MG/1
500 TABLET ORAL 2 TIMES DAILY WITH MEALS
Qty: 10 TABLET | Refills: 0 | Status: SHIPPED | OUTPATIENT
Start: 2023-03-13 | End: 2023-03-24

## 2023-03-13 RX ORDER — SODIUM CHLORIDE, SODIUM LACTATE, POTASSIUM CHLORIDE, CALCIUM CHLORIDE 600; 310; 30; 20 MG/100ML; MG/100ML; MG/100ML; MG/100ML
INJECTION, SOLUTION INTRAVENOUS CONTINUOUS PRN
Status: DISCONTINUED | OUTPATIENT
Start: 2023-03-13 | End: 2023-03-13

## 2023-03-13 RX ORDER — ASPIRIN 81 MG/1
81 TABLET ORAL 2 TIMES DAILY
Qty: 28 TABLET | Refills: 0 | Status: SHIPPED | OUTPATIENT
Start: 2023-03-13 | End: 2023-03-27

## 2023-03-13 RX ORDER — ONDANSETRON 2 MG/ML
INJECTION INTRAMUSCULAR; INTRAVENOUS AS NEEDED
Status: DISCONTINUED | OUTPATIENT
Start: 2023-03-13 | End: 2023-03-13

## 2023-03-13 RX ORDER — ONDANSETRON 4 MG/1
4 TABLET, FILM COATED ORAL EVERY 8 HOURS PRN
Qty: 20 TABLET | Refills: 0 | Status: SHIPPED | OUTPATIENT
Start: 2023-03-13

## 2023-03-13 RX ORDER — PROPOFOL 10 MG/ML
INJECTION, EMULSION INTRAVENOUS AS NEEDED
Status: DISCONTINUED | OUTPATIENT
Start: 2023-03-13 | End: 2023-03-13

## 2023-03-13 RX ORDER — FENTANYL CITRATE/PF 50 MCG/ML
25 SYRINGE (ML) INJECTION
Status: DISCONTINUED | OUTPATIENT
Start: 2023-03-13 | End: 2023-03-13 | Stop reason: HOSPADM

## 2023-03-13 RX ORDER — ONDANSETRON 2 MG/ML
4 INJECTION INTRAMUSCULAR; INTRAVENOUS EVERY 6 HOURS PRN
Status: DISCONTINUED | OUTPATIENT
Start: 2023-03-13 | End: 2023-03-13 | Stop reason: HOSPADM

## 2023-03-13 RX ORDER — FENTANYL CITRATE 50 UG/ML
INJECTION, SOLUTION INTRAMUSCULAR; INTRAVENOUS AS NEEDED
Status: DISCONTINUED | OUTPATIENT
Start: 2023-03-13 | End: 2023-03-13

## 2023-03-13 RX ORDER — ACETAMINOPHEN 325 MG/1
650 TABLET ORAL EVERY 6 HOURS PRN
Status: DISCONTINUED | OUTPATIENT
Start: 2023-03-13 | End: 2023-03-13 | Stop reason: HOSPADM

## 2023-03-13 RX ORDER — HYDROMORPHONE HCL/PF 1 MG/ML
0.5 SYRINGE (ML) INJECTION
Status: DISCONTINUED | OUTPATIENT
Start: 2023-03-13 | End: 2023-03-13 | Stop reason: HOSPADM

## 2023-03-13 RX ADMIN — ROPIVACAINE HYDROCHLORIDE 5 ML: 2 INJECTION, SOLUTION EPIDURAL; INFILTRATION; PERINEURAL at 07:40

## 2023-03-13 RX ADMIN — PROPOFOL 200 MG: 10 INJECTION, EMULSION INTRAVENOUS at 07:51

## 2023-03-13 RX ADMIN — HYDROMORPHONE HYDROCHLORIDE 0.5 MG: 1 INJECTION, SOLUTION INTRAMUSCULAR; INTRAVENOUS; SUBCUTANEOUS at 08:22

## 2023-03-13 RX ADMIN — MIDAZOLAM HYDROCHLORIDE 2 MG: 1 INJECTION, SOLUTION INTRAMUSCULAR; INTRAVENOUS at 07:35

## 2023-03-13 RX ADMIN — PROPOFOL 50 MG: 10 INJECTION, EMULSION INTRAVENOUS at 08:18

## 2023-03-13 RX ADMIN — FENTANYL CITRATE 25 MCG: 50 INJECTION, SOLUTION INTRAMUSCULAR; INTRAVENOUS at 07:56

## 2023-03-13 RX ADMIN — LIDOCAINE HYDROCHLORIDE 50 MG: 10 INJECTION, SOLUTION EPIDURAL; INFILTRATION; INTRACAUDAL; PERINEURAL at 07:51

## 2023-03-13 RX ADMIN — FENTANYL CITRATE 25 MCG: 50 INJECTION, SOLUTION INTRAMUSCULAR; INTRAVENOUS at 08:01

## 2023-03-13 RX ADMIN — DEXAMETHASONE SODIUM PHOSPHATE 5 MG: 10 INJECTION, SOLUTION INTRAMUSCULAR; INTRAVENOUS at 07:51

## 2023-03-13 RX ADMIN — ROPIVACAINE HYDROCHLORIDE 5 ML: 2 INJECTION, SOLUTION EPIDURAL; INFILTRATION at 07:40

## 2023-03-13 RX ADMIN — BUPIVACAINE 20 ML: 13.3 INJECTION, SUSPENSION, LIPOSOMAL INFILTRATION at 07:40

## 2023-03-13 RX ADMIN — CEFAZOLIN SODIUM 2000 MG: 2 SOLUTION INTRAVENOUS at 07:45

## 2023-03-13 RX ADMIN — SODIUM CHLORIDE, SODIUM LACTATE, POTASSIUM CHLORIDE, AND CALCIUM CHLORIDE: .6; .31; .03; .02 INJECTION, SOLUTION INTRAVENOUS at 09:13

## 2023-03-13 RX ADMIN — SODIUM CHLORIDE, SODIUM LACTATE, POTASSIUM CHLORIDE, AND CALCIUM CHLORIDE: .6; .31; .03; .02 INJECTION, SOLUTION INTRAVENOUS at 07:25

## 2023-03-13 RX ADMIN — HYDROMORPHONE HYDROCHLORIDE 0.5 MG: 1 INJECTION, SOLUTION INTRAMUSCULAR; INTRAVENOUS; SUBCUTANEOUS at 08:18

## 2023-03-13 RX ADMIN — FENTANYL CITRATE 50 MCG: 50 INJECTION, SOLUTION INTRAMUSCULAR; INTRAVENOUS at 08:05

## 2023-03-13 RX ADMIN — ONDANSETRON 4 MG: 2 INJECTION INTRAMUSCULAR; INTRAVENOUS at 08:00

## 2023-03-13 RX ADMIN — PROPOFOL 100 MCG/KG/MIN: 10 INJECTION, EMULSION INTRAVENOUS at 07:51

## 2023-03-13 NOTE — ANESTHESIA POSTPROCEDURE EVALUATION
Post-Op Assessment Note    CV Status:  Stable  Pain Score: 0    Pain management: adequate     Mental Status:  Alert and awake   Hydration Status:  Euvolemic   PONV Controlled:  Controlled   Airway Patency:  Patent   Two or more mitigation strategies used for obstructive sleep apnea   Post Op Vitals Reviewed: Yes      Staff: Anesthesiologist, with CRNAs         No notable events documented      BP   133/75   Temp   98 0   Pulse  84   Resp 16   SpO2 100

## 2023-03-13 NOTE — ANESTHESIA PROCEDURE NOTES
Peripheral Block    Patient location during procedure: holding area  Start time: 3/13/2023 7:40 AM  Reason for block: at surgeon's request and post-op pain management  Staffing  Performed: Anesthesiologist   Preanesthetic Checklist  Completed: patient identified, IV checked, site marked, risks and benefits discussed, surgical consent, monitors and equipment checked, pre-op evaluation and timeout performed  Peripheral Block  Patient position: supine  Prep: ChloraPrep  Patient monitoring: continuous pulse ox and frequent blood pressure checks  Block type: popliteal  Laterality: right  Injection technique: single-shot  Procedures: ultrasound guided, Ultrasound guidance required for the procedure to increase accuracy and safety of medication placement and decrease risk of complications    Ultrasound permanent image savedropivacaine (NAROPIN) 0 2% - Perineural   5 mL - 3/13/2023 7:40:00 AM (exparel 1 3% 10 ml, saline 5 ml)  Needle  Needle type: Stimuplex   Needle gauge: 22 G  Needle length: 5 cm  Needle localization: ultrasound guidance and anatomical landmarks  Test dose: negative  Assessment  Injection assessment: incremental injection, local visualized surrounding nerve on ultrasound, negative aspiration for CSF, transient paresthesias and negative aspiration for heme  Paresthesia pain: immediately resolved  Heart rate change: no  Slow fractionated injection: yes  Post-procedure:  secured with tape  patient tolerated the procedure well with no immediate complications

## 2023-03-13 NOTE — ANESTHESIA PROCEDURE NOTES
Peripheral Block    Patient location during procedure: holding area  Start time: 3/13/2023 7:40 AM  Reason for block: at surgeon's request and post-op pain management  Staffing  Performed: Anesthesiologist   Preanesthetic Checklist  Completed: patient identified, IV checked, site marked, risks and benefits discussed, surgical consent, monitors and equipment checked, pre-op evaluation and timeout performed  Peripheral Block  Patient position: supine  Prep: ChloraPrep  Patient monitoring: continuous pulse ox and frequent blood pressure checks  Block type: adductor canal block  Laterality: right  Injection technique: single-shot  Procedures: ultrasound guided, Ultrasound guidance required for the procedure to increase accuracy and safety of medication placement and decrease risk of complications    Ultrasound permanent image savedropivacaine (NAROPIN) 0 2% - Perineural   5 mL - 3/13/2023 7:40:00 AM (exparell 1 3% 10 ml)  Needle  Needle type: Stimuplex   Needle gauge: 22 G  Needle length: 5 cm  Needle localization: anatomical landmarks and ultrasound guidance  Test dose: negative  Assessment  Injection assessment: incremental injection, local visualized surrounding nerve on ultrasound, negative aspiration for CSF, negative aspiration for heme and transient paresthesias  Paresthesia pain: immediately resolved  Heart rate change: no  Slow fractionated injection: yes  Post-procedure:  site cleaned  patient tolerated the procedure well with no immediate complications

## 2023-03-13 NOTE — OP NOTE
OPERATIVE REPORT  PATIENT NAME: Mayo العراقي    :  1965  MRN: [id number]  Pt Location: [facility] ROOM 01    SURGERY DATE: 3/13/2023    Surgeon(s) and Role:     * Aliza Loja DO - Primary     * Shantell Mayorga PA-C - Assisting    Preop Diagnosis:  Fibula fracture [S82 409A]  Right bimalleolar equivalent    Post-Op Diagnosis Codes:     * Fibula fracture [S82 409A]  Right bimalleolar equivalent    Procedure(s):  Right - OPEN REDUCTION W/ INTERNAL FIXATION (ORIF) ANKLE    Specimen(s):  * No specimens in log *    Estimated Blood Loss:   75 mL    Drains:  * No LDAs found *    Anesthesia Type:   Choice    Implant Name Type Inv   Item Serial No   Lot No  LRB No  Used Action   LOG [id number] - SYNTHES 2 7, AND 3 5MM LCP DISTAL FIBULA PLATES - 1           LOG [id number] - [employer] 4 0MM CANNULATED SCREW SET - 1           LOG [id number] - SYNTHES LCP SMALL FRAGMENT INSTRUMENT SET - 1           SCREW CRTX 2 7 X 30MM SLF TAP - [id number]  SCREW CRTX 2 7 X 30MM SLF TAP  Synthes  Right 1 Implanted   SCREW MTPHSL 2 7 X 20MM T8 STRDRV SLF TAP - [id number]  SCREW MTPHSL 2 7 X 20MM T8 STRDRV SLF TAP  Synthes  Right 1 Wasted   PLATE VA-LCP 6 4GQ DSTL LAT FIB 4HL/RT/92MM - [id number]  PLATE VA-LCP 2 1JH DSTL LAT FIB 4HL/RT/92MM  Synthes  Right 1 Implanted   SCREW MTPHSL 2 7 X 16MM T8 STRDRV SLF TAP - [id number]  SCREW MTPHSL 2 7 X 16MM T8 STRDRV SLF TAP  Synthes  Right 1 Implanted   SCREW MTPHSL 2 7 X 14MM T8 STRDRV SLF TAP - [id number]  SCREW MTPHSL 2 7 X 14MM T8 STRDRV SLF TAP  Synthes  Right 1 Implanted   K-WIRE FIX 1 25 X 150MM TROCAR PT - [id number]  K-WIRE FIX 1 25 X 150MM TROCAR PT  Synthes  Right 1 Implanted and Explanted   SCREW LCK VA 2 7 X 14MM T8 STRDRV RECES SLF TAP - [id number]  SCREW LCK VA 2 7 X 14MM T8 STRDRV RECES SLF TAP  Synthes  Right 2 Implanted   SCREW LCK VA 2 7 X 16MM T8 STRDRV RECES SLF TAP - [id number]  SCREW LCK VA 2 7 X 16MM T8 STRDRV RECES SLF TAP  Synthes  Right 1 Implanted   SCREW LCK VA 2 7 X 10MM T8 STRDRV RECES SLF TAP - [id number]  SCREW LCK VA 2 7 X 10MM T8 STRDRV RECES SLF TAP  Synthes  Right 1 Implanted   SCREW LCK VA 2 7 X 18MM T8 STRDRV RECES SLF TAP - [id number]  SCREW LCK VA 2 7 X 18MM T8 STRDRV RECES SLF TAP  Synthes  Right 1 Implanted         Operative Indications:  Fibula fracture [S82 409A]  Patient is a 52-year-old female who sustained a mechanical trip and fall twisting her ankle  She was taken the emergency room she was diagnosed with a distal fibular fracture with medial clear space widening, a bimalleolar equivalent and was indicated for surgical fixation  The patient has failed non operative measures and has opted for surgical intervention  Risks and benefits of the treatment options and surgery were discussed in detail with the patient by Dr Jasmine Fierro  The risks of surgery including infection, bleeding, injury to nerves, injury to the vessels, excess scar tissue formation, risk of failure of the procedure, the possible need for further surgery, and potential risk of loss of limb and life  After weighing all the treatment options available, the patient has opted for surgical intervention and informed consent was obtained  Operative Findings:  Right bimalleolar equivalent fracture    Complications:   None    Procedure and Technique:  Patient underwent a preoperative block by anesthesia  She was taken to the operating room where she was transferred operating room table  She was placed in the supine position with all bony prominences well-padded, a bump underneath the right hip and a large bump under the right lower extremity  She was intubated and sedated in the standard manner, perioperative antibiotics were given, Ancef  Right lower extremities prepped and draped in standard sterile fashion after placing a tourniquet around the thigh    Timeout was performed identifying all team members in the room and the right lower extremity the proper operative extremity    Right lower extremity was exsanguinated with an Esmarch tourniquet, tourniquet was inflated 300 mmHg  Approximately 8 cm incision was made over the lateral malleolus and distal fibula  Sharply incised down to fascia, fascia was opened up with a Metzenbaum scissor to avoid superficial peroneal nerve damage  Fracture site was identified sharply incised down to the fracture and distal fibula  Fracture site was opened after placing retractors, fracture site was debrided with rongeur, curettes, 15 blade knife  Fracture apices were identified proximally and distally, lobster-claw clamp was then placed and provisionally reduced in a near anatomic fashion  X-rays were taken demonstrating near anatomic reduction  Lag screw was then placed proximally to distally in the standard fashion  Clamp was removed images were once more taken demonstrating anatomic reduction with lag screw in place  Neutralization plate was then placed on the lateral fibula, this was drilled and filled in standard fashion  All screws were extra-articular  Cortical screws proximally, locking screws distally  Final images were then taken, the ankle was stressed with both a cotton test and a standard stress test demonstrating no talar tilt or syndesmotic widening, so no syndesmotic screws were placed  AP and lateral radiographs were taken demonstrating near anatomic reduction and well-placed plate  Wound was then copiously irrigated with normal saline, tourniquet was taken down, hemostasis was obtained  Deep tissue was closed with 2-0 Monocryl  Subcutaneous tissue was closed with 2-0 Monocryl  Skin was closed with nylon  Skin was cleansed and dried, Xeroform 4 x 4 soft roll AO splint and Ace were placed  Patient was awoken from anesthesia without complication sent to the PACU     I was present for the entire procedure, A qualified resident physician was not available and A physician assistant   [facility],  was required during the procedure for retraction tissue handling,dissection and suturing    Patient Disposition:  PACU  and extubated and stable        SIGNATURE: Shraddha Casiano DO  DATE: March 13, 2023  TIME: 9:16 AM

## 2023-03-13 NOTE — H&P
H&P Exam - Orthopedics   Reyna Centeno 65 y o  female MRN: [id number]  Unit/Bed#: APU 1    Assessment/Plan     Assessment:  65 y o  female with right ankle fibula fracture with medial joint space widening    Plan: We will proceed with open reduction internal fixation of right ankle    History of Present Illness   HPI:  Reyna Centeno is a 65 y o  female who presents with right ankle pain s/p fall  They have been NPO since midnight  As per previous note, they have failed all conservative measures and wish to proceed with surgery       Historical Information  Past Medical History:   Diagnosis Date   • Abnormal uterine bleeding    • Class 1 obesity due to excess calories in adult 12/09/2020   • Contact lens overwear of both eyes    • Glaucoma     borderline recently started on lumigan    • Hypertension    • Motion sickness    • Seasonal allergies    • Uterine fibroid    • Wears glasses      Past Surgical History:   Procedure Laterality Date   • BREAST BIOPSY Right 15yrs benign   • CYSTOSCOPY N/A 12/9/2020    Procedure: CYSTOSCOPY;  Surgeon: Jamee Brown MD;  Location: [facility];  Service: Gynecology   • HYSTERECTOMY     • UT COLONOSCOPY FLX DX W/COLLJ SPEC WHEN PFRMD N/A 10/9/2018    Procedure: COLONOSCOPY;  Surgeon: Tung Upton MD;  Location: [facility];  Service: Gastroenterology   • UT LAPAROSCOPY W TOTAL HYSTERECTOMY UTERUS 250 GM/< N/A 12/9/2020    Procedure: LAP TOTAL HYSTERECTOMY;  Surgeon: Jamee Brown MD;  Location: [facility];  Service: Gynecology   • UT LAPAROSCOPY W/RMVL ADNEXAL STRUCTURES Bilateral 12/9/2020    Procedure: SALPINGECTOMY;  Surgeon: Jamee Brown MD;  [facility]: [facility];  Service: Gynecology   • TUBAL LIGATION     • VAGINAL DELIVERY      x3        Physical Exam:  /89   Pulse 85   Temp 98 3 °F (36 8 °C) (Tympanic)   Resp 20   Ht 5' 4" (1 626 m)   Wt 96 2 kg (212 lb)   LMP 11/02/2020 (Exact Date)   SpO2 98%   BMI 36 39 kg/m²       Ortho Exam: Sensation remains intact to Right ankle  Mild swelling throughout foot  Toes warm and well perfused  AO splint intact     Neuro Exam: Motor intact +EHL/+FHL    Lab Results: Reviewed  Imaging: Reviewed    Mallorie Lynne PA-C

## 2023-03-13 NOTE — DISCHARGE INSTR - AVS FIRST PAGE
POSTOPERATIVE INSTRUCTIONS     MEDICATIONS:  Resume all home medications unless otherwise instructed by your surgeon  Pain Medication:  Tramadol, take as prescribed for breakthrough pain to around-the-clock tylenol and naproxen   If you were given a regional anesthetic (nerve block), please begin taking the pain medication as soon as you get home, even if you have minimal or no pain  DO NOT WAIT FOR THE NERVE BLOCK TO WEAR OFF  Possible side effects include nausea, constipation, and urinary retention  If you experience these side effects, please call our office for assistance  Pain med refills are authorized only during office hours (8am-4pm, Mon-Fri)  Anti-Inflammatory:  Naproxen 500 mg, 1 tablet every 12 hours for 4 weeks and Tylenol 650 mg, 1 tablet every 6 hours for 4 weeks  Take with food  Stop if you experience nausea, reflux, or stomach pain  Nausea Medication:  Zofran ODT 4 mg, 1 tablet every 6 hours as needed  Fill prescription ONLY if you expericnce severe nausea  Blood Clot Prevention:  Aspirin 81 mg, 1 tablet twice daily for 2 weeks  Pump your foot up and down 20 times per hour while you are less mobile  WOUND CARE:  Keep the dressing clean and dry  Light drainage may occur the first 2 days postop  DO NOT REMOVE THE DRESSINGS until you are seen in our office  Cover the bandages appropriately while washing to keep them from getting wet  Please call our office ([phone]) if you experience either of the following:  Sudden increase in swelling, redness, or warmth at the surgical site  Excessive incisional drainage that persists beyond the 3rd day after surgery  Oral temperature greater than 101 degrees, not relieved with Tylenol  Shortness of breath, chest pain, nausea, or any other concerning symptoms    SWELLING CONTROL:  Cold Therapy: The cold therapy device may be used either continuously or only as needed, according to your preference  Do not let the pad directly touch your skin  Alternatively, apply ice (20 min on, 20 min off) as often as you feel is necessary  Elevation:  Elevate the entire leg above heart level  Place pillows under your ankle to keep your knee straight  Compression:  Apply ACE wraps or a thigh-length compression stocking as needed  RANGE OF MOTION:  Stay in splint, you may wiggle your toes     IMMOBILIZATION:  DO NOT REMOVE YOUR SPLINT  Keep it clean and dry  ACTIVITY:   DO NOT BEAR WEIGHT on the operative leg  Always use crutches  Using Crutches on Stairs:  Going up, lead with your "good" (nonoperative) leg  Going down, lead with your "bad" (operative) leg  Use a hand rail when available  Knee Extension:  Place a rolled towel or pillow under your ankle for 20-30 minutes 3-5 times per day  This will help to maintain full knee extension  Quad Sets:  Sit or lie with your knee straight  Tighten your quadriceps (front thigh) muscle  Hold for 3 seconds, then relax  Repeat 20 times per hour while awake  PHYSICAL THERAPY:  You will be given a physical therapy prescription when you are seen in the office for your postoperative appointment  FOLLOW-UP APPOINTMENT:  10-14 days after surgery with:    JEYSON Max  [facility]  [phone]

## 2023-03-16 NOTE — TELEPHONE ENCOUNTER
Caller: Patient     Doctor/Office: Saul    Call regarding :  Surgery/WC     Call was transferred to: surgery coordinator
Caller: Patient    Doctor: Chanell Breen    Reason for call: Patient requesting a work status note to be released to go back on Monday 3/20  Also if the provider can write a note stating that DME was needed such as a walker, a knee scooter and shower chair was medically necessary so that she can be reimbursed by [facility]  The patient did state that she needed these DME  {Please Advise!     Call back#: [phone]
Patient was notified that note was placed in chart 
nosebleed

## 2023-03-17 ENCOUNTER — TELEPHONE (OUTPATIENT)
Dept: OBGYN CLINIC | Facility: HOSPITAL | Age: 58
End: 2023-03-17

## 2023-03-17 NOTE — TELEPHONE ENCOUNTER
I spoke to patient and advised it can be normal for extended nerve block to wax and wain  Patient instructed to check cap refill which was WNL  Patient has some swelling of the toes  Some patients have block last for 6 days  Advised swelling can cause numbness as well  Ice and elevation above the heart will help  Keep trying to flex and extend the toes to help with swelling  If dressing feels too tight, ok to loosen outer ace  Patient is on her last day of Naproxen, does have issues with BP so she will stick with tylenol 1000mg TID for non-narcotic means of pain control  She is taking minimal amount of Tramadol at this point  She will call with any further questions or concerns

## 2023-03-17 NOTE — TELEPHONE ENCOUNTER
Caller: Patient     Doctor: Shivam Orlando     Reason for call: Patient recently had surgery on 3/13/23 patient had OPEN REDUCTION W/ INTERNAL FIXATION (ORIF) ANKLE  Patient called today stating she is not able to feel her toes as much as she would elio, patient states she is able to move them sightly and she wanted to know if this was normal  Patient has not taken tramadol since Tuesday  Patient states they are very swollen and she wanted to know if she should be taking something else       Call back#: [phone]

## 2023-03-24 ENCOUNTER — OFFICE VISIT (OUTPATIENT)
Dept: OBGYN CLINIC | Facility: CLINIC | Age: 58
End: 2023-03-24

## 2023-03-24 ENCOUNTER — TELEPHONE (OUTPATIENT)
Dept: OBGYN CLINIC | Facility: HOSPITAL | Age: 58
End: 2023-03-24

## 2023-03-24 VITALS
HEIGHT: 64 IN | SYSTOLIC BLOOD PRESSURE: 146 MMHG | BODY MASS INDEX: 36.39 KG/M2 | DIASTOLIC BLOOD PRESSURE: 84 MMHG | HEART RATE: 82 BPM

## 2023-03-24 DIAGNOSIS — Z87.81 S/P ORIF (OPEN REDUCTION INTERNAL FIXATION) FRACTURE: Primary | ICD-10-CM

## 2023-03-24 DIAGNOSIS — Z98.890 S/P ORIF (OPEN REDUCTION INTERNAL FIXATION) FRACTURE: Primary | ICD-10-CM

## 2023-03-24 NOTE — PROGRESS NOTES
ORTHOPEDIC ONCOLOGY POST OPERATIVE NOTE    Patient: Hina Zendejas   Age: 65 y o  Sex: female  Gender: female  Date of Visit: 03/24/23    DOS: 03/13/2023  Procedure performed: ORIF of right ankle    Chief Complaint   Patient presents with   • Right Ankle - Follow-up, Post-op       Subjective:  65 y o  female POD # 11 days s/p ORIF of right ankle     Pain/Complaints: none, no longer taking tramadol    Numbness/weakness extremity: as expected post operatively    Physical Therapy Progress: N/a   DVT ppx: none   Abx: finsihed   Eating/Drinking improving  Bowel/Bladder: WNL   Denies fever/chills, numbness/tingling, injury/trauma, night sweats    Physical Exam:  Height: 5' 4" (162 6 cm)  Weight - Scale:  (unable to obtain)         Vitals:    03/24/23 0802   BP: 146/84   Pulse: 82     Body mass index is 36 39 kg/m²  General: alert and oriented x 3; well nourished/well developed; no apparent distress  Extremity/Brace/Sling: mild/moderate swelling throughout  Dressing/Surgical site: Dressing removed, suture line intact, no drainage or erythema of incision site, suture removed without complication  Motor/Senstation:  strength 5/5 Dorsi/plantar flexion 5/5; SILT distally  Brisk cap refill  Calf: bilateral calves soft, nontender    right ankle -   Patient ambulates with nwb gait pattern  Uses scooter assistive device  No anatomical deformity  Skin is warm and dry to touch with no signs of erythema, ecchymosis, infection  Mild generalized soft tissue swelling or effusion noted  ROM DF to almost neutral, PF to 30 deg  TTP over lateral mal  MMT: 4/5 throughout      Calf compartments are soft and supple  2+ TP and DP pulses with brisk capillary refill to the toes  Sural, saphenous, tibial, superficial, and deep peroneal motor and sensory distributions intact  Sensation to light touch intact distally    Impression/Plan: 65 y o  female 11 days s/p ORIF of right ankle    1   S/p   - Pain - continue NSAIDs / tylenol as needed   -Wound Care- OK to shower  Dab dry with towel  Steristrips will fall off on their own  No soaking/bathing for another 1-2 weeks  Discussed scar care with vitamin E oil  - continue Ice packs/elevation  Compression with GUILLERMINA/Spanx   - D/C splint- CAM boot now   Can take off when sleeping or showering    - NWB with assistance  - begin formal PT, outpatient PT script given to patient    FOLLOW UP: 4 weeks w/ new x-rays      Destiney Callahan   3/24/2023 8:10 AM

## 2023-03-24 NOTE — TELEPHONE ENCOUNTER
Caller: [employer]     Doctor/OfficeGaynel Brigitte    CB#: [phone]      What needs to be faxed: 3/24/23 OV & Work Status    ATTN to: Uriel Heck    Fax#: [phone]      Documents were successfully e-faxed

## 2023-03-27 ENCOUNTER — TELEPHONE (OUTPATIENT)
Dept: OBGYN CLINIC | Facility: HOSPITAL | Age: 58
End: 2023-03-27

## 2023-03-27 NOTE — TELEPHONE ENCOUNTER
Caller: Ana    Doctor/Office: jeromy    CB#: [phone]      What needs to be faxed: Office visit note & work status letter    ATTN to: Mike Floyd    Fax#: [phone]      Documents were successfully e-faxed Mauc Instructions: By selecting yes to the question below the MAUC number will be added into the note.  This will be calculated automatically based on the diagnosis chosen, the size entered, the body zone selected (H,M,L) and the specific indications you chose. You will also have the option to override the Mohs AUC if you disagree with the automatically calculated number and this option is found in the Case Summary tab.

## 2023-03-27 NOTE — TELEPHONE ENCOUNTER
Caller: [employer]    Doctor: Suzi Stark    Reason for call: fax over pt scripts, PT, CAM Boot  Fax: [phone]    Call back#: [phone]

## 2023-03-29 ENCOUNTER — EVALUATION (OUTPATIENT)
Dept: PHYSICAL THERAPY | Facility: CLINIC | Age: 58
End: 2023-03-29

## 2023-03-29 DIAGNOSIS — Z47.89 AFTERCARE FOLLOWING SURGERY OF THE MUSCULOSKELETAL SYSTEM: ICD-10-CM

## 2023-03-29 NOTE — PROGRESS NOTES
PT Evaluation     Today's date: 3/29/2023  Patient name: Lucas Wheeler  : 1965  MRN: [id number]  Referring provider: Zachery Arciniega PA-C  Dx:   Encounter Diagnosis     ICD-10-CM    1  Aftercare following surgery of the musculoskeletal system  Z47 [id number] Ambulatory referral to Physical Therapy                     Assessment  Assessment details: Lucas Wheeler is a 65 y o  female who presents to outpatient PT with a  Aftercare following surgery of the musculoskeletal system, S/P right fibular fracture ORIF, DOS 3/13  Patient is to continue NWB in RLE, until MD follow up  No further referral appears necessary at this time based upon examination results  Pt presents with decreased strength, ROM, balance, functional activity tolerance, and pain with movement in her right foot ankle which is limiting her  ability to perform the aforementioned functional activities  Etiologic factors include repetitive poor body mechanics  Prognosis is good given HEP compliance and PT 2/wk  Please contact me if you have any questions or recommendations  Thank you for the opportunity to share in  [facility]  Impairments: abnormal gait, abnormal muscle firing, abnormal or restricted ROM, abnormal movement, activity intolerance, impaired balance, impaired physical strength, lacks appropriate home exercise program, pain with function and weight-bearing intolerance  Functional limitations: Difficulty with ambulation, ADL's  IADL's, Return to work, stair negotiation, driving  Goals  STG to be achieved in 4 weeks     1  Pt will reduce subjective pain rating by at least 50 percent the help facilitate return to PLOF  2  Pt will improve Right ankle AROM/PROM  by at least 10 degrees to help promote improved functional activity tolerance   3  Pt will improve right ankle MMT scores by at least 1/2 grade to promote improved functional activity tolerance     LTG to be achieved in 6-8 weeks   1  Pt will be complaint with HEP   2  Pt will improve Right ankle AROM to [facility], to help facilitate independence with ADL's, IADL's, and functional activities   3  Pt will improve Right ankle Strength to [facility] to help facilitate independence with ADL's, IADL's, and functional activities   4  Pt will have no limitations with ambulation to help facilitate independence at home and in the community  5  Pt will have no limitations with stair negotiation to help facilitate independence at home and in the community         Plan  Patient would benefit from: skilled physical therapy  Planned therapy interventions: ADL training, balance, balance/weight bearing training, flexibility, functional ROM exercises, gait training, graded exercise, graded activity, home exercise program, graded motor, joint mobilization, manual therapy, neuromuscular re-education, patient education, postural training, strengthening, stretching, therapeutic activities and therapeutic exercise  Frequency: 2x week  Duration in weeks: 12  Plan of Care beginning date: 3/29/2023  Plan of Care expiration date: 2023  Treatment plan discussed with: patient, PTA and referring physician        Subjective Evaluation    History of Present Illness  Mechanism of injury: The patient is a 65year old female who presents to outpatient PT following fx of right fibula  She is NWB in CAM boot for the next 4 weeks  She ambulates with RW this date  She is able to safely maintain NWB     Pain  Current pain ratin  At best pain ratin  At worst pain ratin  Quality: discomfort  Relieving factors: change in position, relaxation, rest, support and medications  Aggravating factors: standing, stair climbing, lifting and walking  Progression: improved    Treatments  Previous treatment: immobilization  Current treatment: immobilization and physical therapy  Patient Goals  Patient goals for therapy: increased strength, decreased pain and increased motion  Patient goal: return to better than normal Objective     Active Range of Motion     Right Ankle/Foot   Dorsiflexion (ke): -10 degrees   Plantar flexion: 50 degrees   Inversion: 10 degrees   Eversion: 1 degrees     Additional Active Range of Motion Details  Strength NT this date              Precautions: S/P right  fibular fractire ORIF 3/13 NWB       Manuals             PROM right ankle  RR                                                   Neuro Re-Ed             Hip adduction              Hip abduction              SLR 4 directions              LAQ             HS Curls seated                                        Ther Ex             Gastroc stretch  HEP            Ankle pump  HEP            Ankle ABC  HEP             Ankle isometric                           Towel Inv/EV                                        Ther Activity                                       Gait Training                                       Modalities

## 2023-03-30 NOTE — TELEPHONE ENCOUNTER
Caller: [employer]    Doctor: Danae Angelucci    Reason for call: [employer] is calling for a letter to be placed explaining patients work status      Fax #: [phone]    Call back#: [phone]

## 2023-04-05 ENCOUNTER — OFFICE VISIT (OUTPATIENT)
Dept: PHYSICAL THERAPY | Facility: CLINIC | Age: 58
End: 2023-04-05

## 2023-04-05 DIAGNOSIS — Z47.89 AFTERCARE FOLLOWING SURGERY OF THE MUSCULOSKELETAL SYSTEM: Primary | ICD-10-CM

## 2023-04-05 NOTE — PROGRESS NOTES
Daily Note     Today's date: 2023  Patient name: Orestes Trevizo  : 1965  MRN: [id number]  Referring provider: Benji Sutherland PA-C  Dx:   Encounter Diagnosis     ICD-10-CM    1  Aftercare following surgery of the musculoskeletal system  Z47 89                      Subjective: Patient is doing well  Right ankle is asymptomatic this date  Objective: See treatment diary below      Assessment: Tolerated treatment well  Patient exhibited good technique with therapeutic exercises and would benefit from continued PT  Good tolerance to initial treatment  Educated on weightbearing status  Good carryover noted       Plan: Continue per plan of care        Precautions: S/P right  fibular fractire ORIF 3/13 NWB       Manuals  4/5           PROM right ankle  RR RR                                                  Neuro Re-Ed             Hip adduction   5''20x           Hip abduction   Black 5''20x           SLR 4 directions   2x10 flexion abduction            LAQ             HS Curls seated                                        Ther Ex             Gastroc stretch  HEP 30''3x           Ankle pump  HEP 30x           Ankle ABC  HEP  1x           Ankle isometric   5''10x           Baps   20x each direction           Towel Inv/EV                                        Ther Activity                                       Gait Training                                       Modalities

## 2023-04-21 ENCOUNTER — APPOINTMENT (OUTPATIENT)
Dept: RADIOLOGY | Facility: MEDICAL CENTER | Age: 58
End: 2023-04-21

## 2023-04-21 DIAGNOSIS — Z87.81 S/P ORIF (OPEN REDUCTION INTERNAL FIXATION) FRACTURE: ICD-10-CM

## 2023-04-21 DIAGNOSIS — Z98.890 S/P ORIF (OPEN REDUCTION INTERNAL FIXATION) FRACTURE: ICD-10-CM

## 2023-04-26 ENCOUNTER — OFFICE VISIT (OUTPATIENT)
Dept: PHYSICAL THERAPY | Facility: CLINIC | Age: 58
End: 2023-04-26

## 2023-04-26 DIAGNOSIS — Z47.89 AFTERCARE FOLLOWING SURGERY OF THE MUSCULOSKELETAL SYSTEM: Primary | ICD-10-CM

## 2023-04-26 NOTE — PROGRESS NOTES
Daily Note     Today's date: 2023  Patient name: Lalitha Arriaga  : 1965  MRN: [id number]  Referring provider: Tomas Naylor PA-C  Dx:   Encounter Diagnosis     ICD-10-CM    1  Aftercare following surgery of the musculoskeletal system  Z47 89                      Subjective: Pt saw MD  She is WBAT in CAM boot with her RW  She is doing well  Objective: See treatment diary below      Assessment: Tolerated treatment well  Patient exhibited good technique with therapeutic exercises and would benefit from continued PT  Initiated stair training  Ambulates stairs using step to pattern  Advised patient to continue to use RW for ambulation in CAM boot  Verbalizes understanding  Plan: Continue per plan of care        Precautions: S/P right  fibular fractire ORIF 3/13 NWB       Manuals          PROM right ankle  RR RR RR RR RR                                                Neuro Re-Ed             Hip adduction   5''20x 5''20x 5''20x         Hip abduction   Black 5''20x Black 5''20 Black 5''20x         SLR 4 directions   2x10 flexion abduction  2x10 flexion abduction  2x10 flexion abduction          LAQ             HS Curls seated              Biodex      Weight shifting AP Lateral 60 hits                      Ther Ex             Gastroc stretch  HEP 30''3x 30''3x 30''3x 30''3x        Ankle pump  HEP 30x 30x 30x         Ankle ABC  HEP  1x 1x 1x         Ankle isometric   5''10x 5''10x 5''10x Red TB 20x each         Baps   20x each direction 20x each direction  20x each          Towel Inv/EV              Nu step      L3 x 10 min                      Ther Activity                                       Gait Training             Over ground      RW/ SPC x 10 min         Stair training      Step to pattern x 5 min         Modalities

## 2023-05-01 ENCOUNTER — OFFICE VISIT (OUTPATIENT)
Dept: PHYSICAL THERAPY | Facility: CLINIC | Age: 58
End: 2023-05-01

## 2023-05-01 DIAGNOSIS — Z47.89 AFTERCARE FOLLOWING SURGERY OF THE MUSCULOSKELETAL SYSTEM: Primary | ICD-10-CM

## 2023-05-01 NOTE — PROGRESS NOTES
"Daily Note     Today's date: 2023  Patient name: Gely Vergara  : 1965  MRN: [id number]  Referring provider: Yvonne Carpenter PA-C  Dx:   Encounter Diagnosis     ICD-10-CM    1  Aftercare following surgery of the musculoskeletal system  Z47 89                      Subjective: Pt c/o R shin soreness and reports R ankle feels achy today  States she tried walking with a cane over the weekend at home but was painful later that night  Objective: See treatment diary below      Assessment: Pt arrived ambulating with RW, WBAT with CAM boot on R  Tolerated treatment well  Noted difficulty with ankle inversion exercise  Patient demonstrated fatigue post treatment, exhibited good technique with therapeutic exercises and would benefit from continued PT  Plan to issue handouts for HEP  and review with patient next visit  Plan: Continue per plan of care        Precautions: S/P right  fibular fractire ORIF 3/13 WBAT in CAM boot      Manuals         PROM right ankle  RR RR RR RR RR  MJC                                              Neuro Re-Ed             Hip adduction   5''20x 5''20x 5''20x  5\" x20       Hip abduction   Black 5''20x Black 5''20 Black 5''20x  Black  5\" x20         SLR 4 directions   2x10 flexion abduction  2x10 flexion abduction  2x10 flexion abduction   NV       LAQ             HS Curls seated              Biodex      Weight shifting AP Lateral 60 hits  Weight shifting AP Lateral 60 hits                    Ther Ex             Gastroc stretch  HEP 30''3x 30''3x 30''3x 30''3x 30\" x3       Ankle pump  HEP 30x 30x 30x  x30       Ankle ABC  HEP  1x 1x 1x  x1       Ankle isometric   5''10x 5''10x 5''10x Red TB 20x each  RTB  x20 ea       Baps   20x each direction 20x each direction  20x each   x20 ea       Towel Inv/EV              Nu step      L3 x 10 min  L3 x10 min                    Ther Activity                                       Gait Training             Over " ground      RW/ SPC x 10 min         Stair training      Step to pattern x 5 min         Modalities                                           Access Code: [email or web address]  URL: [email or web address]  Date: 05/01/2023  Prepared by:  Nadira Pena    Exercises  - Seated Ankle Dorsiflexion with Resistance  - 1 x daily - 3 x weekly - 2 sets - 10 reps  - Seated Ankle Eversion with Resistance  - 1 x daily - 3 x weekly - 2 sets - 10 reps  - Seated Ankle Inversion with Resistance  - 1 x daily - 3 x weekly - 2 sets - 10 reps  - Seated Ankle Plantar Flexion with Resistance Loop  - 1 x daily - 3 x weekly - 2 sets - 10 reps

## 2023-05-03 ENCOUNTER — OFFICE VISIT (OUTPATIENT)
Dept: PHYSICAL THERAPY | Facility: CLINIC | Age: 58
End: 2023-05-03

## 2023-05-03 DIAGNOSIS — Z47.89 AFTERCARE FOLLOWING SURGERY OF THE MUSCULOSKELETAL SYSTEM: Primary | ICD-10-CM

## 2023-05-03 NOTE — PROGRESS NOTES
"Daily Note     Today's date: 5/3/2023  Patient name: Karyn Light  : 1965  MRN: [id number]  Referring provider: Jose Maria Cain PA-C  Dx:   Encounter Diagnosis     ICD-10-CM    1  Aftercare following surgery of the musculoskeletal system  Z47 89                      Subjective: Patient reports her ankle is doing well today  She has been ambulating with No AD at home  Objective: See treatment diary below      Assessment: Tolerated treatment well  Patient exhibited good technique with therapeutic exercises and would benefit from continued PT  Added multiple exercises in standing this session, to help facilitate increased strength in right LE  Good tolerance to progressions  Cleared to ambulate with No AD  Continue to wear CAM boot  Pt verbalizes understanding  Plan: Continue per plan of care        Precautions: S/P right  fibular fractire ORIF 3/13 WBAT in CAM boot      Manuals 5/3 4/5 4/12  4/19 4/26 5/1   PROM right ankle  RR RR RR RR RR  MJC                              Neuro Re-Ed         Hip adduction   5''20x 5''20x 5''20x  5\" x20   Hip abduction   Black 5''20x Black 5''20 Black 5''20x  Black  5\" x20     SLR 4 directions   2x10 flexion abduction  2x10 flexion abduction  2x10 flexion abduction   NV   LAQ         HS Curls seated          Biodex  Weight shifting AP Lateral 60 hits    Weight shifting AP Lateral 60 hits  Weight shifting AP Lateral 60 hits            Ther Ex         Gastroc stretch  30''3x 30''3x 30''3x 30''3x 30''3x 30\" x3   Ankle pump   30x 30x 30x  x30   Ankle ABC   1x 1x 1x  x1   Ankle isometric  GTB x20  5''10x 5''10x 5''10x Red TB 20x each  RTB  x20 ea   Baps   20x each direction 20x each direction  20x each   x20 ea   Towel Inv/EV          Nu step  L3 x 10 min     L3 x 10 min  L3 x10 min            Mini Squat  Biodex 20x         Step up  A 2x10        Standing three way  10x each                  Ther Activity                           Gait Training         Over ground   " RW/ SPC x 10 min     Stair training      Step to pattern x 5 min     Modalities

## 2023-05-08 ENCOUNTER — OFFICE VISIT (OUTPATIENT)
Dept: PHYSICAL THERAPY | Facility: CLINIC | Age: 58
End: 2023-05-08

## 2023-05-08 DIAGNOSIS — Z47.89 AFTERCARE FOLLOWING SURGERY OF THE MUSCULOSKELETAL SYSTEM: Primary | ICD-10-CM

## 2023-05-08 NOTE — PROGRESS NOTES
"Daily Note     Today's date: 2023  Patient name: Melvin Quintero  : 1965  MRN: [id number]  Referring provider: Stephanie Jarvis PA-C  Dx:   Encounter Diagnosis     ICD-10-CM    1  Aftercare following surgery of the musculoskeletal system  Z47 89                      Subjective: Pt c/o occasional \"twinge\" R lateral ankle and soreness R heel with initial steps after getting out of bed  Reports she always wears CAM boot when walking  States she has been walking without cane since last week  Objective: See treatment diary below      Assessment: Tolerated treatment well  Pt ambulating without AD indoors, CAM boot on R  Issued handouts and reviewed with patient for HEP, instructed patient to modify range and resistance as needed and stop if painful  Patient demonstrated fatigue post treatment, exhibited good technique with therapeutic exercises and would benefit from continued PT      Plan: Continue per plan of care        Precautions: S/P right  fibular fractire ORIF 3/13 WBAT in CAM boot      Manuals 5/3 5/8 4/12  4/19 4/26 5/1   PROM right ankle  RR MJC RR RR RR  MJC                              Neuro Re-Ed         Hip adduction    5''20x 5''20x  5\" x20   Hip abduction    Black 5''20 Black 5''20x  Black  5\" x20     SLR 4 directions   2x10 flexion abduction  2x10 flexion abduction  2x10 flexion abduction   NV   LAQ         HS Curls seated          Biodex  Weight shifting AP Lateral 60 hits Weight shifting AP Lateral 60 hits   Weight shifting AP Lateral 60 hits  Weight shifting AP Lateral 60 hits            Ther Ex         Gastroc stretch  30''3x 30\" x3 30''3x 30''3x 30''3x 30\" x3   Ankle pump    30x 30x  x30   Ankle ABC    1x 1x  x1   Ankle isometric  GTB x20  Green  2x10 ea 5''10x 5''10x Red TB 20x each  RTB  x20 ea   Baps    20x each direction  20x each   x20 ea   Towel Inv/EV          Nu step  L3 x 10 min  L3 x10 min   L3 x 10 min  L3 x10 min            Mini Squat  Biodex 20x  Biodex  x20       " "  Step up  A 2x10 \"A\" 2x10       Standing three way  10x each  x10 ea                Ther Activity                           Gait Training         Over ground      RW/ SPC x 10 min     Stair training      Step to pattern x 5 min     Modalities                                "

## 2023-05-10 ENCOUNTER — APPOINTMENT (OUTPATIENT)
Dept: PHYSICAL THERAPY | Facility: CLINIC | Age: 58
End: 2023-05-10
Payer: OTHER MISCELLANEOUS

## 2023-05-12 DIAGNOSIS — I10 PRIMARY HYPERTENSION: ICD-10-CM

## 2023-05-15 ENCOUNTER — OFFICE VISIT (OUTPATIENT)
Dept: PHYSICAL THERAPY | Facility: CLINIC | Age: 58
End: 2023-05-15

## 2023-05-15 DIAGNOSIS — Z47.89 AFTERCARE FOLLOWING SURGERY OF THE MUSCULOSKELETAL SYSTEM: Primary | ICD-10-CM

## 2023-05-15 RX ORDER — LOSARTAN POTASSIUM AND HYDROCHLOROTHIAZIDE 25; 100 MG/1; MG/1
1 TABLET ORAL DAILY
Qty: 90 TABLET | Refills: 0 | Status: SHIPPED | OUTPATIENT
Start: 2023-05-15 | End: 2023-08-13

## 2023-05-15 NOTE — PROGRESS NOTES
"Daily Note     Today's date: 5/15/2023  Patient name: Joshua Villanueva  : 1965  MRN: [id number]  Referring provider: Litzy Ziegler PA-C  Dx:   Encounter Diagnosis     ICD-10-CM    1  Aftercare following surgery of the musculoskeletal system  Z47 89           Start Time: 1504  Stop Time: 1555  Total time in clinic (min): 51 minutes    Subjective: Pt reports R ankle just feels achy after work day  Objective: See treatment diary below       Assessment: Tolerated treatment well  Performed exercises in sneaker today as documented  Encouraged patient to continue to wear CAM boot especially outdoors and gradually wean into sneaker indoors  Patient demonstrated fatigue post treatment, exhibited good technique with therapeutic exercises and would benefit from continued PT      Plan: Continue per plan of care        Precautions: S/P right  fibular fractire ORIF 3/13 WBAT in CAM boot, wean to sneaker as tolerated ()      Manuals 5/3 5/8 5/15 4/19 4/26 5/1   PROM right ankle  RR MJC MJC RR RR  MJC                              Neuro Re-Ed         Hip adduction    5''20x 5''20x  5\" x20   Hip abduction    Black 5''20 Black 5''20x  Black  5\" x20     SLR 4 directions   2x10 flexion abduction  2x10 flexion abduction  2x10 flexion abduction   NV   LAQ   5\" x20      HS Curls seated          Biodex wt shift   To 100% w/ sneaker      Biodex  Weight shifting AP Lateral 60 hits Weight shifting AP Lateral 60 hits Weight shifting AP Lateral 60 hits  Weight shifting AP Lateral 60 hits  Weight shifting AP Lateral 60 hits            Ther Ex         Gastroc stretch  30''3x 30\" x3 30''3x 30''3x 30''3x 30\" x3   Ankle pump     30x  x30   Ankle ABC     1x  x1   Ankle isometric  GTB x20  Green  2x10 ea GTB  2x10 5''10x Red TB 20x each  RTB  x20 ea   Baps     20x each   x20 ea   Towel Inv/EV          Nu step  L3 x 10 min  L3 x10 min L2 x10 min in sneaker  L3 x 10 min  L3 x10 min            Mini Squat  Biodex 20x  Biodex  x20   " "Biodex x10       Step up  A 2x10 \"A\" 2x10 np      Standing three way  10x each  x10 ea x10 RLE only today               Ther Activity                           Gait Training         Over ground    In // bars heel/toe gait  x3 passes    RW/ SPC x 10 min     Stair training      Step to pattern x 5 min     Modalities                                "

## 2023-05-17 ENCOUNTER — APPOINTMENT (OUTPATIENT)
Dept: PHYSICAL THERAPY | Facility: CLINIC | Age: 58
End: 2023-05-17
Payer: OTHER MISCELLANEOUS

## 2023-05-22 ENCOUNTER — OFFICE VISIT (OUTPATIENT)
Dept: PHYSICAL THERAPY | Facility: CLINIC | Age: 58
End: 2023-05-22

## 2023-05-22 DIAGNOSIS — Z47.89 AFTERCARE FOLLOWING SURGERY OF THE MUSCULOSKELETAL SYSTEM: Primary | ICD-10-CM

## 2023-05-22 NOTE — PROGRESS NOTES
"Daily Note     Today's date: 2023  Patient name: Maddy Castellon  : 1965  MRN: [id number]  Referring provider: Haskell Duverney, PA-C  Dx:   Encounter Diagnosis     ICD-10-CM    1  Aftercare following surgery of the musculoskeletal system  Z47 89                      Subjective: Pt reports she has been wearing her sneaker at home  She does note occasional pain across her talus        Objective: See treatment diary below      Assessment: Tolerated treatment well  Patient exhibited good technique with therapeutic exercises and would benefit from continued PT  Initiated gait training on treadmill this session, in sneaker, to help promote normalized gait pattern  good tolerance to progression  All TE  performed in sneaker this date  Plan: Continue per plan of care        Precautions: S/P right  fibular fractire ORIF 3/13 WBAT in CAM boot, wean to sneaker as tolerated ()      Manuals 5/3 5/8 5/15 5/22 4/26 5/1   PROM right ankle  RR MJC MJC RR RR  MJC                              Neuro Re-Ed         Hip adduction    5''20x   5\" x20   Hip abduction    Black 5''20   Black  5\" x20     SLR 4 directions   2x10 flexion abduction  2x10 flexion abduction     NV   LAQ   5\" x20      HS Curls seated          Biodex wt shift   To 100% w/ sneaker      Biodex  Weight shifting AP Lateral 60 hits Weight shifting AP Lateral 60 hits Weight shifting AP Lateral 60 hits Weight shifting AP Lateral 60 hits Weight shifting AP Lateral 60 hits  Weight shifting AP Lateral 60 hits            Ther Ex         Gastroc stretch  30''3x 30\" x3 30''3x 30''3x 30''3x 30\" x3   Ankle pump       x30   Ankle ABC       x1   Ankle isometric  GTB x20  Green  2x10 ea GTB  2x10 Green 2x10  Red TB 20x each  RTB  x20 ea   Baps       x20 ea   Towel Inv/EV          Nu step  L3 x 10 min  L3 x10 min L2 x10 min in sneaker L3 x 10 min  L3 x 10 min  L3 x10 min            Mini Squat  Biodex 20x  Biodex  x20   Biodex x10  Biodex 10x      Step up  A 2x10 " "\"A\" 2x10 np B 2x10      Standing three way  10x each  x10 ea x10 RLE only today               Ther Activity                           Gait Training         Over ground    In // bars heel/toe gait  x3 passes   TM 1 0 MPh x 5 min  RW/ SPC x 10 min     Stair training      Step to pattern x 5 min     Modalities                                "

## 2023-05-24 ENCOUNTER — OFFICE VISIT (OUTPATIENT)
Dept: PHYSICAL THERAPY | Facility: CLINIC | Age: 58
End: 2023-05-24

## 2023-05-24 DIAGNOSIS — Z47.89 AFTERCARE FOLLOWING SURGERY OF THE MUSCULOSKELETAL SYSTEM: Primary | ICD-10-CM

## 2023-05-24 NOTE — PROGRESS NOTES
"Daily Note     Today's date: 2023  Patient name: Jose Dugan  : 1965  MRN: [id number]  Referring provider: Jeremy Nolan PA-C  Dx:   Encounter Diagnosis     ICD-10-CM    1  Aftercare following surgery of the musculoskeletal system  Z47 89                      Subjective: Pt reports she has been wearing sneaker on R since last PT treatment  Objective: See treatment diary below      Assessment: Tolerated treatment well  Gradual progression with exercise  Patient demonstrated fatigue post treatment, exhibited good technique with therapeutic exercises and would benefit from continued PT      Plan: Continue per plan of care        Precautions: S/P right  fibular fractire ORIF 3/13 WBAT in CAM boot, wean to sneaker as tolerated ()      Manuals 5/3 5/8 5/15 5/22 5/24 5/1   PROM right ankle  RR MJC MJC RR MJC MJC                              Neuro Re-Ed         Hip adduction    5''20x   5\" x20   Hip abduction    Black 5''20   Black  5\" x20     SLR 4 directions   2x10 flexion abduction  2x10 flexion abduction     NV   LAQ   5\" x20  1# 5\" 2x10    HS Curls seated          Biodex wt shift   To 100% w/ sneaker      Biodex  Weight shifting AP Lateral 60 hits Weight shifting AP Lateral 60 hits Weight shifting AP Lateral 60 hits Weight shifting AP Lateral 60 hits Weight shifting AP/ Lateral 60 hits  Weight shifting AP Lateral 60 hits   Biodex LOS      staticMed diff x2    Biodex mini squats     2x10    Ther Ex         Gastroc stretch  30''3x 30\" x3 30''3x 30''3x SB  30''3x 30\" x3   Ankle pump       x30   Ankle ABC       x1   Ankle isometric  GTB x20  Green  2x10 ea GTB  2x10 Green 2x10  Blue TB 20x each  RTB  x20 ea   Baps       x20 ea   Towel Inv/EV          Nu step  L3 x 10 min  L3 x10 min L2 x10 min in sneaker L3 x 10 min  L3 x 10 min  L3 x10 min            Mini Squat  Biodex 20x  Biodex  x20   Biodex x10  Biodex 10x  biodex    Step up  A 2x10 \"A\" 2x10 np B 2x10  \"B\"  2x10    Standing three way  10x " each  x10 ea x10 RLE only today  x10              Ther Activity                           Gait Training         Over ground    In // bars heel/toe gait  x3 passes   TM 1 0 MPh x 5 min  TM  1 2 mph x7 min    Stair training           Modalities

## 2023-05-31 ENCOUNTER — EVALUATION (OUTPATIENT)
Dept: PHYSICAL THERAPY | Facility: CLINIC | Age: 58
End: 2023-05-31

## 2023-05-31 DIAGNOSIS — Z47.89 AFTERCARE FOLLOWING SURGERY OF THE MUSCULOSKELETAL SYSTEM: Primary | ICD-10-CM

## 2023-05-31 NOTE — PROGRESS NOTES
PT Progress Note     Today's date: 2023  Patient name: Kev Woodson  : 1965  MRN: [id number]  Referring provider: Aurelio Law PA-C  Dx:   Encounter Diagnosis     ICD-10-CM    1  Aftercare following surgery of the musculoskeletal system  Z47 89                Assessment  Assessment details: Kev Woodson is a 65 y o  female who presents to outpatient PT with a  Aftercare following surgery of the musculoskeletal system, S/P right fibular fracture ORIF, DOS 3/13  Patient is to continue NWB in RLE, until MD follow up  No further referral appears necessary at this time based upon examination results  Pt presents with decreased strength, ROM, balance, functional activity tolerance, and pain with movement in her right foot ankle which is limiting her  ability to perform the aforementioned functional activities  Etiologic factors include repetitive poor body mechanics  Prognosis is good given HEP compliance and PT 2/wk  Please contact me if you have any questions or recommendations  Thank you for the opportunity to share in  [facility]  RA      Kev Woodson has demonstrated decreased pain, increased strength, increased range of motion, and increased activity tolerance since starting physical therapy services  She reports an overall improvement of 85% thus far  She is progressing well, Weaned out of CAM boot and has transitioned to sneaker  Mild difficulty with descending stairs step over steThey continue to present with pain, decreased strength, decreased range of motion, and decreased activity tolerance and would benefit from additional skilled physical therapy interventions to address impairments and maximize function          Impairments: abnormal gait, abnormal muscle firing, abnormal or restricted ROM, abnormal movement, activity intolerance, impaired balance, impaired physical strength, lacks appropriate home exercise program, pain with function and weight-bearing intolerance  Functional limitations: Difficulty with ambulation, ADL's  IADL's, Return to work, stair negotiation, driving  Goals  STG to be achieved in 4 weeks     1  Pt will reduce subjective pain rating by at least 50 percent the help facilitate return to PLOF  Met   2  Pt will improve Right ankle AROM/PROM  by at least 10 degrees to help promote improved functional activity tolerance   Met   3  Pt will improve right ankle MMT scores by at least 1/2 grade to promote improved functional activity tolerance   Met     LTG to be achieved in 6-8 weeks   1  Pt will be complaint with HEP   Met   2  Pt will improve Right ankle AROM to [facility], to help facilitate independence with ADL's, IADL's, and functional activities   Progressing   3  Pt will improve Right ankle Strength to [facility] to help facilitate independence with ADL's, IADL's, and functional activities   Progressing   4  Pt will have no limitations with ambulation to help facilitate independence at home and in the community  Progressing   5   Pt will have no limitations with stair negotiation to help facilitate independence at home and in the community   Progressing         Plan  Patient would benefit from: skilled physical therapy  Planned therapy interventions: ADL training, balance, balance/weight bearing training, flexibility, functional ROM exercises, gait training, graded exercise, graded activity, home exercise program, graded motor, joint mobilization, manual therapy, neuromuscular re-education, patient education, postural training, strengthening, stretching, therapeutic activities and therapeutic exercise  Frequency: 2x week  Duration in weeks: 12  Plan of Care beginning date: 3/29/2023  Plan of Care expiration date: 5/29/2023  Treatment plan discussed with: patient, PTA and referring physician        Subjective Evaluation    History of Present Illness  Mechanism of injury: The patient is a 65year old female who presents to outpatient PT following fx of right "fibula  She is NWB in CAM boot for the next 4 weeks  She ambulates with RW this date  She is able to safely maintain NWB     Pain     RA       Current pain ratin   0  At best pain ratin   0  At worst pain ratin   0  Quality: discomfort  Relieving factors: change in position, relaxation, rest, support and medications  Aggravating factors: standing, stair climbing, lifting and walking  Progression: improved    Treatments  Previous treatment: immobilization  Current treatment: immobilization and physical therapy  Patient Goals  Patient goals for therapy: increased strength, decreased pain and increased motion  Patient goal: return to better than normal         Objective     Active Range of Motion         RA    Right Ankle/Foot   Dorsiflexion (ke): -10 degrees   11 deg   Plantar flexion: 50 degrees    55 deg   Inversion: 10 degrees     30 deg       Eversion: 1 degrees     10 deg     Additional Active Range of Motion Details  Strength NT this date            grossly                         Precautions: S/P right  fibular fractire ORIF 3/13 WBAT in CAM boot, wean to sneaker as tolerated ()      Manuals 5/3 5/8 5/15 5/22 5/24 5/31    PROM right ankle  RR MJC MJC RR MJC RR                              Neuro Re-Ed         Hip adduction    5''20x      Hip abduction    Black 5''20      SLR 4 directions   2x10 flexion abduction  2x10 flexion abduction        LAQ   5\" x20  1# 5\" 2x10    HS Curls seated          Biodex wt shift   To 100% w/ sneaker      Biodex  Weight shifting AP Lateral 60 hits Weight shifting AP Lateral 60 hits Weight shifting AP Lateral 60 hits Weight shifting AP Lateral 60 hits Weight shifting AP/ Lateral 60 hits  Weight shifting AP Lateral 60 hits    L5    Biodex LOS      staticMed diff x2 Static med L5    Biodex mini squats     2x10    Ther Ex         Gastroc stretch  30''3x 30\" x3 30''3x 30''3x SB  30''3x SB 30''3x   Ankle pump          Ankle ABC          Ankle isometric  GTB x20  " "Green  2x10 ea GTB  2x10 Green 2x10  Blue TB 20x each     Baps          Towel Inv/EV          Nu step  L3 x 10 min  L3 x10 min L2 x10 min in sneaker L3 x 10 min  L3 x 10 min              Mini Squat  Biodex 20x  Biodex  x20   Biodex x10  Biodex 10x  biodex 2x10 Biodex    Step up  A 2x10 \"A\" 2x10 np B 2x10  \"B\"  2x10 B 2x10   Standing three way  10x each  x10 ea x10 RLE only today  x10  10x            Ther Activity                           Gait Training         Over ground    In // bars heel/toe gait  x3 passes   TM 1 0 MPh x 5 min  TM  1 2 mph x7 min    Stair training           Modalities                                "

## 2023-06-05 ENCOUNTER — OFFICE VISIT (OUTPATIENT)
Dept: PHYSICAL THERAPY | Facility: CLINIC | Age: 58
End: 2023-06-05
Payer: OTHER MISCELLANEOUS

## 2023-06-05 DIAGNOSIS — Z47.89 AFTERCARE FOLLOWING SURGERY OF THE MUSCULOSKELETAL SYSTEM: Primary | ICD-10-CM

## 2023-06-05 PROCEDURE — 97112 NEUROMUSCULAR REEDUCATION: CPT

## 2023-06-05 PROCEDURE — 97110 THERAPEUTIC EXERCISES: CPT

## 2023-06-05 PROCEDURE — 97140 MANUAL THERAPY 1/> REGIONS: CPT

## 2023-06-05 PROCEDURE — 97116 GAIT TRAINING THERAPY: CPT

## 2023-06-05 NOTE — PROGRESS NOTES
"Daily Note     Today's date: 2023  Patient name: Carlton Paz  : 1965  MRN: [id number]  Referring provider: Javier Orr PA-C  Dx:   Encounter Diagnosis     ICD-10-CM    1  Aftercare following surgery of the musculoskeletal system  [id number]                      Subjective: Pt reports she notices improvement daily R ankle  States she returns to Dr Mel Turner tomorrow  Objective: See treatment diary below      Assessment: Tolerated treatment well  Improved gait noted in sneaker, proper heel strike and toe off  Minimal edema noted R ankle end of workday  Patient demonstrated fatigue post treatment, exhibited good technique with therapeutic exercises and would benefit from continued PT      Plan: Continue per plan of care        Precautions: S/P right  fibular fractire ORIF 3/13       Manuals 5/15 5/22 5/24 5/31  6/5   PROM right ankle  MJC RR MJC RR MJC                           Neuro Re-Ed        Hip adduction  5''20x       Hip abduction  Black 5''20       SLR 4 directions  2x10 flexion abduction         LAQ 5\" x20  1# 5\" 2x10  1 5# 2x10   HS Curls seated         Biodex wt shift To 100% w/ sneaker       Biodex  Weight shifting AP Lateral 60 hits Weight shifting AP Lateral 60 hits Weight shifting AP/ Lateral 60 hits  Weight shifting AP Lateral 60 hits    L5  Weight shift L5 AP, lat x60 hits    Biodex LOS    staticMed diff x2 Static med L5  L 5 med diff x2   Biodex mini squats   2x10  2x10   Ther Ex        Gastroc stretch  30''3x 30''3x SB  30''3x SB 30''3x SB 30\" x3   Ankle pump         Ankle ABC         Ankle isometric  GTB  2x10 Green 2x10  Blue TB 20x each   Blue TB  x20 ea   Baps         Towel Inv/EV         Nu step  L2 x10 min in sneaker L3 x 10 min  L3 x 10 min  L3 x10 min L3 x10 min           Mini Squat  Biodex x10  Biodex 10x  biodex 2x10 Biodex     Step up  np B 2x10  \"B\"  2x10 B 2x10 \"B\" 2x10   Standing three way  x10 RLE only today  x10  10x x10 ea   Leg Press  squats       80# x20  B/L " Leg Press HR/TR     60# x20   Ther Activity                        Gait Training        Over ground  In // bars heel/toe gait  x3 passes   TM 1 0 MPh x 5 min  TM  1 2 mph x7 min  TM  1 5 mph x 7 min   Stair training          Modalities

## 2023-06-06 ENCOUNTER — OFFICE VISIT (OUTPATIENT)
Dept: OBGYN CLINIC | Facility: CLINIC | Age: 58
End: 2023-06-06

## 2023-06-06 ENCOUNTER — APPOINTMENT (OUTPATIENT)
Dept: RADIOLOGY | Facility: MEDICAL CENTER | Age: 58
End: 2023-06-06
Payer: COMMERCIAL

## 2023-06-06 VITALS
HEIGHT: 64 IN | BODY MASS INDEX: 35 KG/M2 | SYSTOLIC BLOOD PRESSURE: 134 MMHG | DIASTOLIC BLOOD PRESSURE: 82 MMHG | WEIGHT: 205 LBS

## 2023-06-06 DIAGNOSIS — S82.409A: ICD-10-CM

## 2023-06-06 DIAGNOSIS — S82.409A: Primary | ICD-10-CM

## 2023-06-06 PROCEDURE — 99024 POSTOP FOLLOW-UP VISIT: CPT | Performed by: STUDENT IN AN ORGANIZED HEALTH CARE EDUCATION/TRAINING PROGRAM

## 2023-06-06 PROCEDURE — 73610 X-RAY EXAM OF ANKLE: CPT

## 2023-06-06 NOTE — LETTER
June 6, 2023     Patient: Jacey Leach  YOB: 1965  Date of Visit: 6/6/2023      To Whom it May Concern:    Jacey Leach is under my professional care  Sachinbrianne Morton was seen in my office on 6/6/2023  Sachin Morton may return to work on 6/6 without limitations   If you have any questions or concerns, please don't hesitate to call           Sincerely,          Irene Benjamin,         CC: No Recipients

## 2023-06-06 NOTE — PROGRESS NOTES
Dr Rick Preciado Operative Note    Assessment/Plan:  65 y o  female 3 months follow up status post right ankle ORIF    · Wound Care   · Continue current care , OK to shower , Encouraged scar care with vitamin E oil  · Pain control: None needed  · Continue ice packs/elevation  · Can continue compression with ACE wrap or compression stockings  · Continue physical therapy / Home exercise program  · WBAT of RLE  · Complete DVT ppx: Done  · Follow-up: PRN    Subjective:  65 y o  female presenting to the office for 3 months follow up, status post right ankle ORIF  DOS: 3/13/2023    Patient states that their pain is none   Patient denies functional limitations about the surgical extremity  Patient denies any other radicular symptoms  Patient denies any instability  Current concerns: none    Patient is taking tylenol for pain as needed  Numbness/weakness extremity: No numbness or tingling / no weakness   Physical Therapy Progress: improving ROM and strengthening of RLE  DVT ppx: Complete  Antibiotics: none  Eating/Drinking improving  Bowel/Bladder: WNL  Patient denies symptoms of an infection       Review of Systems  Review of systems negative unless otherwise specified in HPI    Past Medical History  Past Medical History:   Diagnosis Date   • Abnormal uterine bleeding    • Class 1 obesity due to excess calories in adult 12/09/2020   • Contact lens overwear of both eyes    • Glaucoma     borderline recently started on lumigan    • Hypertension    • Motion sickness    • Seasonal allergies    • Uterine fibroid    • Wears glasses      Past Surgical History  Past Surgical History:   Procedure Laterality Date   • BREAST BIOPSY Right 15yrs benign   • CYSTOSCOPY N/A 12/9/2020    Procedure: CYSTOSCOPY;  Surgeon: Jay Luciano MD;  Location: [facility];  Service: Gynecology   • HYSTERECTOMY     • NC COLONOSCOPY FLX DX W/COLLJ SPEC WHEN PFRMD N/A 10/9/2018    Procedure: COLONOSCOPY;  Surgeon: Bonita Santos MD; Location: [facility];  Service: Gastroenterology   • OR LAPAROSCOPY W TOTAL HYSTERECTOMY UTERUS 250 GM/< N/A 12/9/2020    Procedure: LAP TOTAL HYSTERECTOMY;  Surgeon: Don Meadows MD;  Location: [facility];  Service: Gynecology   • OR LAPAROSCOPY W/RMVL ADNEXAL STRUCTURES Bilateral 12/9/2020    Procedure: SALPINGECTOMY;  Surgeon: Don Meadows MD;  Location: [facility];  Service: Gynecology   • OR OPEN [address] [place] FX W/FIXJ PST LIP Right 3/13/2023    Procedure: OPEN REDUCTION W/ INTERNAL FIXATION (ORIF) ANKLE;  Surgeon: Marquez Robins DO;  Location: [facility];  Service: Orthopedics   • TUBAL LIGATION     • VAGINAL DELIVERY      x3     Current Medications  Current Outpatient Medications on File Prior to Visit   Medication Sig Dispense Refill   • aspirin (ECOTRIN LOW STRENGTH) 81 mg EC tablet Take 1 tablet (81 mg total) by mouth 2 (two) times a day for 14 days 28 tablet 0   • bimatoprost (LUMIGAN) 0 01 % ophthalmic drops Administer 1 drop to both eyes daily at bedtime     • Blood Pressure Monitoring (Blood Pressure Cuff) MISC Use daily 1 each 0   • cetirizine (ZyrTEC) 10 mg tablet Take 10 mg by mouth as needed for allergies     • losartan-hydrochlorothiazide (HYZAAR) 100-25 MG per tablet Take 1 tablet by mouth daily 90 tablet 0   • naproxen (Naprosyn) 500 mg tablet Take 1 tablet (500 mg total) by mouth 2 (two) times a day with meals for 5 days (Patient not taking: Reported on 3/24/2023) 10 tablet 0   • ondansetron (ZOFRAN) 4 mg tablet Take 1 tablet (4 mg total) by mouth every 8 (eight) hours as needed for nausea or vomiting (Patient not taking: Reported on 3/24/2023) 20 tablet 0   • timolol (TIMOPTIC) 0 5 % ophthalmic solution INSTILL 1 DROP INTO EACH EYE IN THE MORNING       No current facility-administered medications on file prior to visit       Physical exam  Exam: right ankle  Incision: healing well no significant drainage no dehiscence no significant erythema   ROM: full  Motor: Positive tib ant, EHL, gastrocsoleus complex  Sensation: Sensory intact to light touch toes webspaces  Brisk Capillary Refill    Imaging:  Study type: XRAY right ankle  Date: 06/06/2023  I have personally reviewed all relevant imaging  My impression is as follows: Well aligned bimalleolar fracture fixation, no evidence of change in alignment or loosening

## 2023-06-14 ENCOUNTER — TELEPHONE (OUTPATIENT)
Dept: OBGYN CLINIC | Facility: MEDICAL CENTER | Age: 58
End: 2023-06-14

## 2023-06-14 DIAGNOSIS — Z12.31 ENCOUNTER FOR SCREENING MAMMOGRAM FOR MALIGNANT NEOPLASM OF BREAST: Primary | ICD-10-CM

## 2023-06-14 NOTE — TELEPHONE ENCOUNTER
Patient called in for mammogram script to be placed into chart  Please review when you get a chance   Thank you

## 2023-06-21 ENCOUNTER — OFFICE VISIT (OUTPATIENT)
Dept: FAMILY MEDICINE CLINIC | Facility: CLINIC | Age: 58
End: 2023-06-21
Payer: COMMERCIAL

## 2023-06-21 VITALS
HEIGHT: 64 IN | BODY MASS INDEX: 35.58 KG/M2 | TEMPERATURE: 97.7 F | HEART RATE: 70 BPM | WEIGHT: 208.4 LBS | DIASTOLIC BLOOD PRESSURE: 70 MMHG | SYSTOLIC BLOOD PRESSURE: 118 MMHG | OXYGEN SATURATION: 98 %

## 2023-06-21 DIAGNOSIS — Z13.220 SCREENING CHOLESTEROL LEVEL: ICD-10-CM

## 2023-06-21 DIAGNOSIS — I10 PRIMARY HYPERTENSION: Primary | ICD-10-CM

## 2023-06-21 DIAGNOSIS — Z13.29 THYROID DISORDER SCREENING: ICD-10-CM

## 2023-06-21 DIAGNOSIS — R73.03 PREDIABETES: ICD-10-CM

## 2023-06-21 DIAGNOSIS — Z23 ENCOUNTER FOR IMMUNIZATION: ICD-10-CM

## 2023-06-21 DIAGNOSIS — Z78.0 POST-MENOPAUSAL: ICD-10-CM

## 2023-06-21 PROCEDURE — 90750 HZV VACC RECOMBINANT IM: CPT | Performed by: NURSE PRACTITIONER

## 2023-06-21 PROCEDURE — 90471 IMMUNIZATION ADMIN: CPT | Performed by: NURSE PRACTITIONER

## 2023-06-21 PROCEDURE — 99214 OFFICE O/P EST MOD 30 MIN: CPT | Performed by: NURSE PRACTITIONER

## 2023-06-21 RX ORDER — LOSARTAN POTASSIUM AND HYDROCHLOROTHIAZIDE 25; 100 MG/1; MG/1
1 TABLET ORAL DAILY
Qty: 90 TABLET | Refills: 1 | Status: SHIPPED | OUTPATIENT
Start: 2023-06-21 | End: 2023-09-19

## 2023-06-21 NOTE — PROGRESS NOTES
Name: Yovani Brooks      : 1965      MRN: [id number]  Encounter Provider: KEO Leonard  Encounter Date: 2023   Encounter department: [address] [place] [address]  Primary hypertension  -     CBC and differential; Future  -     losartan-hydrochlorothiazide (HYZAAR) 100-25 MG per tablet; Take 1 tablet by mouth daily    2  Encounter for immunization  -     Zoster Vaccine Recombinant IM    3  Prediabetes  -     HEMOGLOBIN A1C W/ EAG ESTIMATION; Future    4  Screening cholesterol level  -     Comprehensive metabolic panel; Future  -     Lipid panel; Future    5  Thyroid disorder screening  -     TSH, 3rd generation with Free T4 reflex; Future    6  Post-menopausal  -     DXA bone density spine hip and pelvis; Future; Expected date: 2023    7  BMI 35 0-35 9,adult  -     Ambulatory Referral to Nutrition Services; Future           Subjective      HPI     patient presents for follow-up on hypertension  Patient is currently on losartan-hydrochlorothiazide  Blood pressure very well controlled at 118/70  States that she checks her blood pressure at home and she gets readings in the 130s over 70s  No symptoms  Continue to monitor  Never had a DEXA scan and is postmenopausal and has had a fracture  DEXA scan ordered  States she has been having difficulty losing weight  Counseled on diet and exercise  Referral to nutritional services provided recommend using my fitness pal to track foods  Follow-up as needed  Review of Systems   Constitutional: Negative for activity change, appetite change, chills and fever  HENT: Negative for ear pain and sore throat  Eyes: Negative for pain and visual disturbance  Respiratory: Negative for cough, chest tightness, shortness of breath and wheezing  Cardiovascular: Negative for chest pain and palpitations  Gastrointestinal: Negative for abdominal pain, constipation, diarrhea, nausea and vomiting  "  Genitourinary: Negative for dysuria and hematuria  Musculoskeletal: Negative for arthralgias and back pain  Skin: Negative for color change and rash  Neurological: Negative for seizures and syncope  All other systems reviewed and are negative  Current Outpatient Medications on File Prior to Visit   Medication Sig   • bimatoprost (LUMIGAN) 0 01 % ophthalmic drops Administer 1 drop to both eyes daily at bedtime   • Blood Pressure Monitoring (Blood Pressure Cuff) MISC Use daily   • cetirizine (ZyrTEC) 10 mg tablet Take 10 mg by mouth as needed for allergies   • timolol (TIMOPTIC) 0 5 % ophthalmic solution INSTILL 1 DROP INTO EACH EYE IN THE MORNING   • [DISCONTINUED] losartan-hydrochlorothiazide (HYZAAR) 100-25 MG per tablet Take 1 tablet by mouth daily   • [DISCONTINUED] aspirin (ECOTRIN LOW STRENGTH) 81 mg EC tablet Take 1 tablet (81 mg total) by mouth 2 (two) times a day for 14 days   • [DISCONTINUED] naproxen (Naprosyn) 500 mg tablet Take 1 tablet (500 mg total) by mouth 2 (two) times a day with meals for 5 days (Patient not taking: Reported on 3/24/2023)   • [DISCONTINUED] ondansetron (ZOFRAN) 4 mg tablet Take 1 tablet (4 mg total) by mouth every 8 (eight) hours as needed for nausea or vomiting (Patient not taking: Reported on 3/24/2023)       Objective     /70 (BP Location: Left arm, Patient Position: Sitting)   Pulse 70   Temp 97 7 °F (36 5 °C) (Tympanic)   Ht 5' 4\" (1 626 m)   Wt 94 5 kg (208 lb 6 4 oz)   LMP 11/02/2020 (Exact Date)   SpO2 98%   BMI 35 77 kg/m²     Physical Exam  Vitals and nursing note reviewed  Constitutional:       General: She is not in acute distress  Appearance: Normal appearance  She is not ill-appearing or toxic-appearing  Cardiovascular:      Rate and Rhythm: Normal rate and regular rhythm  Pulses: Normal pulses  Heart sounds: Normal heart sounds  No murmur heard  No friction rub  No gallop     Pulmonary:      Effort: Pulmonary effort is " normal  No respiratory distress  Breath sounds: Normal breath sounds  No stridor  No wheezing or rales  Musculoskeletal:      Cervical back: Normal range of motion  No rigidity  Right lower leg: No edema  Left lower leg: No edema  Lymphadenopathy:      Cervical: No cervical adenopathy  Skin:     General: Skin is warm and dry  Coloration: Skin is not jaundiced  Findings: No rash  Neurological:      General: No focal deficit present  Mental Status: She is alert and oriented to person, place, and time  Mental status is at baseline  Motor: No weakness  Gait: Gait normal    Psychiatric:         Mood and Affect: Mood normal          Behavior: Behavior normal          Thought Content:  Thought content normal          Judgment: Judgment normal        KEO Stapleton

## 2023-07-11 ENCOUNTER — TELEPHONE (OUTPATIENT)
Age: 58
End: 2023-07-11

## 2023-07-11 NOTE — TELEPHONE ENCOUNTER
Patients GI provider:  Dr. Yariel Morales    Number to return call: [phone]    Reason for call: Pt calling to schedule colonoscopy. Last was 10/11/2018. She will call next month to schedule for October.     Scheduled procedure/appointment date if applicable: Apt/procedure NA

## 2023-07-25 ENCOUNTER — APPOINTMENT (OUTPATIENT)
Dept: LAB | Facility: HOSPITAL | Age: 58
End: 2023-07-25
Payer: COMMERCIAL

## 2023-07-25 DIAGNOSIS — I10 PRIMARY HYPERTENSION: ICD-10-CM

## 2023-07-25 DIAGNOSIS — R73.03 PREDIABETES: ICD-10-CM

## 2023-07-25 DIAGNOSIS — Z13.29 THYROID DISORDER SCREENING: ICD-10-CM

## 2023-07-25 DIAGNOSIS — Z13.220 SCREENING CHOLESTEROL LEVEL: ICD-10-CM

## 2023-07-25 LAB
ALBUMIN SERPL BCP-MCNC: 4.4 G/DL (ref 3.5–5)
ALP SERPL-CCNC: 61 U/L (ref 34–104)
ALT SERPL W P-5'-P-CCNC: 28 U/L (ref 7–52)
ANION GAP SERPL CALCULATED.3IONS-SCNC: 10 MMOL/L
AST SERPL W P-5'-P-CCNC: 26 U/L (ref 13–39)
BASOPHILS # BLD AUTO: 0.01 THOUSANDS/ÂΜL (ref 0–0.1)
BASOPHILS NFR BLD AUTO: 0 % (ref 0–1)
BILIRUB SERPL-MCNC: 0.64 MG/DL (ref 0.2–1)
BUN SERPL-MCNC: 16 MG/DL (ref 5–25)
CALCIUM SERPL-MCNC: 9.6 MG/DL (ref 8.4–10.2)
CHLORIDE SERPL-SCNC: 97 MMOL/L (ref 96–108)
CHOLEST SERPL-MCNC: 223 MG/DL
CO2 SERPL-SCNC: 29 MMOL/L (ref 21–32)
CREAT SERPL-MCNC: 0.86 MG/DL (ref 0.6–1.3)
EOSINOPHIL # BLD AUTO: 0.05 THOUSAND/ÂΜL (ref 0–0.61)
EOSINOPHIL NFR BLD AUTO: 1 % (ref 0–6)
ERYTHROCYTE [DISTWIDTH] IN BLOOD BY AUTOMATED COUNT: 11.9 % (ref 11.6–15.1)
EST. AVERAGE GLUCOSE BLD GHB EST-MCNC: 123 MG/DL
GFR SERPL CREATININE-BSD FRML MDRD: 75 ML/MIN/1.73SQ M
GLUCOSE P FAST SERPL-MCNC: 108 MG/DL (ref 65–99)
HBA1C MFR BLD: 5.9 %
HCT VFR BLD AUTO: 39.3 % (ref 34.8–46.1)
HDLC SERPL-MCNC: 48 MG/DL
HGB BLD-MCNC: 13.4 G/DL (ref 11.5–15.4)
IMM GRANULOCYTES # BLD AUTO: 0.02 THOUSAND/UL (ref 0–0.2)
IMM GRANULOCYTES NFR BLD AUTO: 0 % (ref 0–2)
LDLC SERPL CALC-MCNC: 149 MG/DL (ref 0–100)
LYMPHOCYTES # BLD AUTO: 1.75 THOUSANDS/ÂΜL (ref 0.6–4.47)
LYMPHOCYTES NFR BLD AUTO: 32 % (ref 14–44)
MCH RBC QN AUTO: 31.8 PG (ref 26.8–34.3)
MCHC RBC AUTO-ENTMCNC: 34.1 G/DL (ref 31.4–37.4)
MCV RBC AUTO: 93 FL (ref 82–98)
MONOCYTES # BLD AUTO: 0.42 THOUSAND/ÂΜL (ref 0.17–1.22)
MONOCYTES NFR BLD AUTO: 8 % (ref 4–12)
NEUTROPHILS # BLD AUTO: 3.18 THOUSANDS/ÂΜL (ref 1.85–7.62)
NEUTS SEG NFR BLD AUTO: 59 % (ref 43–75)
NONHDLC SERPL-MCNC: 175 MG/DL
NRBC BLD AUTO-RTO: 0 /100 WBCS
PLATELET # BLD AUTO: 320 THOUSANDS/UL (ref 149–390)
PMV BLD AUTO: 9.1 FL (ref 8.9–12.7)
POTASSIUM SERPL-SCNC: 2.8 MMOL/L (ref 3.5–5.3)
PROT SERPL-MCNC: 7.6 G/DL (ref 6.4–8.4)
RBC # BLD AUTO: 4.21 MILLION/UL (ref 3.81–5.12)
SODIUM SERPL-SCNC: 136 MMOL/L (ref 135–147)
TRIGL SERPL-MCNC: 128 MG/DL
TSH SERPL DL<=0.05 MIU/L-ACNC: 3.95 UIU/ML (ref 0.45–4.5)
WBC # BLD AUTO: 5.43 THOUSAND/UL (ref 4.31–10.16)

## 2023-07-25 PROCEDURE — 80053 COMPREHEN METABOLIC PANEL: CPT

## 2023-07-25 PROCEDURE — 85025 COMPLETE CBC W/AUTO DIFF WBC: CPT

## 2023-07-25 PROCEDURE — 36415 COLL VENOUS BLD VENIPUNCTURE: CPT

## 2023-07-25 PROCEDURE — 84443 ASSAY THYROID STIM HORMONE: CPT

## 2023-07-25 PROCEDURE — 80061 LIPID PANEL: CPT

## 2023-07-25 PROCEDURE — 83036 HEMOGLOBIN GLYCOSYLATED A1C: CPT

## 2023-07-26 DIAGNOSIS — E87.6 HYPOKALEMIA: Primary | ICD-10-CM

## 2023-07-26 DIAGNOSIS — I10 PRIMARY HYPERTENSION: ICD-10-CM

## 2023-07-26 RX ORDER — LOSARTAN POTASSIUM 100 MG/1
100 TABLET ORAL DAILY
Qty: 90 TABLET | Refills: 3 | Status: SHIPPED | OUTPATIENT
Start: 2023-07-26

## 2023-07-26 RX ORDER — POTASSIUM CHLORIDE 20 MEQ/1
40 TABLET, EXTENDED RELEASE ORAL DAILY
Qty: 30 TABLET | Refills: 0 | Status: SHIPPED | OUTPATIENT
Start: 2023-07-26 | End: 2023-08-28

## 2023-07-27 ENCOUNTER — HOSPITAL ENCOUNTER (OUTPATIENT)
Dept: BONE DENSITY | Facility: HOSPITAL | Age: 58
End: 2023-07-27
Payer: COMMERCIAL

## 2023-07-27 VITALS — WEIGHT: 208.34 LBS | HEIGHT: 64 IN | BODY MASS INDEX: 35.57 KG/M2

## 2023-07-27 DIAGNOSIS — Z78.0 POST-MENOPAUSAL: ICD-10-CM

## 2023-07-27 PROCEDURE — 77080 DXA BONE DENSITY AXIAL: CPT

## 2023-07-31 ENCOUNTER — HOSPITAL ENCOUNTER (OUTPATIENT)
Dept: MAMMOGRAPHY | Facility: HOSPITAL | Age: 58
Discharge: HOME | End: 2023-07-31
Attending: OBSTETRICS & GYNECOLOGY
Payer: COMMERCIAL

## 2023-07-31 DIAGNOSIS — Z12.31 ENCOUNTER FOR SCREENING MAMMOGRAM FOR MALIGNANT NEOPLASM OF BREAST: ICD-10-CM

## 2023-07-31 PROCEDURE — 77063 BREAST TOMOSYNTHESIS BI: CPT

## 2023-07-31 PROCEDURE — 77067 SCR MAMMO BI INCL CAD: CPT

## 2023-08-10 ENCOUNTER — TELEPHONE (OUTPATIENT)
Age: 58
End: 2023-08-10

## 2023-08-10 NOTE — TELEPHONE ENCOUNTER
Patient failed OA. Patient is experiencing uncontrolled blood pressure issues and medications are being altered.

## 2023-08-14 ENCOUNTER — TELEPHONE (OUTPATIENT)
Dept: OBGYN CLINIC | Facility: CLINIC | Age: 58
End: 2023-08-14

## 2023-08-24 ENCOUNTER — APPOINTMENT (OUTPATIENT)
Dept: LAB | Facility: HOSPITAL | Age: 58
End: 2023-08-24
Payer: COMMERCIAL

## 2023-08-24 DIAGNOSIS — E87.6 HYPOKALEMIA: ICD-10-CM

## 2023-08-24 DIAGNOSIS — I10 PRIMARY HYPERTENSION: ICD-10-CM

## 2023-08-24 LAB
ANION GAP SERPL CALCULATED.3IONS-SCNC: 8 MMOL/L
BUN SERPL-MCNC: 16 MG/DL (ref 5–25)
CALCIUM SERPL-MCNC: 9.2 MG/DL (ref 8.4–10.2)
CHLORIDE SERPL-SCNC: 106 MMOL/L (ref 96–108)
CO2 SERPL-SCNC: 25 MMOL/L (ref 21–32)
CREAT SERPL-MCNC: 0.81 MG/DL (ref 0.6–1.3)
GFR SERPL CREATININE-BSD FRML MDRD: 81 ML/MIN/1.73SQ M
GLUCOSE P FAST SERPL-MCNC: 101 MG/DL (ref 65–99)
POTASSIUM SERPL-SCNC: 4 MMOL/L (ref 3.5–5.3)
SODIUM SERPL-SCNC: 139 MMOL/L (ref 135–147)

## 2023-08-24 PROCEDURE — 80048 BASIC METABOLIC PNL TOTAL CA: CPT

## 2023-08-24 PROCEDURE — 36415 COLL VENOUS BLD VENIPUNCTURE: CPT

## 2023-08-28 ENCOUNTER — OFFICE VISIT (OUTPATIENT)
Dept: FAMILY MEDICINE CLINIC | Facility: CLINIC | Age: 58
End: 2023-08-28
Payer: COMMERCIAL

## 2023-08-28 VITALS
OXYGEN SATURATION: 97 % | HEIGHT: 64 IN | HEART RATE: 69 BPM | BODY MASS INDEX: 36.02 KG/M2 | TEMPERATURE: 98.2 F | DIASTOLIC BLOOD PRESSURE: 72 MMHG | SYSTOLIC BLOOD PRESSURE: 124 MMHG | WEIGHT: 211 LBS

## 2023-08-28 DIAGNOSIS — E87.6 HYPOKALEMIA: ICD-10-CM

## 2023-08-28 DIAGNOSIS — I10 PRIMARY HYPERTENSION: Primary | ICD-10-CM

## 2023-08-28 PROCEDURE — 99213 OFFICE O/P EST LOW 20 MIN: CPT | Performed by: NURSE PRACTITIONER

## 2023-08-28 RX ORDER — THIAMINE HCL 100 MG
TABLET ORAL
COMMUNITY

## 2023-08-28 NOTE — PROGRESS NOTES
Name: Viral Rivas      : 1965      MRN: [id number]  Encounter Provider: KEO Valenzuela  Encounter Date: 2023   Encounter department: [address] [place] [address]     1. Primary hypertension  -     Basic metabolic panel; Future           Subjective      Patient presents for follow up on HTN after stopping HCTZ due to K of 2.8. She did start otc potassium which she is instructed not to stop as her K is 4. Will repeat BMP in 6-8 weeks. She has been on losartan 100 mg daily and her BP has been well controlled <140/90. She has been monitoring, log scanned into chart. no symptoms. Will continue to monitor BP. No swelling since d/c hctz   dXA scan aniya- recommend daily Ca and Vit d which she is taking in a multivitamin. Review of Systems   Constitutional: Negative for appetite change, fatigue and unexpected weight change. HENT: Negative for congestion, rhinorrhea, sneezing and sore throat. Eyes: Negative for visual disturbance. Respiratory: Negative for cough, chest tightness, shortness of breath and wheezing. Cardiovascular: Negative for chest pain, palpitations and leg swelling. Gastrointestinal: Negative for abdominal pain, blood in stool, constipation, diarrhea, nausea and vomiting. Genitourinary: Negative for difficulty urinating, dysuria and urgency. Musculoskeletal: Negative for arthralgias, back pain and joint swelling. Skin: Negative for color change and rash. Neurological: Negative for dizziness, weakness and headaches. Hematological: Negative for adenopathy. Does not bruise/bleed easily.        Current Outpatient Medications on File Prior to Visit   Medication Sig   • bimatoprost (LUMIGAN) 0.01 % ophthalmic drops Administer 1 drop to both eyes daily at bedtime   • Blood Pressure Monitoring (Blood Pressure Cuff) MISC Use daily   • Calcium Citrate-Vitamin D3 315-6.25 MG-MCG TABS Take by mouth   • cetirizine (ZyrTEC) 10 mg tablet Take 10 mg by mouth as needed for allergies   • losartan (Cozaar) 100 MG tablet Take 1 tablet (100 mg total) by mouth daily   • Potassium 99 MG TABS Take by mouth   • timolol (TIMOPTIC) 0.5 % ophthalmic solution INSTILL 1 DROP INTO EACH EYE IN THE MORNING   • [DISCONTINUED] potassium chloride (K-DUR,KLOR-CON) 20 mEq tablet Take 2 tablets (40 mEq total) by mouth daily (Patient not taking: Reported on 8/28/2023)       Objective     /72 (BP Location: Left arm, Patient Position: Sitting, Cuff Size: Standard)   Pulse 69   Temp 98.2 °F (36.8 °C) (Tympanic)   Ht 5' 4" (1.626 m)   Wt 95.7 kg (211 lb)   LMP 11/02/2020 (Exact Date)   SpO2 97%   BMI 36.22 kg/m²     Physical Exam  Vitals and nursing note reviewed. Constitutional:       General: She is not in acute distress. Appearance: Normal appearance. She is not ill-appearing or toxic-appearing. Cardiovascular:      Rate and Rhythm: Normal rate and regular rhythm. Pulses: Normal pulses. Heart sounds: Normal heart sounds. No murmur heard. No friction rub. No gallop. Pulmonary:      Effort: Pulmonary effort is normal. No respiratory distress. Breath sounds: Normal breath sounds. No stridor. No wheezing or rales. Musculoskeletal:      Cervical back: Normal range of motion. No rigidity. Right lower leg: No edema. Left lower leg: No edema. Lymphadenopathy:      Cervical: No cervical adenopathy. Skin:     General: Skin is warm and dry. Coloration: Skin is not jaundiced. Findings: No rash. Neurological:      General: No focal deficit present. Mental Status: She is alert and oriented to person, place, and time. Mental status is at baseline. Motor: No weakness. Gait: Gait normal.   Psychiatric:         Mood and Affect: Mood normal.         Behavior: Behavior normal.         Thought Content:  Thought content normal.         Judgment: Judgment normal.       KEO Joshi

## 2023-09-13 ENCOUNTER — OFFICE VISIT (OUTPATIENT)
Age: 58
End: 2023-09-13
Payer: COMMERCIAL

## 2023-09-13 VITALS
TEMPERATURE: 98.2 F | HEART RATE: 74 BPM | HEIGHT: 64 IN | WEIGHT: 209.8 LBS | BODY MASS INDEX: 35.82 KG/M2 | SYSTOLIC BLOOD PRESSURE: 122 MMHG | OXYGEN SATURATION: 99 % | DIASTOLIC BLOOD PRESSURE: 64 MMHG

## 2023-09-13 DIAGNOSIS — Z86.010 HISTORY OF COLON POLYPS: ICD-10-CM

## 2023-09-13 DIAGNOSIS — K62.5 BRIGHT RED BLOOD PER RECTUM: Primary | ICD-10-CM

## 2023-09-13 DIAGNOSIS — R12 HEARTBURN: ICD-10-CM

## 2023-09-13 DIAGNOSIS — Z90.710 S/P LAPAROSCOPIC HYSTERECTOMY: ICD-10-CM

## 2023-09-13 DIAGNOSIS — Z12.11 COLON CANCER SCREENING: ICD-10-CM

## 2023-09-13 PROBLEM — Z86.0100 HISTORY OF COLON POLYPS: Status: ACTIVE | Noted: 2023-09-13

## 2023-09-13 PROCEDURE — 99244 OFF/OP CNSLTJ NEW/EST MOD 40: CPT | Performed by: STUDENT IN AN ORGANIZED HEALTH CARE EDUCATION/TRAINING PROGRAM

## 2023-09-13 RX ORDER — POLYETHYLENE GLYCOL 3350 17 G/17G
238 POWDER, FOR SOLUTION ORAL ONCE
Qty: 238 G | Refills: 0 | Status: SHIPPED | OUTPATIENT
Start: 2023-09-13 | End: 2023-09-13

## 2023-09-13 RX ORDER — BISACODYL 5 MG/1
20 TABLET, DELAYED RELEASE ORAL ONCE
Qty: 4 TABLET | Refills: 0 | Status: SHIPPED | OUTPATIENT
Start: 2023-09-13 | End: 2023-09-13

## 2023-09-13 NOTE — H&P (VIEW-ONLY)
[facility]  Outpatient Consultation  Encounter: [id number]     PATIENT INFO     Name: Narda Velasco  YOB: 1965   Age: 65 y.o. Sex: female   MRN: [id number]    [address]     Narda Velasco is a 65 y.o. female with BRBPR, history of colon polyps and need for colon cancer screening, and intermittent mild heartburn. Last colonoscopy was in 2018 with 2 subcentimeter polyps removed from a sending and sigmoid colon. Ascending colon polyp was SSA. Sigmoid polyp was hyperplastic. Small internal hemorrhoids were noted. Was recommended repeat colonoscopy in 5 years which she is due for now. Does have episodes of BRBPR mainly upon wiping likely from outlet bleeding related to hemorrhoids. No other alarm symptoms. Family history is unknown as patient was adopted. Not on antiplatelets or anticoagulants. Has had episodic heartburn typically related to diet. Has only been going on for the last year or so. Does not take any acid suppression medications regularly. No alarm symptoms. History of hysterectomy noted. No other abdominal surgeries. • Schedule for colonoscopy  • MiraLAX/Dulcolax bowel prep  • Advised dietary fiber supplementation which patient has been doing which has helped  • Encourage water intake  • No plan for upper endoscopy evaluation at this time as her heartburn symptoms are mild and infrequent  • If symptoms worsen, can consider starting acid suppression therapy with either H2 blocker or PPI and consider endoscopic evaluation    1. Bright red blood per rectum    2. Colon cancer screening    3. History of colon polyps    4. Heartburn    5.  S/P laparoscopic hysterectomy      Orders Placed This Encounter   Procedures   • Colonoscopy       FOLLOW-UP: As needed    HISTORY OF PRESENT ILLNESS       Narda Velasco is a 65 y.o. female who presents to GI office for history of colon polyps and need for colon cancer screening, episodes of bright red blood per rectum mainly upon wiping, and intermittent mild heartburn. Patient's last colonoscopy was in 2018 with findings of medium sized polyp in the ascending colon (SSA) and small polyp in the sigmoid colon (hyperplastic) as well as small internal hemorrhoids. She was recommended repeat colonoscopy in 5 years which she is due for now. She does admit to episodes of bright red blood mainly upon wiping. This is relatively infrequent and likely related to hemorrhoids which were seen on previous colonoscopy. She has started taking dietary fiber which has helped regulate her bowels. She has otherwise normal formed stools regularly. Denies any other alarm symptoms including black stools, change in bowel habits, unintentional weight loss, abdominal pain. Family history is unknown as patient was adopted. Not on antiplatelets or anticoagulants. Has had hysterectomy but no other abdominal surgeries. Does admit to mild episodic heartburn which she attributes to certain food items. Does not regularly take any medications for acid reflux. No other alarm symptoms including dysphagia, odynophagia, nausea, vomiting, regurgitation.      ENDOSCOPIC HISTORY     UPPER ENDOSCOPY: None    COLONOSCOPY: 2018 with medium sized ascending colon polyp (SSA) and a small polyp in the sigmoid colon (hyperplastic), and internal hemorrhoids; recommended repeat in 5 years    REVIEW OF SYSTEMS     CONSTITUTIONAL: Denies any fever, chills, rigors, and weight loss  HEENT: No earache or tinnitus, denies hearing loss or visual disturbances  CARDIOVASCULAR: No chest pain or palpitations  RESPIRATORY: Denies any cough, hemoptysis, shortness of breath or dyspnea on exertion  GASTROINTESTINAL: As noted in the History of Present Illness  GENITOURINARY: No problems with urination, denies any hematuria or dysuria  NEUROLOGIC: No dizziness or vertigo, denies headaches   MUSCULOSKELETAL: Denies any muscle or joint pain   SKIN: Denies skin rashes or itching  ENDOCRINE: Denies excessive thirst, denies intolerance to heat or cold  PSYCHOSOCIAL: Denies depression or anxiety, denies any recent memory loss     Historical Information   Past Medical History:   Diagnosis Date   • Abnormal uterine bleeding    • Class 1 obesity due to excess calories in adult 12/09/2020   • Contact lens overwear of both eyes    • Glaucoma     borderline recently started on lumigan    • Hypertension    • Motion sickness    • Seasonal allergies    • Uterine fibroid    • Wears glasses      Past Surgical History:   Procedure Laterality Date   • BREAST BIOPSY Right 15yrs benign   • CYSTOSCOPY N/A 12/9/2020    Procedure: CYSTOSCOPY;  Surgeon: Moshe English MD;  [facility];  Service: Gynecology   • HYSTERECTOMY     • DC COLONOSCOPY FLX DX W/COLLJ SPEC WHEN PFRMD N/A 10/9/2018    Procedure: COLONOSCOPY;  Surgeon: Leni Patterson MD;  [facility];  Service: Gastroenterology   • DC LAPAROSCOPY W TOTAL HYSTERECTOMY UTERUS 250 GM/< N/A 12/9/2020    Procedure: LAP TOTAL HYSTERECTOMY;  Surgeon: Moshe English MD;  [facility];  Service: Gynecology   • DC LAPAROSCOPY W/RMVL ADNEXAL STRUCTURES Bilateral 12/9/2020    Procedure: SALPINGECTOMY;  Surgeon: Moshe English MD;  [facility];  Service: Gynecology   • DC OPEN TX TRIMALLEOLAR ANKLE FX W/FIXJ PST LIP Right 3/13/2023    Procedure: OPEN REDUCTION W/ INTERNAL FIXATION (ORIF) ANKLE;  Surgeon: Otilio Willis DO;  [facility];  Service: Orthopedics   • TUBAL LIGATION     • VAGINAL DELIVERY      x3     Social History   Social History     Substance and Sexual Activity   Alcohol Use Not Currently    Comment: 1 or 2 alcoholic  beverages on holidays     Social History     Substance and Sexual Activity   Drug Use Never     Social History     Tobacco Use   Smoking Status Never   Smokeless Tobacco Never     Family History   Adopted: Yes   Problem Relation Age of Onset   • Hypothyroidism Daughter    • Irritable bowel syndrome Daughter    • Irritable bowel syndrome Son        MEDICATIONS & ALLERGIES     Current Outpatient Medications   Medication Instructions   • bimatoprost (LUMIGAN) 0.01 % ophthalmic drops 1 drop, Daily at bedtime   • bisacodyl (DULCOLAX) 20 mg, Oral, Once, Take as directed for colonoscopy. • Blood Pressure Monitoring (Blood Pressure Cuff) MISC Does not apply, Daily   • Calcium Citrate-Vitamin D3 315-6.25 MG-MCG TABS Oral   • cetirizine (ZYRTEC) 10 mg, As needed   • losartan (COZAAR) 100 mg, Oral, Daily   • polyethylene glycol (MIRALAX) 238 g, Oral, Once, Take 238 g my mouth. Use as directed   • timolol (TIMOPTIC) 0.5 % ophthalmic solution INSTILL 1 DROP INTO EACH EYE IN THE MORNING     No Known Allergies    PHYSICAL EXAM      Objective   Blood pressure 122/64, pulse 74, temperature 98.2 °F (36.8 °C), temperature source Temporal, height 5' 4" (1.626 m), weight 95.2 kg (209 lb 12.8 oz), last menstrual period 11/02/2020, SpO2 99 %, not currently breastfeeding. Body mass index is 36.01 kg/m². General Appearance:   Alert, cooperative, no distress   HEENT:   Normocephalic, atraumatic, anicteric     Neck:   Supple, symmetrical, trachea midline   Lungs:   Equal chest rise, respirations unlabored    Heart:   Regular rate and rhythm   Abdomen:   Soft, non-tender, non-distended; normal bowel sounds; no masses, no organomegaly    Rectal:   Deferred    Extremities:   No cyanosis, clubbing or edema    Neuro: Moves all 4 extremities    Skin:   No jaundice, rashes, or lesions       LABORATORY RESULTS     No visits with results within 1 Day(s) from this visit.    Latest known visit with results is:   Appointment on 08/24/2023   Component Date Value   • Sodium 08/24/2023 139    • Potassium 08/24/2023 4.0    • Chloride 08/24/2023 106    • CO2 08/24/2023 25    • ANION GAP 08/24/2023 8    • BUN 08/24/2023 16    • Creatinine 08/24/2023 0.81    • Glucose, Fasting 08/24/2023 101 (H) • Calcium 08/24/2023 9.2    • eGFR 08/24/2023 81         IMAGING RESULTS     No results found. I have personally reviewed pertinent imaging study reports. Adithya Elias D.O. [address] [facility]  Division of Gastroenterology & Hepatology  Available on [email or web address]    ** Please Note: This note is constructed using a voice recognition dictation system.  **

## 2023-09-13 NOTE — PROGRESS NOTES
[facility]  Outpatient Consultation  Encounter: [id number]     PATIENT INFO     Name: Viral Rivas  YOB: 1965   Age: 65 y.o. Sex: female   MRN: [id number]    [address]     Viral Rivas is a 65 y.o. female with BRBPR, history of colon polyps and need for colon cancer screening, and intermittent mild heartburn. Last colonoscopy was in 2018 with 2 subcentimeter polyps removed from a sending and sigmoid colon. Ascending colon polyp was SSA. Sigmoid polyp was hyperplastic. Small internal hemorrhoids were noted. Was recommended repeat colonoscopy in 5 years which she is due for now. Does have episodes of BRBPR mainly upon wiping likely from outlet bleeding related to hemorrhoids. No other alarm symptoms. Family history is unknown as patient was adopted. Not on antiplatelets or anticoagulants. Has had episodic heartburn typically related to diet. Has only been going on for the last year or so. Does not take any acid suppression medications regularly. No alarm symptoms. History of hysterectomy noted. No other abdominal surgeries. • Schedule for colonoscopy  • MiraLAX/Dulcolax bowel prep  • Advised dietary fiber supplementation which patient has been doing which has helped  • Encourage water intake  • No plan for upper endoscopy evaluation at this time as her heartburn symptoms are mild and infrequent  • If symptoms worsen, can consider starting acid suppression therapy with either H2 blocker or PPI and consider endoscopic evaluation    1. Bright red blood per rectum    2. Colon cancer screening    3. History of colon polyps    4. Heartburn    5.  S/P laparoscopic hysterectomy      Orders Placed This Encounter   Procedures   • Colonoscopy       FOLLOW-UP: As needed    HISTORY OF PRESENT ILLNESS       Viral Rivas is a 65 y.o. female who presents to GI office for history of colon polyps and need for colon cancer screening, episodes of bright red blood per rectum mainly upon wiping, and intermittent mild heartburn. Patient's last colonoscopy was in 2018 with findings of medium sized polyp in the ascending colon (SSA) and small polyp in the sigmoid colon (hyperplastic) as well as small internal hemorrhoids. She was recommended repeat colonoscopy in 5 years which she is due for now. She does admit to episodes of bright red blood mainly upon wiping. This is relatively infrequent and likely related to hemorrhoids which were seen on previous colonoscopy. She has started taking dietary fiber which has helped regulate her bowels. She has otherwise normal formed stools regularly. Denies any other alarm symptoms including black stools, change in bowel habits, unintentional weight loss, abdominal pain. Family history is unknown as patient was adopted. Not on antiplatelets or anticoagulants. Has had hysterectomy but no other abdominal surgeries. Does admit to mild episodic heartburn which she attributes to certain food items. Does not regularly take any medications for acid reflux. No other alarm symptoms including dysphagia, odynophagia, nausea, vomiting, regurgitation.      ENDOSCOPIC HISTORY     UPPER ENDOSCOPY: None    COLONOSCOPY: 2018 with medium sized ascending colon polyp (SSA) and a small polyp in the sigmoid colon (hyperplastic), and internal hemorrhoids; recommended repeat in 5 years    REVIEW OF SYSTEMS     CONSTITUTIONAL: Denies any fever, chills, rigors, and weight loss  HEENT: No earache or tinnitus, denies hearing loss or visual disturbances  CARDIOVASCULAR: No chest pain or palpitations  RESPIRATORY: Denies any cough, hemoptysis, shortness of breath or dyspnea on exertion  GASTROINTESTINAL: As noted in the History of Present Illness  GENITOURINARY: No problems with urination, denies any hematuria or dysuria  NEUROLOGIC: No dizziness or vertigo, denies headaches   MUSCULOSKELETAL: Denies any muscle or joint pain   SKIN: Denies skin rashes or itching  ENDOCRINE: Denies excessive thirst, denies intolerance to heat or cold  PSYCHOSOCIAL: Denies depression or anxiety, denies any recent memory loss     Historical Information   Past Medical History:   Diagnosis Date   • Abnormal uterine bleeding    • Class 1 obesity due to excess calories in adult 12/09/2020   • Contact lens overwear of both eyes    • Glaucoma     borderline recently started on lumigan    • Hypertension    • Motion sickness    • Seasonal allergies    • Uterine fibroid    • Wears glasses      Past Surgical History:   Procedure Laterality Date   • BREAST BIOPSY Right 15yrs benign   • CYSTOSCOPY N/A 12/9/2020    Procedure: CYSTOSCOPY;  Surgeon: Kitty Yuan MD;  [facility];  Service: Gynecology   • HYSTERECTOMY     • MD COLONOSCOPY FLX DX W/COLLJ SPEC WHEN PFRMD N/A 10/9/2018    Procedure: COLONOSCOPY;  Surgeon: Kaitlin Sarkar MD;  [facility];  Service: Gastroenterology   • MD LAPAROSCOPY W TOTAL HYSTERECTOMY UTERUS 250 GM/< N/A 12/9/2020    Procedure: LAP TOTAL HYSTERECTOMY;  Surgeon: Kitty Yuan MD;  [facility];  Service: Gynecology   • MD LAPAROSCOPY W/RMVL ADNEXAL STRUCTURES Bilateral 12/9/2020    Procedure: SALPINGECTOMY;  Surgeon: Kitty Yuan MD;  [facility];  Service: Gynecology   • MD OPEN TX TRIMALLEOLAR ANKLE FX W/FIXJ PST LIP Right 3/13/2023    Procedure: OPEN REDUCTION W/ INTERNAL FIXATION (ORIF) ANKLE;  Surgeon: Missouri Apley, DO;  [facility];  Service: Orthopedics   • TUBAL LIGATION     • VAGINAL DELIVERY      x3     Social History   Social History     Substance and Sexual Activity   Alcohol Use Not Currently    Comment: 1 or 2 alcoholic  beverages on holidays     Social History     Substance and Sexual Activity   Drug Use Never     Social History     Tobacco Use   Smoking Status Never   Smokeless Tobacco Never     Family History   Adopted: Yes   Problem Relation Age of Onset   • Hypothyroidism Daughter    • Irritable bowel syndrome Daughter    • Irritable bowel syndrome Son        MEDICATIONS & ALLERGIES     Current Outpatient Medications   Medication Instructions   • bimatoprost (LUMIGAN) 0.01 % ophthalmic drops 1 drop, Daily at bedtime   • bisacodyl (DULCOLAX) 20 mg, Oral, Once, Take as directed for colonoscopy. • Blood Pressure Monitoring (Blood Pressure Cuff) MISC Does not apply, Daily   • Calcium Citrate-Vitamin D3 315-6.25 MG-MCG TABS Oral   • cetirizine (ZYRTEC) 10 mg, As needed   • losartan (COZAAR) 100 mg, Oral, Daily   • polyethylene glycol (MIRALAX) 238 g, Oral, Once, Take 238 g my mouth. Use as directed   • timolol (TIMOPTIC) 0.5 % ophthalmic solution INSTILL 1 DROP INTO EACH EYE IN THE MORNING     No Known Allergies    PHYSICAL EXAM      Objective   Blood pressure 122/64, pulse 74, temperature 98.2 °F (36.8 °C), temperature source Temporal, height 5' 4" (1.626 m), weight 95.2 kg (209 lb 12.8 oz), last menstrual period 11/02/2020, SpO2 99 %, not currently breastfeeding. Body mass index is 36.01 kg/m². General Appearance:   Alert, cooperative, no distress   HEENT:   Normocephalic, atraumatic, anicteric     Neck:   Supple, symmetrical, trachea midline   Lungs:   Equal chest rise, respirations unlabored    Heart:   Regular rate and rhythm   Abdomen:   Soft, non-tender, non-distended; normal bowel sounds; no masses, no organomegaly    Rectal:   Deferred    Extremities:   No cyanosis, clubbing or edema    Neuro: Moves all 4 extremities    Skin:   No jaundice, rashes, or lesions       LABORATORY RESULTS     No visits with results within 1 Day(s) from this visit.    Latest known visit with results is:   Appointment on 08/24/2023   Component Date Value   • Sodium 08/24/2023 139    • Potassium 08/24/2023 4.0    • Chloride 08/24/2023 106    • CO2 08/24/2023 25    • ANION GAP 08/24/2023 8    • BUN 08/24/2023 16    • Creatinine 08/24/2023 0.81    • Glucose, Fasting 08/24/2023 101 (H) • Calcium 08/24/2023 9.2    • eGFR 08/24/2023 81         IMAGING RESULTS     No results found. I have personally reviewed pertinent imaging study reports. Sophia Cannon D.O. [address] [facility]  Division of Gastroenterology & Hepatology  Available on [email or web address]    ** Please Note: This note is constructed using a voice recognition dictation system.  **

## 2023-10-12 ENCOUNTER — ANESTHESIA EVENT (OUTPATIENT)
Dept: GASTROENTEROLOGY | Facility: HOSPITAL | Age: 58
End: 2023-10-12

## 2023-10-12 ENCOUNTER — ANESTHESIA (OUTPATIENT)
Dept: GASTROENTEROLOGY | Facility: HOSPITAL | Age: 58
End: 2023-10-12

## 2023-10-12 ENCOUNTER — HOSPITAL ENCOUNTER (OUTPATIENT)
Dept: GASTROENTEROLOGY | Facility: HOSPITAL | Age: 58
Setting detail: OUTPATIENT SURGERY
End: 2023-10-12
Attending: STUDENT IN AN ORGANIZED HEALTH CARE EDUCATION/TRAINING PROGRAM
Payer: COMMERCIAL

## 2023-10-12 VITALS
HEIGHT: 64 IN | SYSTOLIC BLOOD PRESSURE: 146 MMHG | RESPIRATION RATE: 18 BRPM | TEMPERATURE: 97.2 F | HEART RATE: 60 BPM | OXYGEN SATURATION: 100 % | DIASTOLIC BLOOD PRESSURE: 77 MMHG | WEIGHT: 209 LBS | BODY MASS INDEX: 35.68 KG/M2

## 2023-10-12 DIAGNOSIS — Z12.11 COLON CANCER SCREENING: ICD-10-CM

## 2023-10-12 DIAGNOSIS — Z86.010 HISTORY OF COLON POLYPS: ICD-10-CM

## 2023-10-12 PROCEDURE — 88305 TISSUE EXAM BY PATHOLOGIST: CPT | Performed by: PATHOLOGY

## 2023-10-12 RX ORDER — SODIUM CHLORIDE, SODIUM LACTATE, POTASSIUM CHLORIDE, CALCIUM CHLORIDE 600; 310; 30; 20 MG/100ML; MG/100ML; MG/100ML; MG/100ML
125 INJECTION, SOLUTION INTRAVENOUS CONTINUOUS
Status: CANCELLED | OUTPATIENT
Start: 2023-10-12

## 2023-10-12 RX ORDER — LIDOCAINE HYDROCHLORIDE 10 MG/ML
INJECTION, SOLUTION EPIDURAL; INFILTRATION; INTRACAUDAL; PERINEURAL AS NEEDED
Status: DISCONTINUED | OUTPATIENT
Start: 2023-10-12 | End: 2023-10-12

## 2023-10-12 RX ORDER — PROPOFOL 10 MG/ML
INJECTION, EMULSION INTRAVENOUS AS NEEDED
Status: DISCONTINUED | OUTPATIENT
Start: 2023-10-12 | End: 2023-10-12

## 2023-10-12 RX ORDER — SODIUM CHLORIDE, SODIUM LACTATE, POTASSIUM CHLORIDE, CALCIUM CHLORIDE 600; 310; 30; 20 MG/100ML; MG/100ML; MG/100ML; MG/100ML
125 INJECTION, SOLUTION INTRAVENOUS CONTINUOUS
Status: DISCONTINUED | OUTPATIENT
Start: 2023-10-12 | End: 2023-10-16 | Stop reason: HOSPADM

## 2023-10-12 RX ADMIN — PROPOFOL 50 MG: 10 INJECTION, EMULSION INTRAVENOUS at 11:39

## 2023-10-12 RX ADMIN — PROPOFOL 50 MG: 10 INJECTION, EMULSION INTRAVENOUS at 11:44

## 2023-10-12 RX ADMIN — LIDOCAINE HYDROCHLORIDE 50 MG: 10 INJECTION, SOLUTION EPIDURAL; INFILTRATION; INTRACAUDAL; PERINEURAL at 11:34

## 2023-10-12 RX ADMIN — PROPOFOL 50 MG: 10 INJECTION, EMULSION INTRAVENOUS at 11:46

## 2023-10-12 RX ADMIN — PROPOFOL 50 MG: 10 INJECTION, EMULSION INTRAVENOUS at 11:37

## 2023-10-12 RX ADMIN — PROPOFOL 40 MG: 10 INJECTION, EMULSION INTRAVENOUS at 11:49

## 2023-10-12 RX ADMIN — PROPOFOL 50 MG: 10 INJECTION, EMULSION INTRAVENOUS at 11:36

## 2023-10-12 RX ADMIN — PROPOFOL 50 MG: 10 INJECTION, EMULSION INTRAVENOUS at 11:42

## 2023-10-12 RX ADMIN — PROPOFOL 100 MG: 10 INJECTION, EMULSION INTRAVENOUS at 11:34

## 2023-10-12 RX ADMIN — SODIUM CHLORIDE, SODIUM LACTATE, POTASSIUM CHLORIDE, AND CALCIUM CHLORIDE 125 ML/HR: .6; .31; .03; .02 INJECTION, SOLUTION INTRAVENOUS at 10:59

## 2023-10-12 NOTE — ANESTHESIA POSTPROCEDURE EVALUATION
Post-Op Assessment Note    CV Status:  Stable    Pain management: adequate     Mental Status:  Awake, sleepy and arousable   Hydration Status:  Euvolemic   PONV Controlled:  Controlled   Airway Patency:  Patent      Post Op Vitals Reviewed: Yes      Staff: CRNA, Anesthesiologist         There were no known notable events for this encounter.     BP   141/82   Temp     Pulse  79   Resp   16   SpO2   94

## 2023-10-12 NOTE — ANESTHESIA PREPROCEDURE EVALUATION
Procedure:  COLONOSCOPY    Relevant Problems   CARDIO   (+) Primary hypertension      GI/HEPATIC   (+) Bright red blood per rectum        Physical Exam    Airway    Mallampati score: I         Dental   No notable dental hx     Cardiovascular  Rhythm: regular, Rate: normal    Pulmonary   Breath sounds clear to auscultation    Other Findings        Anesthesia Plan  ASA Score- 2     Anesthesia Type- IV sedation with anesthesia with ASA Monitors. Additional Monitors:     Airway Plan:            Plan Factors-Exercise tolerance (METS): >4 METS. Chart reviewed. Patient summary reviewed. Patient is not a current smoker. Induction-     Postoperative Plan-     Informed Consent- Anesthetic plan and risks discussed with patient. I personally reviewed this patient with the CRNA. Discussed and agreed on the Anesthesia Plan with the CRNA. Cici Roblero

## 2023-10-12 NOTE — INTERVAL H&P NOTE
H&P reviewed. After examining the patient I find no changes in the patients condition since the H&P had been written.     Vitals:    10/12/23 1043   BP: 150/82   Pulse: 66   Resp: 18   Temp: (!) 97.2 °F (36.2 °C)   SpO2: 96% URINE SENT

## 2023-10-16 PROCEDURE — 88305 TISSUE EXAM BY PATHOLOGIST: CPT | Performed by: PATHOLOGY

## 2023-11-07 ENCOUNTER — ANNUAL EXAM (OUTPATIENT)
Dept: OBGYN CLINIC | Facility: MEDICAL CENTER | Age: 58
End: 2023-11-07
Payer: COMMERCIAL

## 2023-11-07 VITALS
SYSTOLIC BLOOD PRESSURE: 120 MMHG | HEIGHT: 64 IN | WEIGHT: 211 LBS | BODY MASS INDEX: 36.02 KG/M2 | DIASTOLIC BLOOD PRESSURE: 72 MMHG

## 2023-11-07 DIAGNOSIS — Z01.419 ENCOUNTER FOR GYNECOLOGICAL EXAMINATION: Primary | ICD-10-CM

## 2023-11-07 DIAGNOSIS — Z90.710 S/P LAPAROSCOPIC HYSTERECTOMY: ICD-10-CM

## 2023-11-07 PROCEDURE — S0612 ANNUAL GYNECOLOGICAL EXAMINA: HCPCS | Performed by: OBSTETRICS & GYNECOLOGY

## 2023-11-07 NOTE — PROGRESS NOTES
ASSESSMENT & PLAN: Memo Mehta is a 65 y.o. [id number] with normal gynecologic exam.    1.  Routine well woman exam done today  2. Pap and HPV:  The patient's last pap and hpv was 2020. It was normal.    Pap with cotesting was not done today. Current ASCCP Guidelines reviewed. Hysterectomy    3. Mammogram up to date  4. Colorectal cancer screening was not ordered. 5. The following were reviewed in today's visit: breast self exam, menopause, exercise, and healthy diet      CC:  Annual Gynecologic Examination    HPI: Memo Mehta is a 65 y.o. [id number] who presents for annual gynecologic examination. She has the following concerns:  none      Health Maintenance:    She wears her seatbelt routinely. She does perform regular monthly self breast exams. She feels safe at home.      Past Medical History:   Diagnosis Date    Abnormal uterine bleeding     Class 1 obesity due to excess calories in adult 12/09/2020    Colon polyp     Contact lens overwear of both eyes     Glaucoma     borderline recently started on lumigan     Hypertension     Motion sickness     Seasonal allergies     Uterine fibroid     Wears glasses        Past Surgical History:   Procedure Laterality Date    BREAST BIOPSY Right 15yrs benign    CYSTOSCOPY N/A 12/9/2020    Procedure: CYSTOSCOPY;  Surgeon: Jackie Coughlin MD;  [facility];  Service: Gynecology    HYSTERECTOMY      IL COLONOSCOPY FLX DX W/COLLJ SPEC WHEN PFRMD N/A 10/9/2018    Procedure: COLONOSCOPY;  Surgeon: Jonna Campos MD;  [facility];  Service: Gastroenterology    IL LAPAROSCOPY W TOTAL HYSTERECTOMY UTERUS 250 GM/< N/A 12/9/2020    Procedure: LAP TOTAL HYSTERECTOMY;  Surgeon: Jackie Coughlin MD;  [facility];  Service: Gynecology    IL LAPAROSCOPY W/RMVL ADNEXAL STRUCTURES Bilateral 12/9/2020    Procedure: SALPINGECTOMY;  Surgeon: Jackie Coughlin MD;  [facility];  Service: Gynecology    IL OPEN TX TRIMALLEOLAR ANKLE FX W/FIXJ PST LIP Right 3/13/2023    Procedure: OPEN REDUCTION W/ INTERNAL FIXATION (ORIF) ANKLE;  Surgeon: Tyrone Parker DO;  [facility];  Service: Orthopedics    TUBAL LIGATION      VAGINAL DELIVERY      x3       Past OB/Gyn History:  OB History          3    Para   3    Term   3            AB        Living   3         SAB        IAB        Ectopic        Multiple        Live Births   3                 Family History   Adopted: Yes   Problem Relation Age of Onset    Hypothyroidism Daughter     Irritable bowel syndrome Daughter     Irritable bowel syndrome Son        Social History:  Social History     Socioeconomic History    Marital status: /Civil Union     Spouse name: Not on file    Number of children: 3    Years of education: Not on file    Highest education level: Associate degree: occupational, technical, or vocational program   Occupational History     Comment: manager for work study program   Tobacco Use    Smoking status: Never    Smokeless tobacco: Never   Vaping Use    Vaping Use: Never used   Substance and Sexual Activity    Alcohol use: Not Currently     Comment: 1 or 2 alcoholic  beverages on holidays    Drug use: Never    Sexual activity: Not Currently     Partners: Male   Other Topics Concern    Not on file   Social History Narrative    Lives in house- wife  piter and daughter dawn     Social Determinants of Health     Financial Resource Strain: Not on file   Food Insecurity: Not on file   Transportation Needs: Not on file   Physical Activity: Not on file   Stress: Not on file   Social Connections: Not on file   Intimate Partner Violence: Not on file   Housing Stability: Not on file     No Known Allergies    Current Outpatient Medications:     bimatoprost (LUMIGAN) 0.01 % ophthalmic drops, Administer 1 drop to both eyes daily at bedtime, Disp: , Rfl:     Calcium Citrate-Vitamin D3 315-6.25 MG-MCG TABS, Take by mouth, Disp: , Rfl:     losartan (Cozaar) 100 MG tablet, Take 1 tablet (100 mg total) by mouth daily, Disp: 90 tablet, Rfl: 3    timolol (TIMOPTIC) 0.5 % ophthalmic solution, , Disp: , Rfl:     Blood Pressure Monitoring (Blood Pressure Cuff) MISC, Use daily (Patient not taking: Reported on 9/13/2023), Disp: 1 each, Rfl: 0    cetirizine (ZyrTEC) 10 mg tablet, Take 10 mg by mouth as needed for allergies (Patient not taking: Reported on 9/13/2023), Disp: , Rfl:       Review of Systems  Constitutional :no fever, feels well, no tiredness, no recent weight gain or loss  ENT: no ear ache, no loss of hearing, no nosebleeds or nasal discharge, no sore throat or hoarseness. Cardiovascular: no complaints of slow or fast heart beat, no chest pain, no palpitations, no leg claudication or lower extremity edema. Respiratory: no complaints of shortness of shortness of breath, no KIDD  Breasts:no complaints of breast pain, breast lump, or nipple discharge  Gastrointestinal: no complaints of abdominal pain, constipation, nausea, vomiting, or diarrhea or bloody stools  Genitourinary : no complaints of dysuria, incontinence, pelvic pain, no dysmenorrhea, vaginal discharge or abnormal vaginal bleeding and as noted in HPI. Musculoskeletal: no complaints of arthralgia, no myalgia, no joint swelling or stiffness, no limb pain or swelling. Integumentary: no complaints of skin rash or lesion, itching or dry skin  Neurological: no complaints of headache, no confusion, no numbness or tingling, no dizziness or fainting    Objective      /72   Ht 5' 4" (1.626 m)   Wt 95.7 kg (211 lb)   LMP 11/02/2020 (Exact Date)   BMI 36.22 kg/m²     General:   appears stated age, cooperative, alert normal mood and affect   Lungs: Unlabored breathing    Breasts: normal appearance, no masses or tenderness   Abdomen: soft, non-tender, without masses or organomegaly   Vulva: normal   Vagina: normal vagina, no discharge, exudate, lesion, or erythema   Urethra: normal   Cervix: Absent. Uterus: uterus absent   Adnexa: no mass, fullness, tenderness   Psychiatric orientation to person, place, and time: normal. mood and affect: normal

## 2024-02-21 PROBLEM — Z12.11 COLON CANCER SCREENING: Status: RESOLVED | Noted: 2018-09-04 | Resolved: 2024-02-21

## 2024-07-16 DIAGNOSIS — I10 PRIMARY HYPERTENSION: ICD-10-CM

## 2024-07-16 RX ORDER — LOSARTAN POTASSIUM 100 MG/1
100 TABLET ORAL DAILY
Qty: 30 TABLET | Refills: 0 | Status: SHIPPED | OUTPATIENT
Start: 2024-07-16

## 2024-07-16 NOTE — TELEPHONE ENCOUNTER
Patient needs an appointment. Please contact the patient to schedule an appointment. Courtesy refill provided.

## 2024-08-19 DIAGNOSIS — Z13.220 SCREENING CHOLESTEROL LEVEL: ICD-10-CM

## 2024-08-19 DIAGNOSIS — I10 PRIMARY HYPERTENSION: ICD-10-CM

## 2024-08-19 DIAGNOSIS — R73.03 PREDIABETES: Primary | ICD-10-CM

## 2024-08-19 RX ORDER — LOSARTAN POTASSIUM 100 MG/1
100 TABLET ORAL DAILY
Qty: 60 TABLET | Refills: 0 | Status: SHIPPED | OUTPATIENT
Start: 2024-08-19

## 2024-10-14 DIAGNOSIS — I10 PRIMARY HYPERTENSION: ICD-10-CM

## 2024-10-15 ENCOUNTER — APPOINTMENT (OUTPATIENT)
Dept: LAB | Facility: HOSPITAL | Age: 59
End: 2024-10-15
Payer: COMMERCIAL

## 2024-10-15 ENCOUNTER — OFFICE VISIT (OUTPATIENT)
Dept: FAMILY MEDICINE CLINIC | Facility: CLINIC | Age: 59
End: 2024-10-15
Payer: COMMERCIAL

## 2024-10-15 VITALS
HEIGHT: 64 IN | BODY MASS INDEX: 37.15 KG/M2 | DIASTOLIC BLOOD PRESSURE: 72 MMHG | OXYGEN SATURATION: 98 % | WEIGHT: 217.6 LBS | TEMPERATURE: 98.1 F | SYSTOLIC BLOOD PRESSURE: 130 MMHG | HEART RATE: 65 BPM

## 2024-10-15 DIAGNOSIS — I10 PRIMARY HYPERTENSION: ICD-10-CM

## 2024-10-15 DIAGNOSIS — E78.5 HYPERLIPIDEMIA, UNSPECIFIED HYPERLIPIDEMIA TYPE: ICD-10-CM

## 2024-10-15 DIAGNOSIS — E66.812 CLASS 2 OBESITY DUE TO EXCESS CALORIES WITHOUT SERIOUS COMORBIDITY WITH BODY MASS INDEX (BMI) OF 37.0 TO 37.9 IN ADULT: ICD-10-CM

## 2024-10-15 DIAGNOSIS — Z23 ENCOUNTER FOR IMMUNIZATION: ICD-10-CM

## 2024-10-15 DIAGNOSIS — E03.9 HYPOTHYROIDISM, UNSPECIFIED TYPE: ICD-10-CM

## 2024-10-15 DIAGNOSIS — Z13.220 SCREENING CHOLESTEROL LEVEL: ICD-10-CM

## 2024-10-15 DIAGNOSIS — E66.09 CLASS 2 OBESITY DUE TO EXCESS CALORIES WITHOUT SERIOUS COMORBIDITY WITH BODY MASS INDEX (BMI) OF 37.0 TO 37.9 IN ADULT: ICD-10-CM

## 2024-10-15 DIAGNOSIS — Z00.00 ANNUAL PHYSICAL EXAM: Primary | ICD-10-CM

## 2024-10-15 DIAGNOSIS — R73.03 PREDIABETES: ICD-10-CM

## 2024-10-15 DIAGNOSIS — Z12.31 ENCOUNTER FOR SCREENING MAMMOGRAM FOR MALIGNANT NEOPLASM OF BREAST: ICD-10-CM

## 2024-10-15 LAB
ALBUMIN SERPL BCG-MCNC: 4.3 G/DL (ref 3.5–5)
ALP SERPL-CCNC: 80 U/L (ref 34–104)
ALT SERPL W P-5'-P-CCNC: 16 U/L (ref 7–52)
ANION GAP SERPL CALCULATED.3IONS-SCNC: 9 MMOL/L (ref 4–13)
AST SERPL W P-5'-P-CCNC: 20 U/L (ref 13–39)
BASOPHILS # BLD AUTO: 0.02 THOUSANDS/ΜL (ref 0–0.1)
BASOPHILS NFR BLD AUTO: 0 % (ref 0–1)
BILIRUB SERPL-MCNC: 0.28 MG/DL (ref 0.2–1)
BUN SERPL-MCNC: 12 MG/DL (ref 5–25)
CALCIUM SERPL-MCNC: 9.4 MG/DL (ref 8.4–10.2)
CHLORIDE SERPL-SCNC: 107 MMOL/L (ref 96–108)
CHOLEST SERPL-MCNC: 205 MG/DL
CO2 SERPL-SCNC: 23 MMOL/L (ref 21–32)
CREAT SERPL-MCNC: 0.83 MG/DL (ref 0.6–1.3)
EOSINOPHIL # BLD AUTO: 0.05 THOUSAND/ΜL (ref 0–0.61)
EOSINOPHIL NFR BLD AUTO: 1 % (ref 0–6)
ERYTHROCYTE [DISTWIDTH] IN BLOOD BY AUTOMATED COUNT: 12.2 % (ref 11.6–15.1)
EST. AVERAGE GLUCOSE BLD GHB EST-MCNC: 126 MG/DL
GFR SERPL CREATININE-BSD FRML MDRD: 78 ML/MIN/1.73SQ M
GLUCOSE P FAST SERPL-MCNC: 120 MG/DL (ref 65–99)
HBA1C MFR BLD: 6 %
HCT VFR BLD AUTO: 42 % (ref 34.8–46.1)
HDLC SERPL-MCNC: 45 MG/DL
HGB BLD-MCNC: 13.2 G/DL (ref 11.5–15.4)
IMM GRANULOCYTES # BLD AUTO: 0.01 THOUSAND/UL (ref 0–0.2)
IMM GRANULOCYTES NFR BLD AUTO: 0 % (ref 0–2)
LDLC SERPL CALC-MCNC: 117 MG/DL (ref 0–100)
LYMPHOCYTES # BLD AUTO: 2.03 THOUSANDS/ΜL (ref 0.6–4.47)
LYMPHOCYTES NFR BLD AUTO: 36 % (ref 14–44)
MCH RBC QN AUTO: 29.9 PG (ref 26.8–34.3)
MCHC RBC AUTO-ENTMCNC: 31.4 G/DL (ref 31.4–37.4)
MCV RBC AUTO: 95 FL (ref 82–98)
MONOCYTES # BLD AUTO: 0.33 THOUSAND/ΜL (ref 0.17–1.22)
MONOCYTES NFR BLD AUTO: 6 % (ref 4–12)
NEUTROPHILS # BLD AUTO: 3.2 THOUSANDS/ΜL (ref 1.85–7.62)
NEUTS SEG NFR BLD AUTO: 57 % (ref 43–75)
NONHDLC SERPL-MCNC: 160 MG/DL
NRBC BLD AUTO-RTO: 0 /100 WBCS
PLATELET # BLD AUTO: 322 THOUSANDS/UL (ref 149–390)
PMV BLD AUTO: 9.4 FL (ref 8.9–12.7)
POTASSIUM SERPL-SCNC: 3.9 MMOL/L (ref 3.5–5.3)
PROT SERPL-MCNC: 7.7 G/DL (ref 6.4–8.4)
RBC # BLD AUTO: 4.41 MILLION/UL (ref 3.81–5.12)
SODIUM SERPL-SCNC: 139 MMOL/L (ref 135–147)
T4 FREE SERPL-MCNC: 0.66 NG/DL (ref 0.61–1.12)
TRIGL SERPL-MCNC: 213 MG/DL
TSH SERPL DL<=0.05 MIU/L-ACNC: 5.13 UIU/ML (ref 0.45–4.5)
WBC # BLD AUTO: 5.64 THOUSAND/UL (ref 4.31–10.16)

## 2024-10-15 PROCEDURE — 83036 HEMOGLOBIN GLYCOSYLATED A1C: CPT

## 2024-10-15 PROCEDURE — 84443 ASSAY THYROID STIM HORMONE: CPT

## 2024-10-15 PROCEDURE — 85025 COMPLETE CBC W/AUTO DIFF WBC: CPT

## 2024-10-15 PROCEDURE — 99214 OFFICE O/P EST MOD 30 MIN: CPT | Performed by: NURSE PRACTITIONER

## 2024-10-15 PROCEDURE — 80053 COMPREHEN METABOLIC PANEL: CPT

## 2024-10-15 PROCEDURE — 36415 COLL VENOUS BLD VENIPUNCTURE: CPT

## 2024-10-15 PROCEDURE — 84439 ASSAY OF FREE THYROXINE: CPT

## 2024-10-15 PROCEDURE — 99396 PREV VISIT EST AGE 40-64: CPT | Performed by: NURSE PRACTITIONER

## 2024-10-15 PROCEDURE — 80061 LIPID PANEL: CPT

## 2024-10-15 NOTE — PATIENT INSTRUCTIONS
"Patient Education     Routine physical for adults   The Basics   Written by the doctors and editors at [facility]   What is a physical? -- A physical is a routine visit, or \"check-up,\" with your doctor. You might also hear it called a \"wellness visit\" or \"preventive visit.\"  During each visit, the doctor will:   Ask about your physical and mental health   Ask about your habits, behaviors, and lifestyle   Do an exam   Give you vaccines if needed   Talk to you about any medicines you take   Give advice about your health   Answer your questions  Getting regular check-ups is an important part of taking care of your health. It can help your doctor find and treat any problems you have. But it's also important for preventing health problems.  A routine physical is different from a \"sick visit.\" A sick visit is when you see a doctor because of a health concern or problem. Since physicals are scheduled ahead of time, you can think about what you want to ask the doctor.  How often should I get a physical? -- It depends on your age and health. In general, for people age 21 years and older:   If you are younger than 50 years, you might be able to get a physical every 3 years.   If you are 50 years or older, your doctor might recommend a physical every year.  If you have an ongoing health condition, like diabetes or high blood pressure, your doctor will probably want to see you more often.  What happens during a physical? -- In general, each visit will include:   Physical exam - The doctor or nurse will check your height, weight, heart rate, and blood pressure. They will also look at your eyes and ears. They will ask about how you are feeling and whether you have any symptoms that bother you.   Medicines - It's a good idea to bring a list of all the medicines you take to each doctor visit. Your doctor will talk to you about your medicines and answer any questions. Tell them if you are having any side effects that bother you. You " "should also tell them if you are having trouble paying for any of your medicines.   Habits and behaviors - This includes:   Your diet   Your exercise habits   Whether you smoke, drink alcohol, or use drugs   Whether you are sexually active   Whether you feel safe at home  Your doctor will talk to you about things you can do to improve your health and lower your risk of health problems. They will also offer help and support. For example, if you want to quit smoking, they can give you advice and might prescribe medicines. If you want to improve your diet or get more physical activity, they can help you with this, too.   Lab tests, if needed - The tests you get will depend on your age and situation. For example, your doctor might want to check your:   Cholesterol   Blood sugar   Iron level   Vaccines - The recommended vaccines will depend on your age, health, and what vaccines you already had. Vaccines are very important because they can prevent certain serious or deadly infections.   Discussion of screening - \"Screening\" means checking for diseases or other health problems before they cause symptoms. Your doctor can recommend screening based on your age, risk, and preferences. This might include tests to check for:   Cancer, such as breast, prostate, cervical, ovarian, colorectal, prostate, lung, or skin cancer   Sexually transmitted infections, such as chlamydia and gonorrhea   Mental health conditions like depression and anxiety  Your doctor will talk to you about the different types of screening tests. They can help you decide which screenings to have. They can also explain what the results might mean.   Answering questions - The physical is a good time to ask the doctor or nurse questions about your health. If needed, they can refer you to other doctors or specialists, too.  Adults older than 65 years often need other care, too. As you get older, your doctor will talk to you about:   How to prevent falling at " home   Hearing or vision tests   Memory testing   How to take your medicines safely   Making sure that you have the help and support you need at home  All topics are updated as new evidence becomes available and our peer review process is complete.  This topic retrieved from [employer] on: May 02, 2024.  Topic 726506 Version 1.0  Release: 32.4.3 - C32.122  © 2024 UpToDate, Inc. and/or its affiliates. All rights reserved.  Consumer Information Use and Disclaimer   Disclaimer: This generalized information is a limited summary of diagnosis, treatment, and/or medication information. It is not meant to be comprehensive and should be used as a tool to help the user understand and/or assess potential diagnostic and treatment options. It does NOT include all information about conditions, treatments, medications, side effects, or risks that may apply to a specific patient. It is not intended to be medical advice or a substitute for the medical advice, diagnosis, or treatment of a health care provider based on the health care provider's examination and assessment of a patient's specific and unique circumstances. Patients must speak with a health care provider for complete information about their health, medical questions, and treatment options, including any risks or benefits regarding use of medications. This information does not endorse any treatments or medications as safe, effective, or approved for treating a specific patient. Up[employer]Date, Inc. and its affiliates disclaim any warranty or liability relating to this information or the use thereof.The use of this information is governed by the Terms of Use, available at https://www.woltersFanIQuwer.com/en/know/clinical-effectiveness-terms. 2024© UpToDate, Inc. and its affiliates and/or licensors. All rights reserved.  Copyright   © 2024 Up[employer]Date, Inc. and/or its affiliates. All rights reserved.    Patient Education     Diet and health   The Basics   Written by the doctors and  "editors at [employer]   Why is it important to eat a healthy diet? -- It's important to eat a healthy diet because eating the right foods can keep you healthy now and later in life. It can lower the risk of problems like heart disease, diabetes, high blood pressure, and some types of cancer. It can also help you live longer and improve your quality of life.  What kind of diet is best? -- There is no 1 specific diet that experts recommend for everyone. People choose what foods to eat for many different reasons. These include personal preference, culture, Moravian, allergies or intolerances, and nutritional goals. People also need to consider the cost and availability of different foods.  In general, experts recommend a diet that:   Includes lots of vegetables, fruits, beans, nuts, and whole grains   Limits red and processed meats, unhealthy fats, sugar, salt, and alcohol  What are dietary patterns? -- A dietary \"pattern\" means generally eating certain types of foods while limiting others. Some people need to follow a specific dietary pattern because of their health needs. For example, if you have high blood pressure, your doctor might recommend a diet low in salt.  If you are trying to improve your health in general, choosing a healthy dietary pattern can help. This does not have to mean being very strict about what you eat or avoid. The goal is to think about getting plenty of healthy foods while limiting less healthy foods.  Examples of dietary patterns include:   Mediterranean diet - This involves eating a lot of fruits, vegetables, nuts, and whole grains, and uses olive oil instead of other fats. It also includes some fish, poultry, and dairy products, but not a lot of red meat. Following this diet can help your overall health, and might even lower your risk of having a stroke.   Plant-based diets - These patterns focus on vegetables, fruits, grains, beans, and nuts. They limit or avoid food that comes from " "animals, such as meat and dairy. There are different types of plant-based diets, including vegetarian and vegan.   Low-fat diet - A low-fat diet involves limiting calories from fat. This might help some people keep weight off if that is their goal, but it does not have many other health benefits. If you choose to follow a low-fat diet, it is also important to focus on getting lots of whole grains, legumes, fruits, and vegetables. Limit refined grains and sugar.   Low-cholesterol diet - Cholesterol is found in foods with a lot of saturated fat, like red meat, butter, and cheese. A low-cholesterol diet focuses on limiting the amount of cholesterol that you eat. Limiting the cholesterol in your diet can also help lower the amount of unhealthy fats that you eat.  Which foods are especially healthy? -- Foods that are especially healthy include:   Fruits and vegetables - Eating a diet with lots of fruits and vegetables can help prevent heart disease and stroke. It might also help prevent certain types of cancer. Try to eat fruits and vegetables at each meal and also for snacks. If you don't have fresh fruits and vegetables available, you can eat frozen or canned ones instead. Doctors recommend eating at least 5 servings of fruits or vegetables each day.   Whole grains - Whole-grain foods include 100 percent whole-wheat bread, steel cut oats, and whole-grain pasta. These are healthier than foods made with \"refined\" grains, like white bread and white rice. Eating lots of whole grains instead of refined grains has been shown to help with weight control. It can also lower the risk of several health problems, including colon cancer, heart disease, and diabetes. Doctors recommend that most people try to eat 5 to 8 servings of whole-grain, high-fiber foods each day.   Foods with fiber - Eating foods with a lot of fiber can help prevent heart disease and stroke. If you have type 2 diabetes, it can also help control your blood " "sugar. Foods that have a lot of fiber include vegetables, fruits, beans, nuts, oatmeal, and whole-grain breads and cereals. You can tell how much fiber is in a food by reading the nutrition label (figure 1). Doctors recommend that most people eat about 25 to 34 grams of fiber each day.   Foods with calcium and vitamin D - Babies, children, and adults need calcium and vitamin D to help keep their bones strong. Adults also need calcium and vitamin D to help prevent osteoporosis. Osteoporosis is a condition that causes bones to get \"thin\" and break more easily than usual. Different foods and drinks have calcium and vitamin D in them (figure 2). People who don't get enough calcium and vitamin D in their diet might need to take a supplement. Doctors recommend that most people have 2 to 3 servings of foods with calcium and vitamin D each day.   Foods with protein - Protein helps your muscles and bones stay strong. Healthy foods with a lot of protein include chicken, fish, eggs, beans, nuts, and soy products. Red meat also has a lot of protein, but it also contains fats, which can be unhealthy. Doctors recommend that most people try to eat about 5 servings of protein each day.   Healthy fats - There are different types of fats. Some types of fats are better for your body than others. Healthy fats are \"monounsaturated\" or \"polyunsaturated\" fats. These are found in fatty fish, nuts and nut butters, and avocados. Use plant-based oils when cooking. Examples of these oils include olive, canola, safflower, sunflower, and corn oil. Eating foods with healthy fats, while avoiding or limiting foods with unhealthy fats, might lower the risk of heart disease.   Foods with folate - Folate is a vitamin that is important for pregnant people, since it helps prevent certain birth defects. It is also called \"folic acid.\" Anyone who could get pregnant should get at least 400 micrograms of folic acid daily, whether or not they are actively " "trying to get pregnant. Folate is found in many breakfast cereals, oranges, orange juice, and green leafy vegetables.  What foods should I avoid or limit? -- To eat a healthy diet, there are some things that you should avoid or limit. They include:   Unhealthy fats - \"Trans\" fats are especially unhealthy. They are found in margarines, many fast foods, and some store-bought baked goods. \"Saturated\" fats are found in animal products like meats, egg yolks, butter, cheese, and full-fat milk products. Unhealthy fats can raise your cholesterol level and increase your chance of getting heart disease.   Sugar - To have a healthy diet, it's important to limit or avoid added sugar, sweets, and refined grains. Refined grains are found in white bread, white rice, most pastas, and most packaged \"snack\" foods.  Avoiding sugar-sweetened beverages, like soda and sports drinks, can also help improve your health.  Avoid canned fruits in \"heavy\" syrup.   Red and processed meats - Studies have shown that eating a lot of red meat can increase your risk of certain health problems, including heart disease and cancer. You should limit the amount of red meat that you eat. This is also true for processed meats like sausage, hot dogs, and doll.  Can I drink alcohol as part of a healthy diet? -- Not drinking alcohol at all is the healthiest choice. Regular drinking can raise a person's chances of getting liver disease and certain types of cancers. In females, even 1 drink a day can increase the risk of getting breast cancer.  If you do choose to drink, most doctors recommend limiting alcohol to no more than:   1 drink a day for females   2 drinks a day for males  The limits are different because, generally, the female body takes longer to break down alcohol.  How many calories do I need each day? -- Calories give your body energy. The number of calories that you need each day depends on your weight, height, age, sex, and how active you " "are.  Your doctor or nurse can tell you about how many calories you should eat each day. You can also work with a dietitian (nutrition expert) to learn more about your dietary needs and options.  What if I am having trouble improving my diet? -- It can be hard to change the way that you eat. Remember that even small changes can improve your health.  Here are some tips that might help:   Try to make fruits and vegetables part of every meal. If you don't have fresh fruits and vegetables, frozen or canned are good options. Look for products without added salt or sugar.   Keep a bowl of fruit out for snacking.   When you can, choose whole grains instead of refined grains. Choose chicken, fish, and beans instead of red meat and cheese.   Try to eat prepared and processed foods less often.   Try flavored seltzer or water instead of soda or juice.   When eating at fast food restaurants, look for healthier items, like broiled chicken or salad.  If you have questions about which foods you should or should not eat, ask your doctor, nurse, or dietitian. The right diet for you will depend, in part, on your health and any medical conditions you have.  All topics are updated as new evidence becomes available and our peer review process is complete.  This topic retrieved from [employer] on: Feb 28, 2024.  Topic 98905 Version 28.0  Release: 32.2.4 - C32.58  © 2024 UpToDate, Inc. and/or its affiliates. All rights reserved.  figure 1: Nutrition label - Fiber     This is an example of a nutrition label. To figure out how much fiber is in a food, look for the line that says \"Dietary Fiber.\" It's also important to look at the serving size. This food has 7 grams of fiber in each serving, and each serving is 1 cup.  Graphic 15033 Version 8.0  figure 2: Foods and drinks with calcium and vitamin D     Foods rich in calcium include ice cream, soy milk, breads, kale, broccoli, milk, cheese, cottage cheese, almonds, yogurt, ready-to-eat cereals, " "beans, and tofu. Foods rich in vitamin D include milk, fortified plant-based \"milks\" (soy, almond), canned tuna fish, cod liver oil, yogurt, ready-to-eat-cereals, cooked salmon, canned sardines, mackerel, and eggs. Some of these foods are rich in both.  Graphic 34140 Version 4.0  Consumer Information Use and Disclaimer   Disclaimer: This generalized information is a limited summary of diagnosis, treatment, and/or medication information. It is not meant to be comprehensive and should be used as a tool to help the user understand and/or assess potential diagnostic and treatment options. It does NOT include all information about conditions, treatments, medications, side effects, or risks that may apply to a specific patient. It is not intended to be medical advice or a substitute for the medical advice, diagnosis, or treatment of a health care provider based on the health care provider's examination and assessment of a patient's specific and unique circumstances. Patients must speak with a health care provider for complete information about their health, medical questions, and treatment options, including any risks or benefits regarding use of medications. This information does not endorse any treatments or medications as safe, effective, or approved for treating a specific patient. UpToDate, Inc. and its affiliates disclaim any warranty or liability relating to this information or the use thereof.The use of this information is governed by the Terms of Use, available at https://www.woltersBuzzmoveuwer.com/en/know/clinical-effectiveness-terms. 2024© UpToDate, Inc. and its affiliates and/or licensors. All rights reserved.  Copyright   © 2024 UpToDate, Inc. and/or its affiliates. All rights reserved.    "

## 2024-10-15 NOTE — PROGRESS NOTES
Adult Annual Physical  Name: Steffi Avery      : 1965      MRN: [id number]  Encounter Provider: KEO Wright  Encounter Date: 10/15/2024   Encounter department: [facility]    Assessment & Plan  Annual physical exam         Encounter for immunization    Orders:    influenza vaccine, recombinant, PF, 0.5 mL IM (Flublok)    Class 2 obesity due to excess calories without serious comorbidity with body mass index (BMI) of 37.0 to 37.9 in adult    States he has been trying to lose weight but having a hard time. Counseled. Does not feel like she needs other resources at this time        Hyperlipidemia, unspecified hyperlipidemia type  Reviewed with patient. ASCVD Risk is low at 4.1%. Counseled on diet and exercise.   Orders:    Lipid panel; Future    Comprehensive metabolic panel; Future    Encounter for screening mammogram for malignant neoplasm of breast    Orders:    Mammo screening bilateral w 3d and cad; Future    Hypothyroidism, unspecified type  TSH is elevated but T4 is normal.  Patient does have difficulty with losing weight and sometimes feels more fatigued than she thinks she should.  Would like to try levothyroxine to see if this helps with her symptoms.  Start 50 mcg daily in the morning on empty stomach and wait half an hour to an hour to eat.  Repeat TSH and T4 in 6 weeks.  Orders:    levothyroxine 50 mcg tablet; Take 1 tablet (50 mcg total) by mouth daily in the early morning    TSH, 3rd generation with Free T4 reflex; Future    Immunizations and preventive care screenings were discussed with patient today. Appropriate education was printed on patient's after visit summary.    Counseling:  Dental Health: discussed importance of regular tooth brushing, flossing, and dental visits.  Exercise: the importance of regular exercise/physical activity was discussed. Recommend exercise 3-5 times per week for at least 30 minutes.          History of Present Illness  "    Adult Annual Physical:  Patient presents for annual physical. B- protein shake   L- left overs from dinner.   D- rice, protein(chicken.salmon) and veggies,   Snacks- donuts at work     This is highest weight. 4 years ago when started with gaining weight. Normal weight was <200 maybe around 190s.     Hot flashes still especially at night. .     Diet and Physical Activity:  - Diet/Nutrition: well balanced diet.  - Exercise: no formal exercise.    Depression Screening:  - PHQ-2 Score: 0    General Health:  - Sleep: 7-8 hours of sleep on average.  - Hearing: normal hearing bilateral ears.  - Vision:. glaucoma every 6 months  - Dental: regular dental visits and brushes teeth twice daily.    /GYN Health:    - Contraception:. LMP- 4 years      Review of Systems   Constitutional:  Negative for chills and fever.   Eyes:  Negative for pain and visual disturbance.   Respiratory:  Negative for cough and shortness of breath.    Cardiovascular:  Negative for chest pain and palpitations.   Gastrointestinal:  Negative for abdominal pain, constipation, diarrhea, nausea and vomiting.   Genitourinary:  Negative for dysuria and hematuria.   Musculoskeletal:  Negative for arthralgias and back pain.   Skin:  Negative for color change and rash.   Neurological:  Negative for dizziness, seizures, syncope and headaches.   All other systems reviewed and are negative.        Objective     /72   Pulse 65   Temp 98.1 °F (36.7 °C)   Ht 5' 4\" (1.626 m)   Wt 98.7 kg (217 lb 9.6 oz)   LMP 11/02/2020 (Exact Date)   SpO2 98%   BMI 37.35 kg/m²     Physical Exam  Constitutional:       General: She is not in acute distress.     Appearance: Normal appearance. She is not ill-appearing or toxic-appearing.   HENT:      Head: Normocephalic and atraumatic.      Right Ear: Tympanic membrane, ear canal and external ear normal. There is no impacted cerumen.      Left Ear: Tympanic membrane, ear canal and external ear normal. There is no " impacted cerumen.      Nose: Nose normal. No congestion or rhinorrhea.      Mouth/Throat:      Mouth: Mucous membranes are moist.      Pharynx: Oropharynx is clear. No oropharyngeal exudate or posterior oropharyngeal erythema.   Eyes:      General: No scleral icterus.        Right eye: No discharge.         Left eye: No discharge.      Conjunctiva/sclera: Conjunctivae normal.      Pupils: Pupils are equal, round, and reactive to light.   Cardiovascular:      Rate and Rhythm: Normal rate and regular rhythm.      Pulses: Normal pulses.      Heart sounds: Normal heart sounds. No murmur heard.     No friction rub. No gallop.   Pulmonary:      Effort: Pulmonary effort is normal. No respiratory distress.      Breath sounds: Normal breath sounds. No stridor. No wheezing, rhonchi or rales.   Abdominal:      General: Abdomen is flat. Bowel sounds are normal. There is no distension.      Palpations: Abdomen is soft. There is no mass.      Tenderness: There is no abdominal tenderness.   Musculoskeletal:      Cervical back: Normal range of motion and neck supple. No rigidity. No muscular tenderness.   Lymphadenopathy:      Cervical: No cervical adenopathy.   Skin:     General: Skin is warm and dry.      Capillary Refill: Capillary refill takes less than 2 seconds.      Coloration: Skin is not jaundiced or pale.      Findings: No bruising or lesion.   Neurological:      General: No focal deficit present.      Mental Status: She is alert and oriented to person, place, and time. Mental status is at baseline.      Sensory: No sensory deficit.      Motor: No weakness.      Gait: Gait normal.   Psychiatric:         Mood and Affect: Mood normal.         Behavior: Behavior normal.         Thought Content: Thought content normal.         Judgment: Judgment normal.

## 2024-10-16 ENCOUNTER — TELEPHONE (OUTPATIENT)
Dept: FAMILY MEDICINE CLINIC | Facility: CLINIC | Age: 59
End: 2024-10-16

## 2024-10-16 RX ORDER — LOSARTAN POTASSIUM 100 MG/1
100 TABLET ORAL DAILY
Qty: 60 TABLET | Refills: 3 | Status: SHIPPED | OUTPATIENT
Start: 2024-10-16

## 2024-10-16 NOTE — TELEPHONE ENCOUNTER
Mimi Martinez MA  10/16/2024 12:43 PM EDT Back to Top      Left message for patient    Margy KEO June  10/16/2024 12:40 PM EDT       Review results with patient yesterday but her t4 was not back . It is normal. We discussed her TSH being high and possible side effects, based on her t4 we do not need to start levothyroxine we can monitor this over time. However, if she wants to try it to see if it helps with weight loss or fatigue, we can.

## 2024-10-16 NOTE — TELEPHONE ENCOUNTER
Pt called back and was given test results and recommendations. Pt would like to still try the Levothyroxine. Asking for the prescription to be sent to [facility], [place]

## 2024-10-17 RX ORDER — LEVOTHYROXINE SODIUM 50 UG/1
50 TABLET ORAL
Qty: 100 TABLET | Refills: 3 | Status: SHIPPED | OUTPATIENT
Start: 2024-10-17

## 2024-11-27 ENCOUNTER — APPOINTMENT (OUTPATIENT)
Dept: LAB | Facility: HOSPITAL | Age: 59
End: 2024-11-27
Payer: COMMERCIAL

## 2024-11-27 DIAGNOSIS — E78.5 HYPERLIPIDEMIA, UNSPECIFIED HYPERLIPIDEMIA TYPE: ICD-10-CM

## 2024-11-27 DIAGNOSIS — E03.9 HYPOTHYROIDISM, UNSPECIFIED TYPE: ICD-10-CM

## 2024-11-27 LAB
ALBUMIN SERPL BCG-MCNC: 4.2 G/DL (ref 3.5–5)
ALP SERPL-CCNC: 66 U/L (ref 34–104)
ALT SERPL W P-5'-P-CCNC: 15 U/L (ref 7–52)
ANION GAP SERPL CALCULATED.3IONS-SCNC: 6 MMOL/L (ref 4–13)
AST SERPL W P-5'-P-CCNC: 19 U/L (ref 13–39)
BILIRUB SERPL-MCNC: 0.44 MG/DL (ref 0.2–1)
BUN SERPL-MCNC: 15 MG/DL (ref 5–25)
CALCIUM SERPL-MCNC: 9.1 MG/DL (ref 8.4–10.2)
CHLORIDE SERPL-SCNC: 108 MMOL/L (ref 96–108)
CHOLEST SERPL-MCNC: 207 MG/DL (ref ?–200)
CO2 SERPL-SCNC: 23 MMOL/L (ref 21–32)
CREAT SERPL-MCNC: 0.84 MG/DL (ref 0.6–1.3)
GFR SERPL CREATININE-BSD FRML MDRD: 76 ML/MIN/1.73SQ M
GLUCOSE P FAST SERPL-MCNC: 105 MG/DL (ref 65–99)
HDLC SERPL-MCNC: 47 MG/DL
LDLC SERPL CALC-MCNC: 134 MG/DL (ref 0–100)
NONHDLC SERPL-MCNC: 160 MG/DL
POTASSIUM SERPL-SCNC: 4.1 MMOL/L (ref 3.5–5.3)
PROT SERPL-MCNC: 7.1 G/DL (ref 6.4–8.4)
SODIUM SERPL-SCNC: 137 MMOL/L (ref 135–147)
TRIGL SERPL-MCNC: 130 MG/DL (ref ?–150)
TSH SERPL DL<=0.05 MIU/L-ACNC: 2.47 UIU/ML (ref 0.45–4.5)

## 2024-11-27 PROCEDURE — 80053 COMPREHEN METABOLIC PANEL: CPT

## 2024-11-27 PROCEDURE — 36415 COLL VENOUS BLD VENIPUNCTURE: CPT

## 2024-11-27 PROCEDURE — 84443 ASSAY THYROID STIM HORMONE: CPT

## 2024-11-27 PROCEDURE — 80061 LIPID PANEL: CPT

## 2024-11-29 ENCOUNTER — RESULTS FOLLOW-UP (OUTPATIENT)
Dept: FAMILY MEDICINE CLINIC | Facility: CLINIC | Age: 59
End: 2024-11-29

## 2024-12-03 ENCOUNTER — HOSPITAL ENCOUNTER (OUTPATIENT)
Dept: MAMMOGRAPHY | Facility: HOSPITAL | Age: 59
Discharge: HOME | End: 2024-12-03
Payer: COMMERCIAL

## 2024-12-03 DIAGNOSIS — Z12.31 ENCOUNTER FOR SCREENING MAMMOGRAM FOR MALIGNANT NEOPLASM OF BREAST: ICD-10-CM

## 2024-12-03 PROCEDURE — 77067 SCR MAMMO BI INCL CAD: CPT

## 2024-12-03 PROCEDURE — 77063 BREAST TOMOSYNTHESIS BI: CPT

## 2025-03-18 ENCOUNTER — ANNUAL EXAM (OUTPATIENT)
Dept: OBGYN CLINIC | Facility: MEDICAL CENTER | Age: 60
End: 2025-03-18
Payer: COMMERCIAL

## 2025-03-18 VITALS
SYSTOLIC BLOOD PRESSURE: 126 MMHG | WEIGHT: 216 LBS | BODY MASS INDEX: 36.88 KG/M2 | HEIGHT: 64 IN | DIASTOLIC BLOOD PRESSURE: 80 MMHG

## 2025-03-18 DIAGNOSIS — N95.1 MENOPAUSAL SYMPTOMS: ICD-10-CM

## 2025-03-18 DIAGNOSIS — Z90.710 S/P LAPAROSCOPIC HYSTERECTOMY: ICD-10-CM

## 2025-03-18 DIAGNOSIS — Z12.31 ENCOUNTER FOR SCREENING MAMMOGRAM FOR MALIGNANT NEOPLASM OF BREAST: ICD-10-CM

## 2025-03-18 DIAGNOSIS — Z01.419 ENCOUNTER FOR GYNECOLOGICAL EXAMINATION: Primary | ICD-10-CM

## 2025-03-18 PROCEDURE — S0612 ANNUAL GYNECOLOGICAL EXAMINA: HCPCS | Performed by: OBSTETRICS & GYNECOLOGY

## 2025-03-18 RX ORDER — ESTRADIOL 0.03 MG/D
1 FILM, EXTENDED RELEASE TRANSDERMAL 2 TIMES WEEKLY
Qty: 24 PATCH | Refills: 1 | Status: SHIPPED | OUTPATIENT
Start: 2025-03-20

## 2025-03-18 RX ORDER — PROGESTERONE 100 MG/1
1 CAPSULE ORAL
Qty: 90 CAPSULE | Refills: 1 | Status: SHIPPED | OUTPATIENT
Start: 2025-03-18

## 2025-03-18 NOTE — PROGRESS NOTES
"Name: Steffi Avery      : 1965      MRN: [id number]  Encounter Provider: Fina Henderson MD  Encounter Date: 3/18/2025   Encounter department: [facility]  :  Assessment & Plan  Encounter for screening mammogram for malignant neoplasm of breast    Orders:    Mammo screening bilateral w 3d and cad; Future    S/P laparoscopic hysterectomy         Menopausal symptoms    Orders:    estradiol ([place]) 0.025 MG/24HR; Place 1 patch on the skin 2 (two) times a week    Progesterone 100 MG CAPS; Take 1 tablet by mouth daily at bedtime  we discussed risks and benefits of  HRT   All questions answered   Interested  in giving  HRT a try    Follow  up in 3 months     Encounter for gynecological examination       pap up to date , no further pap needed given  hysterectomy   Mammo  order  placed   Colonoscopy up to date         History of Present Illness   HPI  Steffi Avery is a 59 y.o. female who presents for annual GYN exam . States has been having menopausal symptoms  - hot  flushes  / problems sleeping  , some mood changes and  weight    History obtained from: patient    Review of Systems   Constitutional:  Negative for activity change, appetite change, chills, diaphoresis, fatigue, fever and unexpected weight change.   HENT: Negative.     Respiratory: Negative.     Cardiovascular: Negative.    Gastrointestinal: Negative.    Genitourinary: Negative.    Musculoskeletal: Negative.    Skin: Negative.           Objective   /80   Ht 5' 4\" (1.626 m)   Wt 98 kg (216 lb)   LMP 2020 (Exact Date)   BMI 37.08 kg/m²      Physical Exam  Constitutional:       Appearance: Normal appearance. She is normal weight.   HENT:      Head: Normocephalic and atraumatic.      Nose: Nose normal.   Eyes:      Conjunctiva/sclera: Conjunctivae normal.   Pulmonary:      Effort: Pulmonary effort is normal.   Chest:   Breasts:     Right: Normal. No swelling, bleeding, inverted " nipple, mass, nipple discharge, skin change or tenderness.      Left: Normal. No swelling, bleeding, inverted nipple, mass, nipple discharge, skin change or tenderness.   Abdominal:      General: There is no distension.      Palpations: There is no mass.      Tenderness: There is no abdominal tenderness. There is no guarding or rebound.      Hernia: No hernia is present.   Genitourinary:     General: Normal vulva.      Vagina: Normal.      Uterus: Absent.       Adnexa: Right adnexa normal and left adnexa normal.   Neurological:      Mental Status: She is alert.   Psychiatric:         Mood and Affect: Mood normal.         Behavior: Behavior normal.

## 2025-04-21 ENCOUNTER — OFFICE VISIT (OUTPATIENT)
Dept: FAMILY MEDICINE CLINIC | Facility: CLINIC | Age: 60
End: 2025-04-21
Payer: COMMERCIAL

## 2025-04-21 VITALS
DIASTOLIC BLOOD PRESSURE: 70 MMHG | WEIGHT: 215 LBS | HEART RATE: 77 BPM | HEIGHT: 64 IN | SYSTOLIC BLOOD PRESSURE: 128 MMHG | BODY MASS INDEX: 36.7 KG/M2 | TEMPERATURE: 97 F | OXYGEN SATURATION: 98 %

## 2025-04-21 DIAGNOSIS — R73.03 PREDIABETES: ICD-10-CM

## 2025-04-21 DIAGNOSIS — E78.5 HYPERLIPIDEMIA, UNSPECIFIED HYPERLIPIDEMIA TYPE: ICD-10-CM

## 2025-04-21 DIAGNOSIS — I10 PRIMARY HYPERTENSION: Primary | ICD-10-CM

## 2025-04-21 DIAGNOSIS — E03.9 HYPOTHYROIDISM, UNSPECIFIED TYPE: ICD-10-CM

## 2025-04-21 PROCEDURE — 99214 OFFICE O/P EST MOD 30 MIN: CPT | Performed by: NURSE PRACTITIONER

## 2025-04-21 RX ORDER — VIT C/B6/B5/MAGNESIUM/HERB 173 50-5-6-5MG
CAPSULE ORAL
COMMUNITY

## 2025-04-21 RX ORDER — KETOROLAC TROMETHAMINE 5 MG/ML
SOLUTION OPHTHALMIC
COMMUNITY
Start: 2025-04-16

## 2025-04-21 NOTE — ASSESSMENT & PLAN NOTE
Very well controlled at this time. She is on Losartan 100 mg daily. She is focusing on trying to lose weight.  She is doing things like a protein shake for breakfast, Greek yogurt which she is seeds and fruit for lunch or low-fat cottage cheese and then a balanced dinner with things like salmon broccoli and rice.  Orders:  •  CBC and differential; Future  •  Comprehensive metabolic panel; Future

## 2025-04-21 NOTE — PROGRESS NOTES
Name: Steffi Avery      : 1965      MRN: [id number]  Encounter Provider: KEO Wright  Encounter Date: 2025   Encounter department: [facility]  :  Assessment & Plan  Primary hypertension  Very well controlled at this time. She is on Losartan 100 mg daily. She is focusing on trying to lose weight.  She is doing things like a protein shake for breakfast, Greek yogurt which she is seeds and fruit for lunch or low-fat cottage cheese and then a balanced dinner with things like salmon broccoli and rice.  Orders:  •  CBC and differential; Future  •  Comprehensive metabolic panel; Future    Prediabetes    Orders:  •  Hemoglobin A1C; Future    Hypothyroidism, unspecified type  She did start taking HRT and is feeling a significant improvement in her fatigue and motivation throughout the day.  She does not want to stop the levothyroxine at this point though she is finally feeling good over the last few weeks and wants to continue on her current regimen of medication as she is taking it.  She is sleeping well at night and this has been helping her feel better throughout the day.  - Recommend repeating TSH every 6 months.  Orders:  •  TSH, 3rd generation with Free T4 reflex; Standing    Hyperlipidemia, unspecified hyperlipidemia type  Recommend low-fat diet with routine exercise  Orders:  •  Lipid panel; Future           History of Present Illness   HPI  Review of Systems   Constitutional:  Negative for chills, fatigue and fever.   HENT:  Negative for ear pain and sore throat.    Eyes:  Negative for pain and visual disturbance.   Respiratory:  Negative for cough and shortness of breath.    Cardiovascular:  Negative for chest pain and palpitations.   Gastrointestinal:  Negative for abdominal pain and vomiting.   Genitourinary:  Negative for dysuria and hematuria.   Musculoskeletal:  Negative for arthralgias and back pain.   Skin:  Negative for color change and rash.  "  Neurological:  Negative for seizures and syncope.   All other systems reviewed and are negative.      Objective   /70   Pulse 77   Temp (!) 97 °F (36.1 °C)   Ht 5' 4\" (1.626 m)   Wt 97.5 kg (215 lb)   LMP 11/02/2020 (Exact Date)   SpO2 98%   BMI 36.90 kg/m²      Physical Exam  Vitals and nursing note reviewed.   Constitutional:       General: She is not in acute distress.     Appearance: She is well-developed.   HENT:      Head: Normocephalic and atraumatic.   Cardiovascular:      Rate and Rhythm: Normal rate and regular rhythm.      Pulses: Normal pulses.      Heart sounds: Normal heart sounds. No murmur heard.  Pulmonary:      Effort: Pulmonary effort is normal. No respiratory distress.      Breath sounds: Normal breath sounds.   Skin:     General: Skin is warm and dry.      Capillary Refill: Capillary refill takes less than 2 seconds.   Neurological:      Mental Status: She is alert.   Psychiatric:         Mood and Affect: Mood normal.         "

## 2025-06-06 DIAGNOSIS — I10 PRIMARY HYPERTENSION: ICD-10-CM

## 2025-06-06 RX ORDER — LOSARTAN POTASSIUM 100 MG/1
100 TABLET ORAL DAILY
Qty: 60 TABLET | Refills: 5 | Status: SHIPPED | OUTPATIENT
Start: 2025-06-06

## 2025-07-21 ENCOUNTER — APPOINTMENT (OUTPATIENT)
Dept: URGENT CARE | Facility: CLINIC | Age: 60
End: 2025-07-21

## (undated) DEVICE — GLOVE INDICATOR PI UNDERGLOVE SZ 7 BLUE

## (undated) DEVICE — CHLORAPREP HI-LITE 26ML ORANGE

## (undated) DEVICE — CAST PADDING 4 IN SYNTHETIC NON-STRL

## (undated) DEVICE — UTERINE MANIPULATOR RUMI DISP TIP 6.7X12CM ORANGE

## (undated) DEVICE — 2.0MM DRILL BIT WITH DEPTH MARK/QC/140MM

## (undated) DEVICE — GLOVE SRG BIOGEL ECLIPSE 7.5

## (undated) DEVICE — 2.7MM METAPHYSEAL SCR SLF-TPNG W/T8 STRDRV RECESS/20MM: Type: IMPLANTABLE DEVICE | Site: ANKLE | Status: NON-FUNCTIONAL

## (undated) DEVICE — BLUNT TIP LAPAROSCOPIC SEALER/DIVIDER NANO-COATED: Brand: LIGASURE

## (undated) DEVICE — 3M™ STERI-DRAPE™ UNDER BUTTOCKS DRAPE WITH POUCH 1084: Brand: STERI-DRAPE™

## (undated) DEVICE — STRETCH BANDAGE: Brand: CURITY

## (undated) DEVICE — 2.7MM THREE-FLUTED DRILL BIT QC/125MM

## (undated) DEVICE — OCCLUDER COLPO-PNEUMO

## (undated) DEVICE — ENDOPATH XCEL BLADELESS TROCARS WITH STABILITY SLEEVES: Brand: ENDOPATH XCEL

## (undated) DEVICE — COBAN 6 IN STERILE

## (undated) DEVICE — GLOVE INDICATOR PI UNDERGLOVE SZ 6.5 BLUE

## (undated) DEVICE — LUBRICANT SURGILUBE TUBE 4 OZ  FLIP TOP

## (undated) DEVICE — ACE WRAP 4 IN STERILE

## (undated) DEVICE — GLOVE SRG BIOGEL ECLIPSE 8

## (undated) DEVICE — ALL PURPOSE SPONGES,NON-WOVEN, 4 PLY: Brand: CURITY

## (undated) DEVICE — INTENDED FOR TISSUE SEPARATION, AND OTHER PROCEDURES THAT REQUIRE A SHARP SURGICAL BLADE TO PUNCTURE OR CUT.: Brand: BARD-PARKER ® CARBON RIB-BACK BLADES

## (undated) DEVICE — IV EXTENSION TUBING 33 IN

## (undated) DEVICE — TROCAR: Brand: KII FIOS FIRST ENTRY

## (undated) DEVICE — 10FR FRAZIER SUCTION HANDLE: Brand: CARDINAL HEALTH

## (undated) DEVICE — GLOVE SRG LF STRL BGL SKNSNS 5.5 PF

## (undated) DEVICE — DRAPE C-ARM X-RAY

## (undated) DEVICE — ENDOPATH 5MM CURVED SCISSORS WITH MONOPOLAR CAUTERY: Brand: ENDOPATH

## (undated) DEVICE — SUT STRATAFIX SPIRAL 2-0 CT-1 30 CM SXPP1B410

## (undated) DEVICE — CONMED SCOPE SAVER BITE BLOCK, 20X27 MM: Brand: SCOPE SAVER

## (undated) DEVICE — DRAPE SHEET THREE QUARTER

## (undated) DEVICE — BETHLEHEM UNIVERSAL GYN LAP PK: Brand: CARDINAL HEALTH

## (undated) DEVICE — GLOVE INDICATOR PI UNDERGLOVE SZ 8 BLUE

## (undated) DEVICE — IRRIG ENDO FLO TUBING

## (undated) DEVICE — SUT MONOCRYL 4-0 PS-2 27 IN Y426H

## (undated) DEVICE — 3000CC GUARDIAN II: Brand: GUARDIAN

## (undated) DEVICE — PVC URETHRAL CATHETER: Brand: DOVER

## (undated) DEVICE — DRESSING XEROFORM 5 X 9

## (undated) DEVICE — ENDOPATH 5MM ENDOSCOPIC BLUNT TIP DISSECTORS (12 POUCHES CONTAINING 3 DISSECTORS EACH): Brand: ENDOPATH

## (undated) DEVICE — GAUZE SPONGES,16 PLY: Brand: CURITY

## (undated) DEVICE — BETHLEHEM UNIVERSAL  MIONR EXT: Brand: CARDINAL HEALTH

## (undated) DEVICE — TUBING SMOKE EVAC W/FILTRATION DEVICE PLUMEPORT ACTIV

## (undated) DEVICE — TRAY FOLEY 16FR URIMETER SILICONE SURESTEP

## (undated) DEVICE — PADDING CAST 6IN COTTON STRL

## (undated) DEVICE — GLOVE PI ULTRA TOUCH SZ.6.5

## (undated) DEVICE — FRAZIER SUCTION INSTRUMENT 18 FR W/OBTURATOR, NO CONTROL VENT: Brand: FRAZIER

## (undated) DEVICE — SUT VICRYL 0 CT-1 36 IN J946H

## (undated) DEVICE — HEMOSTATIC MATRIX SURGIFLO 8ML W/THROMBIN

## (undated) DEVICE — TUBING SUCTION 5MM X 12 FT

## (undated) DEVICE — 2000CC GUARDIAN II: Brand: GUARDIAN

## (undated) DEVICE — TOWEL SET X-RAY

## (undated) DEVICE — ACE WRAP 6 IN STERILE

## (undated) DEVICE — PLUMEPEN PRO 10FT

## (undated) DEVICE — INTENDED FOR TISSUE SEPARATION, AND OTHER PROCEDURES THAT REQUIRE A SHARP SURGICAL BLADE TO PUNCTURE OR CUT.: Brand: BARD-PARKER SAFETY BLADES SIZE 11, STERILE

## (undated) DEVICE — GLOVE PI ULTRA TOUCH SZ.7.0

## (undated) DEVICE — DRAPE EQUIPMENT RF WAND

## (undated) DEVICE — PREMIUM DRY TRAY LF: Brand: MEDLINE INDUSTRIES, INC.

## (undated) DEVICE — 3M™ STERI-DRAPE™ U-DRAPE 1015: Brand: STERI-DRAPE™

## (undated) DEVICE — MAYO STAND COVER: Brand: CONVERTORS

## (undated) DEVICE — ADHESIVE SKIN HIGH VISCOSITY EXOFIN 1ML

## (undated) DEVICE — GLOVE SRG BIOGEL 8.5

## (undated) DEVICE — DRAPE C-ARMOUR

## (undated) DEVICE — GLOVE INDICATOR UNDERGLOVE SZ 6 BLUE

## (undated) DEVICE — ASTOUND STANDARD SURGICAL GOWN, XL: Brand: CONVERTORS

## (undated) DEVICE — GLOVE SRG BIOGEL 7.5

## (undated) DEVICE — 1.25MM KIRSCHNER WIRE W/TROCAR POINT 150MM
Type: IMPLANTABLE DEVICE | Site: ANKLE | Status: NON-FUNCTIONAL
Removed: 2023-03-13

## (undated) DEVICE — GLOVE INDICATOR PI UNDERGLOVE SZ 7.5 BLUE

## (undated) DEVICE — SPONGE LAP 18 X 18 IN STRL RFD

## (undated) DEVICE — BLUE HEAT SCOPE WARMER

## (undated) DEVICE — SCD SEQUENTIAL COMPRESSION COMFORT SLEEVE MEDIUM KNEE LENGTH: Brand: KENDALL SCD

## (undated) DEVICE — [HIGH FLOW INSUFFLATOR,  DO NOT USE IF PACKAGE IS DAMAGED,  KEEP DRY,  KEEP AWAY FROM SUNLIGHT,  PROTECT FROM HEAT AND RADIOACTIVE SOURCES.]: Brand: PNEUMOSURE

## (undated) DEVICE — SYRINGE 50ML LL

## (undated) DEVICE — ENDOPATH XCEL UNIVERSAL TROCAR STABLILITY SLEEVES: Brand: ENDOPATH XCEL

## (undated) DEVICE — SURGIFLO ENDOSCOPIC APPICATOR: Brand: ETHICON

## (undated) DEVICE — THE EXACTO COLD SNARE IS INTENDED TO BE USED WITHOUT DIATHERMIC ENERGY FOR THE ENDOSCOPIC RESECTION OF POLYP TISSUE IN THE GASTROINTESTINAL TRACT.: Brand: EXACTO

## (undated) DEVICE — PADDING CAST 4 IN  COTTON STRL